# Patient Record
Sex: FEMALE | Race: WHITE | Employment: OTHER | URBAN - METROPOLITAN AREA
[De-identification: names, ages, dates, MRNs, and addresses within clinical notes are randomized per-mention and may not be internally consistent; named-entity substitution may affect disease eponyms.]

---

## 2017-01-30 ENCOUNTER — OFFICE VISIT (OUTPATIENT)
Dept: INTERNAL MEDICINE CLINIC | Age: 82
End: 2017-01-30

## 2017-01-30 VITALS
WEIGHT: 95 LBS | TEMPERATURE: 97 F | HEART RATE: 68 BPM | HEIGHT: 56 IN | RESPIRATION RATE: 16 BRPM | OXYGEN SATURATION: 99 % | BODY MASS INDEX: 21.37 KG/M2 | SYSTOLIC BLOOD PRESSURE: 148 MMHG | DIASTOLIC BLOOD PRESSURE: 73 MMHG

## 2017-01-30 DIAGNOSIS — F41.9 ANXIETY: Primary | ICD-10-CM

## 2017-01-30 DIAGNOSIS — K58.8 OTHER IRRITABLE BOWEL SYNDROME: ICD-10-CM

## 2017-01-30 DIAGNOSIS — Z23 ENCOUNTER FOR IMMUNIZATION: ICD-10-CM

## 2017-01-30 DIAGNOSIS — M15.9 PRIMARY OSTEOARTHRITIS INVOLVING MULTIPLE JOINTS: ICD-10-CM

## 2017-01-30 RX ORDER — CALC/MAG/B COMPLEX/D3/HERB 61
TABLET ORAL
Qty: 180 CAP | Refills: 5 | Status: SHIPPED | OUTPATIENT
Start: 2017-01-30 | End: 2017-05-30 | Stop reason: SDUPTHER

## 2017-01-30 RX ORDER — LORAZEPAM 0.5 MG/1
0.5 TABLET ORAL
Qty: 90 TAB | Refills: 2 | Status: SHIPPED | OUTPATIENT
Start: 2017-01-30 | End: 2017-05-09 | Stop reason: SDUPTHER

## 2017-01-30 NOTE — MR AVS SNAPSHOT
Visit Information Date & Time Provider Department Dept. Phone Encounter #  
 1/30/2017  2:00 PM Ra Prema, [facility] [phone] [id number] Follow-up Instructions Return in about 3 months (around 4/30/2017) for anxiety. Upcoming Health Maintenance Date Due ZOSTER VACCINE AGE 60> 7/7/1989 Pneumococcal 65+ Low/Medium Risk (2 of 2 - PPSV23) 10/28/2016 MEDICARE YEARLY EXAM 4/1/2017* GLAUCOMA SCREENING Q2Y 10/21/2017 DTaP/Tdap/Td series (2 - Td) 8/10/2026 *Topic was postponed. The date shown is not the original due date. Allergies as of 1/30/2017  Review Complete On: 1/30/2017 By: Jens Townsend LPN Severity Noted Reaction Type Reactions Flexeril [Cyclobenzaprine]  09/26/2011   Not Verified Unknown (comments)  
 unknown Pcn [Penicillins]  09/26/2011   Topical Rash Current Immunizations  Reviewed on 10/23/2012 Name Date Influenza High Dose Vaccine PF 10/27/2016, 9/30/2015 Influenza Vaccine Split 10/23/2012, 10/25/2011 Pneumococcal Conjugate (PCV-13) 10/28/2015 Tdap 8/10/2016 Not reviewed this visit You Were Diagnosed With   
  
 Codes Comments Anxiety    -  Primary ICD-10-CM: F41.9 ICD-9-CM: 300.00 Other irritable bowel syndrome     ICD-10-CM: K58.8 ICD-9-CM: 002.8 Primary osteoarthritis involving multiple joints     ICD-10-CM: M15.0 ICD-9-CM: 715.09 Encounter for immunization     ICD-10-CM: O29 ICD-9-CM: V03.89 Vitals BP Pulse Temp Resp Height(growth percentile) Weight(growth percentile) 148/73 (BP 1 Location: Left arm, BP Patient Position: Sitting) 68 97 °F (36.1 °C) (Oral) 16 4' 8\" (1.422 m) 95 lb (43.1 kg) SpO2 BMI OB Status Smoking Status 99% 21.3 kg/m2 Postmenopausal Never Smoker Vitals History BMI and BSA Data Body Mass Index Body Surface Area  
 21.3 kg/m 2 1.3 m 2 Preferred Pharmacy Pharmacy Name Phone [place] [address] [place] [facility] [phone] Your Updated Medication List  
  
   
This list is accurate as of: 17  2:30 PM.  Always use your most recent med list.  
  
  
  
  
 brimonidine 0.15 % ophthalmic solution Commonly known as:  Emmy Beam Administer 1 Drop to both eyes two (2) times a day. escitalopram oxalate 10 mg tablet Commonly known as:  Bon Chavez Take 1 Tab by mouth daily. fexofenadine 180 mg tablet Commonly known as:  Charis Frankel Take 1 Tab by mouth daily. fluticasone 50 mcg/actuation nasal spray Commonly known as:  Gerson Roberts 1spray each nostril qam  
  
 hydrocortisone 25 mg Supp Commonly known as:  ANUCORT-HC Insert 1 Suppository into rectum two (2) times a day. lansoprazole 15 mg capsule Commonly known as:  PREVACID  
1 bid LORazepam 0.5 mg tablet Commonly known as:  ATIVAN Take 1 Tab by mouth every eight (8) hours as needed. Max Daily Amount: 1.5 mg. As needed for anxiety TRAVATAN Z 0.004 % ophthalmic solution Generic drug:  travoprost  
Administer 1 Drop to both eyes nightly. Prescriptions Printed Refills LORazepam (ATIVAN) 0.5 mg tablet 2 Sig: Take 1 Tab by mouth every eight (8) hours as needed. Max Daily Amount: 1.5 mg. As needed for anxiety Class: Print Route: Oral  
  
Prescriptions Sent to Pharmacy Refills  
 lansoprazole (PREVACID) 15 mg capsule 5 Si bid Class: Normal  
 Pharmacy: [address] [facility] [place] [facility] Ph #: [phone] Follow-up Instructions Return in about 3 months (around 2017) for anxiety. Patient Instructions Learning About Anxiety Disorders What are anxiety disorders? Anxiety disorders are a type of medical problem. They cause severe anxiety. When you feel anxious, you feel that something bad is about to happen. This feeling interferes with your life. These disorders include: · Generalized anxiety disorder. You feel worried and stressed about many everyday events and activities. This goes on for several months and disrupts your life on most days. · Panic disorder. You have repeated panic attacks. A panic attack is a sudden, intense fear or anxiety. It may make you feel short of breath. Your heart may pound. · Social anxiety disorder. You feel very anxious about what you will say or do in front of people. For example, you may be scared to talk or eat in public. This problem affects your daily life. · Phobias. You are very scared of a specific object, situation, or activity. For example, you may fear spiders, high places, or small spaces. What are the symptoms? Generalized anxiety disorder Symptoms may include: · Feeling worried and stressed about many things almost every day. · Feeling tired or irritable. You may have a hard time concentrating. · Having headaches or muscle aches. · Having a hard time swallowing. · Feeling shaky, sweating, or having hot flashes. Panic disorder You may have repeated panic attacks when there is no reason for feeling afraid. You may change your daily activities because you worry that you will have another attack. Symptoms may include: 
· Intense fear, terror, or anxiety. · Trouble breathing or very fast breathing. · Chest pain or tightness. · A heartbeat that races or is not regular. Social anxiety disorder Symptoms may include: · Fear about a social situation, such as eating in front of others or speaking in public. You may worry a lot. Or you may be afraid that something bad will happen. · Anxiety that can cause you to blush, sweat, and feel shaky. · A heartbeat that is faster than normal. 
· A hard time focusing. Phobias Symptoms may include: · More fear than most people of being around an object, being in a situation, or doing an activity. You might also be stressed about the chance of being around the thing you fear. · Worry about losing control, panicking, fainting, or having physical symptoms like a faster heartbeat when you are around the situation or object. How are these disorders treated? Anxiety disorders can be treated with medicines or counseling. A combination of both may be used. Medicines may include: · Antidepressants. These may help your symptoms by keeping chemicals in your brain in balance. · Benzodiazepines. These may give you short-term relief of your symptoms. Some people use cognitive-behavioral therapy. A therapist helps you learn to change stressful or bad thoughts into helpful thoughts. Lead a healthy lifestyle A healthy lifestyle may help you feel better. · Get at least 30 minutes of exercise on most days of the week. Walking is a good choice. · Eat a healthy diet. Include fruits, vegetables, lean proteins, and whole grains in your diet each day. · Try to go to bed at the same time every night. Try for 8 hours of sleep a night. · Find ways to manage stress. Try relaxation exercises. · Avoid alcohol and illegal drugs. Follow-up care is a key part of your treatment and safety. Be sure to make and go to all appointments, and call your doctor if you are having problems. It's also a good idea to know your test results and keep a list of the medicines you take. Where can you learn more? Go to http://tootie-mery.info/. Enter N146 in the search box to learn more about \"Learning About Anxiety Disorders. \" Current as of: July 26, 2016 Content Version: 11.1 © 9895-6517 [employer]. Care instructions adapted under license by [employer] (which disclaims liability or warranty for this information). If you have questions about a medical condition or this instruction, always ask your healthcare professional. [place] [address] any warranty or liability for your use of this information. Lorazepam (By mouth) Lorazepam ([id number]) Treats anxiety. Brand Name(s):Ativan, LORazepam Intensol There may be other brand names for this medicine. When This Medicine Should Not Be Used: This medicine is not right for everyone. Do not use it if you had an allergic reaction to lorazepam or similar medicines, or if you have acute narrow-angle glaucoma. How to Use This Medicine:  
Tablet, Liquid · Take your medicine as directed. Your dose may need to be changed several times to find what works best for you. · Measure the oral liquid medicine with a marked measuring spoon, oral syringe, or medicine cup. · Mix the oral solution with water, juice, soda, applesauce, or pudding. Drink or eat the mixture right away. Do not store it for later use. · Missed dose: Take a dose as soon as you remember. If you are more than 1 hour late, skip the missed dose and wait until it is time for your next dose. Do not use extra medicine to make up for a missed dose. · Tablets: Store the medicine in a closed container at room temperature, away from heat, moisture, and direct light. · Oral solution: Refrigerate the oral solution. Throw away an opened bottle after 90 days. Drugs and Foods to Avoid: Ask your doctor or pharmacist before using any other medicine, including over-the-counter medicines, vitamins, and herbal products. · Some medicines can affect how lorazepam works. Tell your doctor if you are also using any of the following: ¨ Theophylline, aminophylline ¨ Clozapine ¨ Probenecid ¨ Valproate ¨ Medicine to treat seizures ¨ Medicine to treat depression or mental illness · Tell your doctor if you use anything else that makes you sleepy. Some examples are allergy medicine, narcotic pain medicine, and alcohol. Warnings While Using This Medicine: · Tell your doctor if you are pregnant or breastfeeding, or if you have glaucoma, liver disease, lung problems, or a history of depression, alcohol or drug addiction, or seizures. · This medicine can be habit-forming. Do not use more than your prescribed dose. Call your doctor if you think your medicine is not working. · Do not stop using this medicine suddenly. Your doctor will need to slowly decrease your dose before you stop it completely. · This medicine may make you drowsy. Do not drive or do anything else that could be dangerous until you know how this medicine affects you. · Your doctor will check your progress and the effects of this medicine at regular visits. Keep all appointments. · Keep all medicine out of the reach of children. Never share your medicine with anyone. Possible Side Effects While Using This Medicine:  
Call your doctor right away if you notice any of these side effects: 
· Depression, confusion, thoughts of hurting yourself · Severe drowsiness or weakness, slow heartbeat, trouble breathing If you notice these less serious side effects, talk with your doctor: · Dizziness, clumsiness · Unusual tiredness If you notice other side effects that you think are caused by this medicine, tell your doctor. Call your doctor for medical advice about side effects. You may report side effects to FDA at 5-533-FDA-5215 © 2016 [address] is for End User's use only and may not be sold, redistributed or otherwise used for commercial purposes. The above information is an  only. It is not intended as medical advice for individual conditions or treatments. Talk to your doctor, nurse or pharmacist before following any medical regimen to see if it is safe and effective for you. Introducing [facility]! Regi Barry introduces [employer] patient portal. Now you can access parts of your medical record, email your doctor's office, and request medication refills online. 1. In your internet browser, go to [email or web address] 2. Click on the First Time User? Click Here link in the Sign In box.  You will see the New Member Sign Up page. 3. Enter your [employer] Access Code exactly as it appears below. You will not need to use this code after youve completed the sign-up process. If you do not sign up before the expiration date, you must request a new code. · [employer] Access Code: [email or web address] Expires: 4/30/2017  2:30 PM 
 
4. Enter the last four digits of your Social Security Number (xxxx) and Date of Birth (mm/dd/yyyy) as indicated and click Submit. You will be taken to the next sign-up page. 5. Create a [employer] ID. This will be your [employer] login ID and cannot be changed, so think of one that is secure and easy to remember. 6. Create a [employer] password. You can change your password at any time. 7. Enter your Password Reset Question and Answer. This can be used at a later time if you forget your password. 8. Enter your e-mail address. You will receive e-mail notification when new information is available in [facility]. 9. Click Sign Up. You can now view and download portions of your medical record. 10. Click the Download Summary menu link to download a portable copy of your medical information. If you have questions, please visit the Frequently Asked Questions section of the [employer] website. Remember, [employer] is NOT to be used for urgent needs. For medical emergencies, dial 911. Now available from your iPhone and Android! Please provide this summary of care documentation to your next provider. Your primary care clinician is listed as [facility]. If you have any questions after today's visit, please call [phone].

## 2017-01-30 NOTE — PROGRESS NOTES
HISTORY OF PRESENT ILLNESS  Mer Schulz is a 80 y.o. female. HPI Comments: 81 yo female here for f/u of anxiety and IBS. She reports that she is taking the ativan TID and has been doing so for several years. Did reduce dose from 1mg to 0.5mg this summer. Did not note change in sx. She also continues to take the Lexapro. IBS sx currently stable. Had some bloating recently after eating more apples. Has since improved. Review of Systems   Constitutional: Negative for chills, fever and weight loss. HENT: Positive for congestion. Negative for nosebleeds. Eyes: Negative for blurred vision and pain. Respiratory: Negative for cough and shortness of breath. Cardiovascular: Negative for chest pain, palpitations and leg swelling. Gastrointestinal: Negative for heartburn, nausea and vomiting. Genitourinary: Negative for frequency and urgency. Musculoskeletal: Positive for joint pain (R knee). Negative for myalgias. Skin: Negative for itching and rash. Neurological: Negative for dizziness, tingling and headaches. Psychiatric/Behavioral: Negative for depression. The patient is nervous/anxious. Past Medical History   Diagnosis Date    Cancer [facility]) 8/2001     skin ca; chest     Degenerative arthritis of lumbar spine 9/26/2011    Depression 9/26/2011    Dyspnea     Glaucoma     Menopause     Osteoporosis     Scoliosis     Vitamin D deficiency      Current Outpatient Prescriptions on File Prior to Visit   Medication Sig Dispense Refill    hydrocortisone (ANUCORT-HC) 25 mg supp Insert 1 Suppository into rectum two (2) times a day. 180 Each 0    escitalopram oxalate (LEXAPRO) 10 mg tablet Take 1 Tab by mouth daily. 90 Tab 5    travoprost (TRAVATAN Z) 0.004 % ophthalmic solution Administer 1 Drop to both eyes nightly.  brimonidine (ALPHAGAN) 0.15 % ophthalmic solution Administer 1 Drop to both eyes two (2) times a day.       fluticasone (FLONASE) 50 mcg/actuation nasal spray 1spray each nostril qam 1 Bottle 5    fexofenadine (ALLEGRA) 180 mg tablet Take 1 Tab by mouth daily. 90 Tab 5     No current facility-administered medications on file prior to visit. Physical Exam   Constitutional: She appears well-developed and well-nourished. No distress. /73 (BP 1 Location: Left arm, BP Patient Position: Sitting)  Pulse 68  Temp 97 °F (36.1 °C) (Oral)   Resp 16  Ht 4' 8\" (1.422 m)  Wt 95 lb (43.1 kg)  SpO2 99%  BMI 21.3 kg/m2     HENT:   Nose: No mucosal edema or rhinorrhea. Right sinus exhibits no maxillary sinus tenderness. Left sinus exhibits no maxillary sinus tenderness. Mouth/Throat: No oropharyngeal exudate. Eyes: EOM are normal. Right eye exhibits no discharge. Left eye exhibits no discharge. No scleral icterus. Neck: Neck supple. Cardiovascular: Normal rate, regular rhythm and normal heart sounds. Exam reveals no gallop and no friction rub. No murmur heard. Pulmonary/Chest: Effort normal and breath sounds normal. No respiratory distress. She has no wheezes. She has no rales. Abdominal: Soft. She exhibits no distension. There is no tenderness. There is no rebound and no guarding. Musculoskeletal: She exhibits no edema or tenderness. Lymphadenopathy:     She has no cervical adenopathy. Neurological: She is alert. She exhibits normal muscle tone. Skin: Skin is warm and dry. Psychiatric: She has a normal mood and affect.      Lab Results   Component Value Date/Time    Sodium 142 01/09/2015 12:10 PM    Potassium 4.5 01/09/2015 12:10 PM    Chloride 98 01/09/2015 12:10 PM    CO2 26 01/09/2015 12:10 PM    Glucose 99 01/09/2015 12:10 PM    BUN 19 01/09/2015 12:10 PM    Creatinine 0.52 01/09/2015 12:10 PM    BUN/Creatinine ratio 37 01/09/2015 12:10 PM    GFR est  01/09/2015 12:10 PM    GFR est non-AA 87 01/09/2015 12:10 PM    Calcium 9.9 01/09/2015 12:10 PM    Bilirubin, total <0.2 01/09/2015 12:10 PM    ALT 14 01/09/2015 12:10 PM    AST 25 01/09/2015 12:10 PM    Alk. phosphatase 79 01/09/2015 12:10 PM    Protein, total 6.4 01/09/2015 12:10 PM    Albumin 4.7 01/09/2015 12:10 PM    A-G Ratio 2.8 01/09/2015 12:10 PM     ASSESSMENT and PLAN    ICD-10-CM ICD-9-CM    1. Anxiety F41.9 300.00    2. Other irritable bowel syndrome K58.8 564.1    3. Primary osteoarthritis involving multiple joints M15.0 715.09    4. Encounter for immunization Z23 V03.89 PNEUMOCOCCAL POLYSACCHARIDE VACCINE, 23-VALENT, ADULT OR IMMUNOSUPPRESSED PT DOSE,   Discussed risks of benzodiazepines. She feels she is tolerating medication and sx are controlled. Not using prn but scheduled TID. Would need to taper slowly given chronic use but she is reluctant to do so. Will continue Lexapro. Continue dietary changes regarding IBS. Pneumovax unavailable today. Will try biofreeze for joint pain. Saline NS for congestion.

## 2017-01-30 NOTE — PATIENT INSTRUCTIONS
Learning About Anxiety Disorders  What are anxiety disorders? Anxiety disorders are a type of medical problem. They cause severe anxiety. When you feel anxious, you feel that something bad is about to happen. This feeling interferes with your life. These disorders include:  · Generalized anxiety disorder. You feel worried and stressed about many everyday events and activities. This goes on for several months and disrupts your life on most days. · Panic disorder. You have repeated panic attacks. A panic attack is a sudden, intense fear or anxiety. It may make you feel short of breath. Your heart may pound. · Social anxiety disorder. You feel very anxious about what you will say or do in front of people. For example, you may be scared to talk or eat in public. This problem affects your daily life. · Phobias. You are very scared of a specific object, situation, or activity. For example, you may fear spiders, high places, or small spaces. What are the symptoms? Generalized anxiety disorder  Symptoms may include:  · Feeling worried and stressed about many things almost every day. · Feeling tired or irritable. You may have a hard time concentrating. · Having headaches or muscle aches. · Having a hard time swallowing. · Feeling shaky, sweating, or having hot flashes. Panic disorder  You may have repeated panic attacks when there is no reason for feeling afraid. You may change your daily activities because you worry that you will have another attack. Symptoms may include:  · Intense fear, terror, or anxiety. · Trouble breathing or very fast breathing. · Chest pain or tightness. · A heartbeat that races or is not regular. Social anxiety disorder  Symptoms may include:  · Fear about a social situation, such as eating in front of others or speaking in public. You may worry a lot. Or you may be afraid that something bad will happen. · Anxiety that can cause you to blush, sweat, and feel shaky.   · A heartbeat that is faster than normal.  · A hard time focusing. Phobias  Symptoms may include:  · More fear than most people of being around an object, being in a situation, or doing an activity. You might also be stressed about the chance of being around the thing you fear. · Worry about losing control, panicking, fainting, or having physical symptoms like a faster heartbeat when you are around the situation or object. How are these disorders treated? Anxiety disorders can be treated with medicines or counseling. A combination of both may be used. Medicines may include:  · Antidepressants. These may help your symptoms by keeping chemicals in your brain in balance. · Benzodiazepines. These may give you short-term relief of your symptoms. Some people use cognitive-behavioral therapy. A therapist helps you learn to change stressful or bad thoughts into helpful thoughts. Lead a healthy lifestyle  A healthy lifestyle may help you feel better. · Get at least 30 minutes of exercise on most days of the week. Walking is a good choice. · Eat a healthy diet. Include fruits, vegetables, lean proteins, and whole grains in your diet each day. · Try to go to bed at the same time every night. Try for 8 hours of sleep a night. · Find ways to manage stress. Try relaxation exercises. · Avoid alcohol and illegal drugs. Follow-up care is a key part of your treatment and safety. Be sure to make and go to all appointments, and call your doctor if you are having problems. It's also a good idea to know your test results and keep a list of the medicines you take. Where can you learn more? Go to http://tootie-mery.info/. Enter S253 in the search box to learn more about \"Learning About Anxiety Disorders. \"  Current as of: July 26, 2016  Content Version: 11.1  © 2062-7180 [employer].  Care instructions adapted under license by [employer] (which disclaims liability or warranty for this information). If you have questions about a medical condition or this instruction, always ask your healthcare professional. [address] any warranty or liability for your use of this information. Lorazepam (By mouth)   Lorazepam ([id number])  Treats anxiety. Brand Name(s):Ativan, LORazepam Intensol   There may be other brand names for this medicine. When This Medicine Should Not Be Used: This medicine is not right for everyone. Do not use it if you had an allergic reaction to lorazepam or similar medicines, or if you have acute narrow-angle glaucoma. How to Use This Medicine:   Tablet, Liquid  · Take your medicine as directed. Your dose may need to be changed several times to find what works best for you. · Measure the oral liquid medicine with a marked measuring spoon, oral syringe, or medicine cup. · Mix the oral solution with water, juice, soda, applesauce, or pudding. Drink or eat the mixture right away. Do not store it for later use. · Missed dose: Take a dose as soon as you remember. If you are more than 1 hour late, skip the missed dose and wait until it is time for your next dose. Do not use extra medicine to make up for a missed dose. · Tablets: Store the medicine in a closed container at room temperature, away from heat, moisture, and direct light. · Oral solution: Refrigerate the oral solution. Throw away an opened bottle after 90 days. Drugs and Foods to Avoid:   Ask your doctor or pharmacist before using any other medicine, including over-the-counter medicines, vitamins, and herbal products. · Some medicines can affect how lorazepam works. Tell your doctor if you are also using any of the following:   ¨ Theophylline, aminophylline  ¨ Clozapine  ¨ Probenecid  ¨ Valproate  ¨ Medicine to treat seizures  ¨ Medicine to treat depression or mental illness  · Tell your doctor if you use anything else that makes you sleepy.  Some examples are allergy medicine, narcotic pain medicine, and alcohol. Warnings While Using This Medicine:   · Tell your doctor if you are pregnant or breastfeeding, or if you have glaucoma, liver disease, lung problems, or a history of depression, alcohol or drug addiction, or seizures. · This medicine can be habit-forming. Do not use more than your prescribed dose. Call your doctor if you think your medicine is not working. · Do not stop using this medicine suddenly. Your doctor will need to slowly decrease your dose before you stop it completely. · This medicine may make you drowsy. Do not drive or do anything else that could be dangerous until you know how this medicine affects you. · Your doctor will check your progress and the effects of this medicine at regular visits. Keep all appointments. · Keep all medicine out of the reach of children. Never share your medicine with anyone. Possible Side Effects While Using This Medicine:   Call your doctor right away if you notice any of these side effects:  · Depression, confusion, thoughts of hurting yourself  · Severe drowsiness or weakness, slow heartbeat, trouble breathing  If you notice these less serious side effects, talk with your doctor:   · Dizziness, clumsiness  · Unusual tiredness  If you notice other side effects that you think are caused by this medicine, tell your doctor. Call your doctor for medical advice about side effects. You may report side effects to FDA at 7-282-FDA-3102  © 2016 [address] is for End User's use only and may not be sold, redistributed or otherwise used for commercial purposes. The above information is an  only. It is not intended as medical advice for individual conditions or treatments. Talk to your doctor, nurse or pharmacist before following any medical regimen to see if it is safe and effective for you.

## 2017-01-30 NOTE — PROGRESS NOTES
Patient presents today for F/U Anxiety, constipation   Pt preferred language for healthcare discussion is english. Do you have an advanced directive? No  Is someone accompanying this pt? If so who? No   Is the patient using any DME equipment during OV? No What equipment? 1. Have you been to the ER, urgent care clinic since your last visit? Hospitalized since your last visit?  No

## 2017-02-06 ENCOUNTER — TELEPHONE (OUTPATIENT)
Dept: INTERNAL MEDICINE CLINIC | Age: 82
End: 2017-02-06

## 2017-02-06 NOTE — TELEPHONE ENCOUNTER
Submitted prior auth via telephone medication was denied. Pt will receive a letter informing her of denial and office will receive a fax explaining the decision.

## 2017-02-06 NOTE — TELEPHONE ENCOUNTER
Please call [phone] for authorization on patients Prevacid please call them to submit new authorization

## 2017-05-09 ENCOUNTER — OFFICE VISIT (OUTPATIENT)
Dept: INTERNAL MEDICINE CLINIC | Age: 82
End: 2017-05-09

## 2017-05-09 VITALS
SYSTOLIC BLOOD PRESSURE: 151 MMHG | TEMPERATURE: 97.8 F | OXYGEN SATURATION: 96 % | HEIGHT: 56 IN | WEIGHT: 95.2 LBS | DIASTOLIC BLOOD PRESSURE: 59 MMHG | BODY MASS INDEX: 21.41 KG/M2 | HEART RATE: 83 BPM | RESPIRATION RATE: 16 BRPM

## 2017-05-09 DIAGNOSIS — F41.9 ANXIETY: ICD-10-CM

## 2017-05-09 DIAGNOSIS — K21.9 GASTROESOPHAGEAL REFLUX DISEASE WITHOUT ESOPHAGITIS: ICD-10-CM

## 2017-05-09 DIAGNOSIS — Z00.00 ROUTINE GENERAL MEDICAL EXAMINATION AT A HEALTH CARE FACILITY: Primary | ICD-10-CM

## 2017-05-09 DIAGNOSIS — K58.8 OTHER IRRITABLE BOWEL SYNDROME: ICD-10-CM

## 2017-05-09 RX ORDER — LORAZEPAM 0.5 MG/1
TABLET ORAL
Qty: 30 TAB | Refills: 2
Start: 2017-05-09 | End: 2017-11-10 | Stop reason: SDUPTHER

## 2017-05-09 NOTE — PROGRESS NOTES
Pt presents today for a Medicare wellness Visit, patient reports feeling gassy, patient also reports a burning feeling in throat and her chest.     Pt preferred language for health care discussion is english. Is someone accompanying this pt? no    Is the patient using any DME equipment during OV? no    Depression Screening completed. Yes    Learning Assessment completed. yes    Abuse Screening completed. yes    Health Maintenance reviewed and discussed per provider. Yes    Patient is due forpneumococcal 23, Zoster  Please order/place referral if appropriate. Advance Directive:  1. Do you have an advance directive in place? Patient Reply: No. Medical Advance directive paper given. Coordination of Care:  1. Have you been to the ER, urgent care clinic since your last visit? Hospitalized since your last visit? no    2. Have you seen or consulted any other health care providers outside of the [facility] since your last visit? Include any pap smears or colon screening.  no

## 2017-05-09 NOTE — PROGRESS NOTES
HISTORY OF PRESENT ILLNESS  Prasanth Drake is a 80 y.o. female. HPI Comments: 81 yo female here for [address], f/u of GERD, IBS. IBS: constipation predominant. Had small BM yesterday after sorbitol. Has tried Amitiza in the past in addition to other OTC medications which were not effective. Occasional bloating. Burning sensation esophagus occasional which has recently increased. Prevacid in am; had been ordered BID. No weight loss. Anxiety is stable. She has decreased ativan to once daily without difficulty. Review of Systems   Constitutional: Negative for chills, fever and weight loss. HENT: Negative for congestion. Eyes: Negative for blurred vision and pain. Respiratory: Negative for cough and shortness of breath. Cardiovascular: Negative for chest pain, palpitations and leg swelling. Gastrointestinal: Positive for constipation and heartburn. Negative for blood in stool, nausea and vomiting. Genitourinary: Negative for frequency. Musculoskeletal: Negative for joint pain and myalgias. Neurological: Negative for dizziness, tingling and headaches. Psychiatric/Behavioral: Negative for depression. The patient is not nervous/anxious. Past Medical History:   Diagnosis Date    Cancer ([facility] [address] 8/2001    skin ca; chest     Degenerative arthritis of lumbar spine 9/26/2011    Depression 9/26/2011    Dyspnea     Glaucoma     Menopause     Osteoporosis     Scoliosis     Vitamin D deficiency      Current Outpatient Prescriptions on File Prior to Visit   Medication Sig Dispense Refill    lansoprazole (PREVACID) 15 mg capsule 1 bid 180 Cap 5    hydrocortisone (ANUCORT-HC) 25 mg supp Insert 1 Suppository into rectum two (2) times a day. 180 Each 0    escitalopram oxalate (LEXAPRO) 10 mg tablet Take 1 Tab by mouth daily. 90 Tab 5    travoprost (TRAVATAN Z) 0.004 % ophthalmic solution Administer 1 Drop to both eyes nightly.       brimonidine (ALPHAGAN) 0.15 % ophthalmic solution Administer 1 Drop to both eyes two (2) times a day.  fluticasone (FLONASE) 50 mcg/actuation nasal spray 1spray each nostril qam 1 Bottle 5    fexofenadine (ALLEGRA) 180 mg tablet Take 1 Tab by mouth daily. 90 Tab 5     No current facility-administered medications on file prior to visit. Social History   Substance Use Topics    Smoking status: Never Smoker    Smokeless tobacco: Never Used    Alcohol use No       Physical Exam   Constitutional: She appears well-developed and well-nourished. No distress. /59 (BP 1 Location: Left arm, BP Patient Position: Sitting)  Pulse 83  Temp 97.8 °F (36.6 °C) (Oral)   Resp 16  Ht 4' 8\" (1.422 m)  Wt 95 lb 3.2 oz (43.2 kg)  SpO2 96%  BMI 21.34 kg/m2     HENT:   Mouth/Throat: Oropharynx is clear and moist. No oropharyngeal exudate (mild erythema). Eyes: Right eye exhibits no discharge. Left eye exhibits no discharge. No scleral icterus. Neck: Neck supple. Cardiovascular: Normal rate, regular rhythm and normal heart sounds. Exam reveals no gallop and no friction rub. No murmur heard. Pulmonary/Chest: Effort normal and breath sounds normal. No respiratory distress. She has no wheezes. She has no rales. Abdominal: Soft. Bowel sounds are normal. She exhibits no distension. There is no tenderness. There is no rebound and no guarding. Musculoskeletal: She exhibits no edema. Lymphadenopathy:     She has no cervical adenopathy. Neurological: She is alert. She exhibits normal muscle tone. Skin: Skin is warm and dry. Psychiatric: She has a normal mood and affect.      Lab Results   Component Value Date/Time    WBC 7.9 01/09/2015 12:10 PM    HGB 12.8 01/09/2015 12:10 PM    HCT 39.3 01/09/2015 12:10 PM    PLATELET 121 56/46/8322 12:10 PM    MCV 92 01/09/2015 12:10 PM     Lab Results   Component Value Date/Time    Sodium 142 01/09/2015 12:10 PM    Potassium 4.5 01/09/2015 12:10 PM    Chloride 98 01/09/2015 12:10 PM    CO2 26 01/09/2015 12:10 PM    Glucose 99 01/09/2015 12:10 PM    BUN 19 01/09/2015 12:10 PM    Creatinine 0.52 01/09/2015 12:10 PM    BUN/Creatinine ratio 37 01/09/2015 12:10 PM    GFR est  01/09/2015 12:10 PM    GFR est non-AA 87 01/09/2015 12:10 PM    Calcium 9.9 01/09/2015 12:10 PM    Bilirubin, total <0.2 01/09/2015 12:10 PM    AST (SGOT) 25 01/09/2015 12:10 PM    Alk. phosphatase 79 01/09/2015 12:10 PM    Protein, total 6.4 01/09/2015 12:10 PM    Albumin 4.7 01/09/2015 12:10 PM    A-G Ratio 2.8 01/09/2015 12:10 PM    ALT (SGPT) 14 01/09/2015 12:10 PM     ASSESSMENT and PLAN    ICD-10-CM ICD-9-CM    1. Routine general medical examination at a health care facility Z00.00 V70.0    2. Other irritable bowel syndrome K58.8 564.1    3. Anxiety F41.9 300.00    4. Gastroesophageal reflux disease without esophagitis K21.9 530.81    Will increased prevacid to BID and monitor sx. Can consider prn zantac. IBS chronic issue. Will continue with sorbitol for now. Discussed linzess but she is concerned with possible side effects. Anxiety stable. discussed reducing ativan to just prn.

## 2017-05-09 NOTE — PROGRESS NOTES
This is a Subsequent Medicare Annual Wellness Visit providing Personalized Prevention Plan Services (PPPS) (Performed 12 months after initial AWV and PPPS )    I have reviewed the patient's medical history in detail and updated the computerized patient record. History   79 yo female here for [address] Lives alone. Son lives near by and good support for her . She has never driven and relies on him for transportation, but otherwise independent in IADLs. Notes increase in esophageal burning intermittently. Continued chronic constipation due to IBS. Gets good results with sorbitol. Past Medical History:   Diagnosis Date    Cancer [facility]) 8/2001    skin ca; chest     Degenerative arthritis of lumbar spine 9/26/2011    Depression 9/26/2011    Dyspnea     Glaucoma     Menopause     Osteoporosis     Scoliosis     Vitamin D deficiency       Past Surgical History:   Procedure Laterality Date    BREAST SURGERY PROCEDURE UNLISTED      HX BREAST LUMPECTOMY      bilateral; non cancerous    HX GYN  8/2001    adhesion of the uterus      Current Outpatient Prescriptions   Medication Sig Dispense Refill    SORBITOL by Does Not Apply route.  lansoprazole (PREVACID) 15 mg capsule 1 bid 180 Cap 5    LORazepam (ATIVAN) 0.5 mg tablet Take 1 Tab by mouth every eight (8) hours as needed. Max Daily Amount: 1.5 mg. As needed for anxiety 90 Tab 2    hydrocortisone (ANUCORT-HC) 25 mg supp Insert 1 Suppository into rectum two (2) times a day. 180 Each 0    escitalopram oxalate (LEXAPRO) 10 mg tablet Take 1 Tab by mouth daily. 90 Tab 5    travoprost (TRAVATAN Z) 0.004 % ophthalmic solution Administer 1 Drop to both eyes nightly.  brimonidine (ALPHAGAN) 0.15 % ophthalmic solution Administer 1 Drop to both eyes two (2) times a day.  fluticasone (FLONASE) 50 mcg/actuation nasal spray 1spray each nostril qam 1 Bottle 5    fexofenadine (ALLEGRA) 180 mg tablet Take 1 Tab by mouth daily.  90 Tab 5     Allergies   Allergen Reactions    Flexeril [Cyclobenzaprine] Unknown (comments)     unknown    Pcn [Penicillins] Rash     Family History   Problem Relation Age of Onset    Cancer Mother     Heart Disease Father     Hypertension Sister     Diabetes Brother     Heart Disease Brother     Cancer Brother     Heart Disease Sister      Social History   Substance Use Topics    Smoking status: Never Smoker    Smokeless tobacco: Never Used    Alcohol use No     Patient Active Problem List   Diagnosis Code    Irritable bowel syndrome K58.9    Degenerative arthritis of lumbar spine M47.816    Glaucoma H40.9    Anxiety F41.9    Rectal polyp K62.1    Rectal pain K62.89    Persistent cough R05    Lung crackles R09.89    Chills R68.83    Abnormal chest x-ray R93.8       Depression Risk Factor Screening:     PHQ over the last two weeks 5/9/2017   Little interest or pleasure in doing things Not at all   Feeling down, depressed or hopeless Not at all   Total Score PHQ 2 0     Alcohol Risk Factor Screening:   nondrinker      Functional Ability and Level of Safety:     Hearing Loss   mild    Activities of Daily Living   Self-care. Requires assistance with: Driving (has never driven) and no ADLs    Fall Risk     Fall Risk Assessment, last 12 mths 5/9/2017   Able to walk? Yes   Fall in past 12 months? No   Fall with injury? -   Number of falls in past 12 months -   Fall Risk Score -     Abuse Screen   Patient is not abused    Review of Systems   A comprehensive review of systems was negative except for that written in the HPI.     Physical Examination     Evaluation of Cognitive Function:  Mood/affect:  happy  Appearance: age appropriate  Family member/caregiver input: none    Visit Vitals    /59 (BP 1 Location: Left arm, BP Patient Position: Sitting)    Pulse 83    Temp 97.8 °F (36.6 °C) (Oral)    Resp 16    Ht 4' 8\" (1.422 m)    Wt 95 lb 3.2 oz (43.2 kg)    SpO2 96%    BMI 21.34 kg/m2     General appearance: alert, cooperative, no distress, appears stated age  Lungs: clear to auscultation bilaterally  Heart: regular rate and rhythm, S1, S2 normal, no murmur, click, rub or gallop  Abdomen: soft, non-tender. Bowel sounds normal. No masses,  no organomegaly    Patient Care Team:  Carole Thomas MD as PCP - General (Internal Medicine)    Advice/Referrals/Counseling   Education and counseling provided:  End-of-Life planning (with patient's consent)  Pneumococcal Vaccine      Assessment/Plan       ICD-10-CM ICD-9-CM    1. Routine general medical examination at a health care facility Z00.00 V70.0    2. Other irritable bowel syndrome K58.8 564.1    3. Anxiety F41.9 300.00    4. Gastroesophageal reflux disease without esophagitis K21.9 530.81    Discussed ACP. She does not feel ready to complete paperwork for this, but will have discussions on the topic with her son who would serve as her surrogate decision maker. Pneumovax when this becomes available. Zostavax at pharmacy.

## 2017-05-09 NOTE — PATIENT INSTRUCTIONS
Gastroesophageal Reflux Disease (GERD): Care Instructions  Your Care Instructions    Gastroesophageal reflux disease (GERD) is the backward flow of stomach acid into the esophagus. The esophagus is the tube that leads from your throat to your stomach. A one-way valve prevents the stomach acid from moving up into this tube. When you have GERD, this valve does not close tightly enough. If you have mild GERD symptoms including heartburn, you may be able to control the problem with antacids or over-the-counter medicine. Changing your diet, losing weight, and making other lifestyle changes can also help reduce symptoms. Follow-up care is a key part of your treatment and safety. Be sure to make and go to all appointments, and call your doctor if you are having problems. Its also a good idea to know your test results and keep a list of the medicines you take. How can you care for yourself at home? · Take your medicines exactly as prescribed. Call your doctor if you think you are having a problem with your medicine. · Your doctor may recommend over-the-counter medicine. For mild or occasional indigestion, antacids, such as Tums, Gaviscon, Mylanta, or Maalox, may help. Your doctor also may recommend over-the-counter acid reducers, such as Pepcid AC, Tagamet HB, Zantac 75, or Prilosec. Read and follow all instructions on the label. If you use these medicines often, talk with your doctor. · Change your eating habits. ¨ Its best to eat several small meals instead of two or three large meals. ¨ After you eat, wait 2 to 3 hours before you lie down. ¨ Chocolate, mint, and alcohol can make GERD worse. ¨ Spicy foods, foods that have a lot of acid (like tomatoes and oranges), and coffee can make GERD symptoms worse in some people. If your symptoms are worse after you eat a certain food, you may want to stop eating that food to see if your symptoms get better.   · Do not smoke or chew tobacco. Smoking can make GERD worse. If you need help quitting, talk to your doctor about stop-smoking programs and medicines. These can increase your chances of quitting for good. · If you have GERD symptoms at night, raise the head of your bed 6 to 8 inches by putting the frame on blocks or placing a foam wedge under the head of your mattress. (Adding extra pillows does not work.)  · Do not wear tight clothing around your middle. · Lose weight if you need to. Losing just 5 to 10 pounds can help. When should you call for help? Call your doctor now or seek immediate medical care if:  · You have new or different belly pain. · Your stools are black and tarlike or have streaks of blood. Watch closely for changes in your health, and be sure to contact your doctor if:  · Your symptoms have not improved after 2 days. · Food seems to catch in your throat or chest.  Where can you learn more? Go to http://tootieEnsemble Discoverymery.info/. Enter K961 in the search box to learn more about \"Gastroesophageal Reflux Disease (GERD): Care Instructions. \"  Current as of: August 9, 2016  Content Version: 11.2  © 4216-9441 [employer]. Care instructions adapted under license by [employer] (which disclaims liability or warranty for this information). If you have questions about a medical condition or this instruction, always ask your healthcare professional. [address] any warranty or liability for your use of this information. Diet for Irritable Bowel Syndrome: Care Instructions  Your Care Instructions  Irritable bowel syndrome, or IBS, is a problem with the intestines. IBS can cause belly pain, bloating, gas, constipation, and diarrhea. Most people can control their symptoms by changing their diet and easing stress. No specific foods cause everyone with IBS to have symptoms. Doctors don't offer a specific diet to manage symptoms.  But many people find that they feel better when they stop eating certain foods. A high-fiber diet may help if you have constipation. Follow-up care is a key part of your treatment and safety. Be sure to make and go to all appointments, and call your doctor if you are having problems. It's also a good idea to know your test results and keep a list of the medicines you take. How can you care for yourself at home? To reduce constipation  · Include fruits, vegetables, beans, and whole grains in your diet each day. These foods are high in fiber. Slowly increase the amount of fiber you eat. This helps you avoid a lot of gas. · Drink plenty of fluids, enough so that your urine is light yellow or clear like water. If you have kidney, heart, or liver disease and have to limit fluids, talk with your doctor before you increase the amount of fluids you drink. · Get some exercise every day. Build up slowly to 30 to 60 minutes a day on 5 or more days of the week. · Take a fiber supplement, such as Citrucel or Metamucil, every day if needed. Read and follow all instructions on the label. · Schedule time each day for a bowel movement. Having a daily routine may help. Take your time and do not strain when having a bowel movement. · Check with your doctor before you increase the amount of fiber in your diet. For some people who have IBS, eating more fiber may make some symptoms worse. This includes bloating. To reduce diarrhea  You may try giving up foods or drinks one at a time to see whether symptoms improve.  Limit or avoid the following:  · Alcohol  · Caffeine, which is found in coffee, tea, cola drinks, and chocolate  · Nicotine, from smoking or chewing tobacco  · Gas-producing foods, such as beans, broccoli, cabbage, and apples  · Dairy products that contain lactose (milk sugar), such as ice cream, milk, cheese, and sour cream  · Foods and drinks high in sugar, especially fruit juice, soda, candy, and other packaged sweets (such as cookies)  · Foods high in fat, including farias, sausage, butter, oils, and anything deep-fried  · Sorbitol and xylitol, artificial sweeteners found in some sugarless candies and chewing gum  Keep track of foods  · Some people with IBS use a daily food diary to keep track of what they eat and whether they have any symptoms after eating certain foods. The diary also can be a good way to record what is going on in your life. · Stress plays a role in IBS. So if you are aware that certain stresses bring on symptoms, you can try to reduce those stresses. Keep mealtimes pleasant  · Try to maintain a pleasant environment when you eat. This may reduce stress that can make symptoms likely to occur. · Give yourself plenty of time to eat, rather than eating on the go. Chew your food slowly. Try not to swallow air, which can cause bloating. Where can you learn more? Go to http://tootie-mery.info/. Enter E766 in the search box to learn more about \"Diet for Irritable Bowel Syndrome: Care Instructions. \"  Current as of: July 26, 2016  Content Version: 11.2  © 7004-1886 [employer]. Care instructions adapted under license by [employer] (which disclaims liability or warranty for this information). If you have questions about a medical condition or this instruction, always ask your healthcare professional. [address] any warranty or liability for your use of this information.

## 2017-05-09 NOTE — MR AVS SNAPSHOT
Visit Information Date & Time Provider Department Dept. Phone Encounter #  
 5/9/2017 11:15 AM Mariella Siddiqui [address] [phone] [id number] Follow-up Instructions Return in about 6 months (around 11/9/2017), or if symptoms worsen or fail to improve. Upcoming Health Maintenance Date Due Pneumococcal 65+ Low/Medium Risk (2 of 2 - PPSV23) 10/28/2016 ZOSTER VACCINE AGE 60> 5/10/2017* INFLUENZA AGE 9 TO ADULT 8/1/2017 GLAUCOMA SCREENING Q2Y 10/21/2017 MEDICARE YEARLY EXAM 5/10/2018 DTaP/Tdap/Td series (2 - Td) 8/10/2026 *Topic was postponed. The date shown is not the original due date. Allergies as of 5/9/2017  Review Complete On: 5/9/2017 By: Mariella Siddiqui MD  
  
 Severity Noted Reaction Type Reactions Flexeril [Cyclobenzaprine]  09/26/2011   Not Verified Unknown (comments)  
 unknown Pcn [Penicillins]  09/26/2011   Topical Rash Current Immunizations  Reviewed on 10/23/2012 Name Date Influenza High Dose Vaccine PF 10/27/2016, 9/30/2015 Influenza Vaccine Split 10/23/2012, 10/25/2011 Pneumococcal Conjugate (PCV-13) 10/28/2015 Tdap 8/10/2016 Not reviewed this visit You Were Diagnosed With   
  
 Codes Comments Other irritable bowel syndrome    -  Primary ICD-10-CM: K58.8 ICD-9-CM: 300.6 Routine general medical examination at a health care facility     ICD-10-CM: Z00.00 ICD-9-CM: V70.0 Anxiety     ICD-10-CM: F41.9 ICD-9-CM: 300.00 Gastroesophageal reflux disease without esophagitis     ICD-10-CM: K21.9 ICD-9-CM: 530.81 Vitals BP Pulse Temp Resp Height(growth percentile) Weight(growth percentile) 151/59 (BP 1 Location: Left arm, BP Patient Position: Sitting) 83 97.8 °F (36.6 °C) (Oral) 16 4' 8\" (1.422 m) 95 lb 3.2 oz (43.2 kg) SpO2 BMI OB Status Smoking Status 96% 21.34 kg/m2 Postmenopausal Never Smoker Vitals History BMI and BSA Data Body Mass Index Body Surface Area  
 21.34 kg/m 2 1.31 m 2 Preferred Pharmacy Pharmacy Name Phone  [address] [phone] Your Updated Medication List  
  
   
This list is accurate as of: 17 11:59 AM.  Always use your most recent med list.  
  
  
  
  
 brimonidine 0.15 % ophthalmic solution Commonly known as:  Ramiro Correa Administer 1 Drop to both eyes two (2) times a day. escitalopram oxalate 10 mg tablet Commonly known as:  Orlando Khan Take 1 Tab by mouth daily. fexofenadine 180 mg tablet Commonly known as:  Elisabeth Wills Take 1 Tab by mouth daily. fluticasone 50 mcg/actuation nasal spray Commonly known as:  José Miguel Camilo 1spray each nostril qam  
  
 hydrocortisone 25 mg Supp Commonly known as:  ANUCORT-HC Insert 1 Suppository into rectum two (2) times a day. lansoprazole 15 mg capsule Commonly known as:  PREVACID  
1 bid LORazepam 0.5 mg tablet Commonly known as:  ATIVAN I tab po q day if needed for anxiety SORBITOL  
by Does Not Apply route. TRAVATAN Z 0.004 % ophthalmic solution Generic drug:  travoprost  
Administer 1 Drop to both eyes nightly. varicella zoster vacine live 19,400 unit/0.65 mL Susr injection Commonly known as:  varicella-zoster vacine live 1 Vial by SubCUTAneous route once for 1 dose. Prescriptions Sent to Pharmacy Refills  
 varicella zoster vacine live (VARICELLA-ZOSTER VACINE LIVE) 19,400 unit/0.65 mL susr injection 0 Si Vial by SubCUTAneous route once for 1 dose. Class: Normal  
 Pharmacy: [facility] [address] Ph #: [phone] Route: SubCUTAneous Follow-up Instructions Return in about 6 months (around 2017), or if symptoms worsen or fail to improve. Patient Instructions Gastroesophageal Reflux Disease (GERD): Care Instructions Your Care Instructions Gastroesophageal reflux disease (GERD) is the backward flow of stomach acid into the esophagus. The esophagus is the tube that leads from your throat to your stomach. A one-way valve prevents the stomach acid from moving up into this tube. When you have GERD, this valve does not close tightly enough. If you have mild GERD symptoms including heartburn, you may be able to control the problem with antacids or over-the-counter medicine. Changing your diet, losing weight, and making other lifestyle changes can also help reduce symptoms. Follow-up care is a key part of your treatment and safety. Be sure to make and go to all appointments, and call your doctor if you are having problems. Its also a good idea to know your test results and keep a list of the medicines you take. How can you care for yourself at home? · Take your medicines exactly as prescribed. Call your doctor if you think you are having a problem with your medicine. · Your doctor may recommend over-the-counter medicine. For mild or occasional indigestion, antacids, such as Tums, Gaviscon, Mylanta, or Maalox, may help. Your doctor also may recommend over-the-counter acid reducers, such as Pepcid AC, Tagamet HB, Zantac 75, or Prilosec. Read and follow all instructions on the label. If you use these medicines often, talk with your doctor. · Change your eating habits. ¨ Its best to eat several small meals instead of two or three large meals. ¨ After you eat, wait 2 to 3 hours before you lie down. ¨ Chocolate, mint, and alcohol can make GERD worse. ¨ Spicy foods, foods that have a lot of acid (like tomatoes and oranges), and coffee can make GERD symptoms worse in some people. If your symptoms are worse after you eat a certain food, you may want to stop eating that food to see if your symptoms get better. · Do not smoke or chew tobacco. Smoking can make GERD worse.  If you need help quitting, talk to your doctor about stop-smoking programs and medicines. These can increase your chances of quitting for good. · If you have GERD symptoms at night, raise the head of your bed 6 to 8 inches by putting the frame on blocks or placing a foam wedge under the head of your mattress. (Adding extra pillows does not work.) · Do not wear tight clothing around your middle. · Lose weight if you need to. Losing just 5 to 10 pounds can help. When should you call for help? Call your doctor now or seek immediate medical care if: 
· You have new or different belly pain. · Your stools are black and tarlike or have streaks of blood. Watch closely for changes in your health, and be sure to contact your doctor if: 
· Your symptoms have not improved after 2 days. · Food seems to catch in your throat or chest. 
Where can you learn more? Go to http://tootie-mery.info/. Enter B169 in the search box to learn more about \"Gastroesophageal Reflux Disease (GERD): Care Instructions. \" Current as of: August 9, 2016 Content Version: 11.2 © 7573-4833 [employer]. Care instructions adapted under license by [employer] (which disclaims liability or warranty for this information). If you have questions about a medical condition or this instruction, always ask your healthcare professional. [address] any warranty or liability for your use of this information. Diet for Irritable Bowel Syndrome: Care Instructions Your Care Instructions Irritable bowel syndrome, or IBS, is a problem with the intestines. IBS can cause belly pain, bloating, gas, constipation, and diarrhea. Most people can control their symptoms by changing their diet and easing stress. No specific foods cause everyone with IBS to have symptoms. Doctors don't offer a specific diet to manage symptoms. But many people find that they feel better when they stop eating certain foods. A high-fiber diet may help if you have constipation. Follow-up care is a key part of your treatment and safety. Be sure to make and go to all appointments, and call your doctor if you are having problems. It's also a good idea to know your test results and keep a list of the medicines you take. How can you care for yourself at home? To reduce constipation · Include fruits, vegetables, beans, and whole grains in your diet each day. These foods are high in fiber. Slowly increase the amount of fiber you eat. This helps you avoid a lot of gas. · Drink plenty of fluids, enough so that your urine is light yellow or clear like water. If you have kidney, heart, or liver disease and have to limit fluids, talk with your doctor before you increase the amount of fluids you drink. · Get some exercise every day. Build up slowly to 30 to 60 minutes a day on 5 or more days of the week. · Take a fiber supplement, such as Citrucel or Metamucil, every day if needed. Read and follow all instructions on the label. · Schedule time each day for a bowel movement. Having a daily routine may help. Take your time and do not strain when having a bowel movement. · Check with your doctor before you increase the amount of fiber in your diet. For some people who have IBS, eating more fiber may make some symptoms worse. This includes bloating. To reduce diarrhea You may try giving up foods or drinks one at a time to see whether symptoms improve. Limit or avoid the following: · Alcohol · Caffeine, which is found in coffee, tea, cola drinks, and chocolate · Nicotine, from smoking or chewing tobacco 
· Gas-producing foods, such as beans, broccoli, cabbage, and apples · Dairy products that contain lactose (milk sugar), such as ice cream, milk, cheese, and sour cream 
· Foods and drinks high in sugar, especially fruit juice, soda, candy, and other packaged sweets (such as cookies) · Foods high in fat, including farias, sausage, butter, oils, and anything deep-fried · Sorbitol and xylitol, artificial sweeteners found in some sugarless candies and chewing gum Keep track of foods · Some people with IBS use a daily food diary to keep track of what they eat and whether they have any symptoms after eating certain foods. The diary also can be a good way to record what is going on in your life. · Stress plays a role in IBS. So if you are aware that certain stresses bring on symptoms, you can try to reduce those stresses. Keep mealtimes pleasant · Try to maintain a pleasant environment when you eat. This may reduce stress that can make symptoms likely to occur. · Give yourself plenty of time to eat, rather than eating on the go. Chew your food slowly. Try not to swallow air, which can cause bloating. Where can you learn more? Go to http://tootie-mery.info/. Enter X230 in the search box to learn more about \"Diet for Irritable Bowel Syndrome: Care Instructions. \" Current as of: July 26, 2016 Content Version: 11.2 © 3532-9649 [employer]. Care instructions adapted under license by [employer] (which disclaims liability or warranty for this information). If you have questions about a medical condition or this instruction, always ask your healthcare professional. [place] [address] any warranty or liability for your use of this information. Introducing [facility]! [facility] introduces [employer] patient portal. Now you can access parts of your medical record, email your doctor's office, and request medication refills online. 1. In your internet browser, go to [email or web address] 2. Click on the First Time User? Click Here link in the Sign In box. You will see the New Member Sign Up page. 3. Enter your [employer] Access Code exactly as it appears below. You will not need to use this code after youve completed the sign-up process.  If you do not sign up before the expiration date, you must request a new code. · [employer] Access Code: [email or web address] Expires: 8/7/2017 11:59 AM 
 
4. Enter the last four digits of your Social Security Number (xxxx) and Date of Birth (mm/dd/yyyy) as indicated and click Submit. You will be taken to the next sign-up page. 5. Create a [employer] ID. This will be your [employer] login ID and cannot be changed, so think of one that is secure and easy to remember. 6. Create a [employer] password. You can change your password at any time. 7. Enter your Password Reset Question and Answer. This can be used at a later time if you forget your password. 8. Enter your e-mail address. You will receive e-mail notification when new information is available in [address] 9. Click Sign Up. You can now view and download portions of your medical record. 10. Click the Download Summary menu link to download a portable copy of your medical information. If you have questions, please visit the Frequently Asked Questions section of the [employer] website. Remember, [employer] is NOT to be used for urgent needs. For medical emergencies, dial 911. Now available from your iPhone and Android! Please provide this summary of care documentation to your next provider. Your primary care clinician is listed as Cydney Jarrett Avalanis. If you have any questions after today's visit, please call [phone].

## 2017-05-25 RX ORDER — HYDROCORTISONE ACETATE 25 MG/1
25 SUPPOSITORY RECTAL 2 TIMES DAILY
Qty: 180 EACH | Refills: 0 | Status: SHIPPED | OUTPATIENT
Start: 2017-05-25 | End: 2017-08-10 | Stop reason: SDUPTHER

## 2017-05-30 RX ORDER — CALC/MAG/B COMPLEX/D3/HERB 61
TABLET ORAL
Qty: 180 CAP | Refills: 5 | Status: SHIPPED | OUTPATIENT
Start: 2017-05-30

## 2017-06-01 ENCOUNTER — TELEPHONE (OUTPATIENT)
Dept: INTERNAL MEDICINE CLINIC | Age: 82
End: 2017-06-01

## 2017-06-01 NOTE — TELEPHONE ENCOUNTER
Pharmacy requesting Prior Auth for [facility]    Documentation from [facility] scanned to media

## 2017-06-07 NOTE — TELEPHONE ENCOUNTER
Contacted prior [facility] department spoke with [facility], [address]. Two patient Identifiers confirmed. Advised they did receive paperwork but it had not been worked on as of yet. She advised she would pull paperwork and do auth while we were on the phone. Approval date 4/8/2017 through 4/8/2018. No other issue noted.

## 2017-08-10 ENCOUNTER — HOSPITAL ENCOUNTER (OUTPATIENT)
Dept: LAB | Age: 82
Discharge: HOME | End: 2017-08-10
Payer: MEDICARE

## 2017-08-10 ENCOUNTER — OFFICE VISIT (OUTPATIENT)
Dept: INTERNAL MEDICINE CLINIC | Age: 82
End: 2017-08-10

## 2017-08-10 VITALS
WEIGHT: 98.4 LBS | BODY MASS INDEX: 22.13 KG/M2 | TEMPERATURE: 97.8 F | OXYGEN SATURATION: 100 % | HEIGHT: 56 IN | DIASTOLIC BLOOD PRESSURE: 68 MMHG | RESPIRATION RATE: 18 BRPM | SYSTOLIC BLOOD PRESSURE: 133 MMHG | HEART RATE: 77 BPM

## 2017-08-10 DIAGNOSIS — K58.8 OTHER IRRITABLE BOWEL SYNDROME: ICD-10-CM

## 2017-08-10 DIAGNOSIS — F41.9 ANXIETY: ICD-10-CM

## 2017-08-10 DIAGNOSIS — R07.89 CHEST DISCOMFORT: ICD-10-CM

## 2017-08-10 DIAGNOSIS — R07.89 CHEST DISCOMFORT: Primary | ICD-10-CM

## 2017-08-10 DIAGNOSIS — J34.89 NASAL DISCHARGE: ICD-10-CM

## 2017-08-10 LAB
ALBUMIN SERPL BCP-MCNC: 3.6 G/DL (ref 3.4–5)
ALBUMIN/GLOB SERPL: 1.1 {RATIO} (ref 0.8–1.7)
ALP SERPL-CCNC: 94 U/L (ref 45–117)
ALT SERPL-CCNC: 23 U/L (ref 13–56)
ANION GAP BLD CALC-SCNC: 8 MMOL/L (ref 3–18)
AST SERPL W P-5'-P-CCNC: 19 U/L (ref 15–37)
BASOPHILS # BLD AUTO: 0.1 K/UL (ref 0–0.06)
BASOPHILS # BLD: 1 % (ref 0–2)
BILIRUB SERPL-MCNC: 0.2 MG/DL (ref 0.2–1)
BUN SERPL-MCNC: 19 MG/DL (ref 7–18)
BUN/CREAT SERPL: 30 (ref 12–20)
CALCIUM SERPL-MCNC: 9.6 MG/DL (ref 8.5–10.1)
CHLORIDE SERPL-SCNC: 103 MMOL/L (ref 100–108)
CHOLEST SERPL-MCNC: 188 MG/DL
CO2 SERPL-SCNC: 29 MMOL/L (ref 21–32)
CREAT SERPL-MCNC: 0.64 MG/DL (ref 0.6–1.3)
DIFFERENTIAL METHOD BLD: ABNORMAL
EOSINOPHIL # BLD: 0.8 K/UL (ref 0–0.4)
EOSINOPHIL NFR BLD: 9 % (ref 0–5)
ERYTHROCYTE [DISTWIDTH] IN BLOOD BY AUTOMATED COUNT: 13.4 % (ref 11.6–14.5)
GLOBULIN SER CALC-MCNC: 3.4 G/DL (ref 2–4)
GLUCOSE SERPL-MCNC: 93 MG/DL (ref 74–99)
HCT VFR BLD AUTO: 37.2 % (ref 35–45)
HDLC SERPL-MCNC: 73 MG/DL (ref 40–60)
HDLC SERPL: 2.6 {RATIO} (ref 0–5)
HGB BLD-MCNC: 11.8 G/DL (ref 12–16)
LDLC SERPL CALC-MCNC: 99.2 MG/DL (ref 0–100)
LIPID PROFILE,FLP: ABNORMAL
LYMPHOCYTES # BLD AUTO: 26 % (ref 21–52)
LYMPHOCYTES # BLD: 2.2 K/UL (ref 0.9–3.6)
MCH RBC QN AUTO: 29.4 PG (ref 24–34)
MCHC RBC AUTO-ENTMCNC: 31.7 G/DL (ref 31–37)
MCV RBC AUTO: 92.5 FL (ref 74–97)
MONOCYTES # BLD: 0.8 K/UL (ref 0.05–1.2)
MONOCYTES NFR BLD AUTO: 10 % (ref 3–10)
NEUTS SEG # BLD: 4.4 K/UL (ref 1.8–8)
NEUTS SEG NFR BLD AUTO: 54 % (ref 40–73)
PLATELET # BLD AUTO: 252 K/UL (ref 135–420)
PMV BLD AUTO: 10.1 FL (ref 9.2–11.8)
POTASSIUM SERPL-SCNC: 3.9 MMOL/L (ref 3.5–5.5)
PROT SERPL-MCNC: 7 G/DL (ref 6.4–8.2)
RBC # BLD AUTO: 4.02 M/UL (ref 4.2–5.3)
SODIUM SERPL-SCNC: 140 MMOL/L (ref 136–145)
TRIGL SERPL-MCNC: 79 MG/DL (ref ?–150)
VLDLC SERPL CALC-MCNC: 15.8 MG/DL
WBC # BLD AUTO: 8.2 K/UL (ref 4.6–13.2)

## 2017-08-10 PROCEDURE — 80061 LIPID PANEL: CPT | Performed by: INTERNAL MEDICINE

## 2017-08-10 PROCEDURE — 85025 COMPLETE CBC W/AUTO DIFF WBC: CPT | Performed by: INTERNAL MEDICINE

## 2017-08-10 PROCEDURE — 80053 COMPREHEN METABOLIC PANEL: CPT | Performed by: INTERNAL MEDICINE

## 2017-08-10 RX ORDER — ESCITALOPRAM OXALATE 10 MG/1
10 TABLET ORAL DAILY
Qty: 90 TAB | Refills: 5 | Status: SHIPPED | OUTPATIENT
Start: 2017-08-10 | End: 2018-08-21 | Stop reason: SDUPTHER

## 2017-08-10 RX ORDER — HYDROCORTISONE ACETATE 25 MG/1
25 SUPPOSITORY RECTAL 2 TIMES DAILY
Qty: 180 EACH | Refills: 0 | Status: SHIPPED | OUTPATIENT
Start: 2017-08-10 | End: 2017-08-14 | Stop reason: SDUPTHER

## 2017-08-10 RX ORDER — FLUTICASONE PROPIONATE 50 MCG
2 SPRAY, SUSPENSION (ML) NASAL DAILY
Qty: 1 BOTTLE | Refills: 3 | Status: SHIPPED | OUTPATIENT
Start: 2017-08-10

## 2017-08-10 RX ORDER — SIMETHICONE 80 MG
80 TABLET,CHEWABLE ORAL
COMMUNITY

## 2017-08-10 NOTE — PATIENT INSTRUCTIONS
Diet for Irritable Bowel Syndrome: Care Instructions  Your Care Instructions  Irritable bowel syndrome, or IBS, is a problem with the intestines. IBS can cause belly pain, bloating, gas, constipation, and diarrhea. Most people can control their symptoms by changing their diet and easing stress. No specific foods cause everyone with IBS to have symptoms. Doctors don't offer a specific diet to manage symptoms. But many people find that they feel better when they stop eating certain foods. A high-fiber diet may help if you have constipation. Follow-up care is a key part of your treatment and safety. Be sure to make and go to all appointments, and call your doctor if you are having problems. It's also a good idea to know your test results and keep a list of the medicines you take. How can you care for yourself at home? To reduce constipation  · Include fruits, vegetables, beans, and whole grains in your diet each day. These foods are high in fiber. Slowly increase the amount of fiber you eat. This helps you avoid a lot of gas. · Drink plenty of fluids, enough so that your urine is light yellow or clear like water. If you have kidney, heart, or liver disease and have to limit fluids, talk with your doctor before you increase the amount of fluids you drink. · Get some exercise every day. Build up slowly to 30 to 60 minutes a day on 5 or more days of the week. · Take a fiber supplement, such as Citrucel or Metamucil, every day if needed. Read and follow all instructions on the label. · Schedule time each day for a bowel movement. Having a daily routine may help. Take your time and do not strain when having a bowel movement. · Check with your doctor before you increase the amount of fiber in your diet. For some people who have IBS, eating more fiber may make some symptoms worse. This includes bloating. To reduce diarrhea  You may try giving up foods or drinks one at a time to see whether symptoms improve. Limit or avoid the following:  · Alcohol  · Caffeine, which is found in coffee, tea, cola drinks, and chocolate  · Nicotine, from smoking or chewing tobacco  · Gas-producing foods, such as beans, broccoli, cabbage, and apples  · Dairy products that contain lactose (milk sugar), such as ice cream, milk, cheese, and sour cream  · Foods and drinks high in sugar, especially fruit juice, soda, candy, and other packaged sweets (such as cookies)  · Foods high in fat, including farias, sausage, butter, oils, and anything deep-fried  · Sorbitol and xylitol, artificial sweeteners found in some sugarless candies and chewing gum  Keep track of foods  · Some people with IBS use a daily food diary to keep track of what they eat and whether they have any symptoms after eating certain foods. The diary also can be a good way to record what is going on in your life. · Stress plays a role in IBS. So if you are aware that certain stresses bring on symptoms, you can try to reduce those stresses. Keep mealtimes pleasant  · Try to maintain a pleasant environment when you eat. This may reduce stress that can make symptoms likely to occur. · Give yourself plenty of time to eat, rather than eating on the go. Chew your food slowly. Try not to swallow air, which can cause bloating. Where can you learn more? Go to http://tootie-mery.info/. Enter L680 in the search box to learn more about \"Diet for Irritable Bowel Syndrome: Care Instructions. \"  Current as of: July 26, 2016  Content Version: 11.3  © 4245-1535 [employer]. Care instructions adapted under license by [employer] (which disclaims liability or warranty for this information). If you have questions about a medical condition or this instruction, always ask your healthcare professional. [address] any warranty or liability for your use of this information.

## 2017-08-10 NOTE — PROGRESS NOTES
Katerin Roberts presents today for   Chief Complaint   Patient presents with    Anxiety     3 month f/u    Bloated     f/u     Requested Prescriptions     Pending Prescriptions Disp Refills    escitalopram oxalate (LEXAPRO) 10 mg tablet 90 Tab 5     Sig: Take 1 Tab by mouth daily.  hydrocortisone (ANUCORT-HC) 25 mg supp 180 Each 0     Sig: Insert 1 Suppository into rectum two (2) times a day. Katerin Roberts preferred language for health care discussion is english/other. Is someone accompanying this pt? no    Is the patient using any DME equipment during OV? no    Depression Screening:  PHQ over the last two weeks 8/10/2017 5/9/2017 1/30/2017 10/27/2016 6/9/2016 4/28/2016 4/9/2015   Little interest or pleasure in doing things Not at all Not at all Not at all Not at all Not at all Not at all Not at all   Feeling down, depressed or hopeless Not at all Not at all Not at all Not at all Not at all Not at all Not at all   Total Score PHQ 2 0 0 0 0 0 0 0       Learning Assessment:  Learning Assessment 4/9/2015 12/18/2013 9/18/2013   PRIMARY LEARNER Patient Patient Patient   HIGHEST LEVEL OF EDUCATION - PRIMARY LEARNER  GRADUATED HIGH SCHOOL OR GED GRADUATED HIGH SCHOOL OR GED -   BARRIERS PRIMARY LEARNER NONE NONE -   [facility]    NEED - No -   LEARNER [address] - no -   ANSWERED BY patient patient patient   RELATIONSHIP SELF SELF SELF       Abuse Screening:  Abuse Screening Questionnaire 5/9/2017 1/28/2016 4/9/2015   Do you ever feel afraid of your partner? N N N   Are you in a relationship with someone who physically or mentally threatens you? N N N   Is it safe for you to go home? Yvette Starr       Fall Risk  Fall Risk Assessment, last 12 mths 8/10/2017 5/9/2017 1/30/2017 10/27/2016 6/9/2016 4/28/2016 4/9/2015   Able to walk? Yes Yes Yes Yes Yes Yes Yes   Fall in past 12 months?  No No Yes Yes No No No   Fall with injury? - - Yes No - - -   Number of falls in past 12 months - - 1 1 - - -   Fall Risk Score - - 2 1 - - -       Health Maintenance reviewed and discussed per provider. Yes      Advance Directive:  1. Do you have an advance directive in place? Patient Reply: no    2. If not, would you like material regarding how to put one in place? Patient Reply: no    Coordination of Care:  1. Have you been to the ER, urgent care clinic since your last visit? Hospitalized since your last visit? no    2. Have you seen or consulted any other health care providers outside of the [address] [place] [address] since your last visit? Include any pap smears or colon screening.  Yes, ophthalmology

## 2017-08-10 NOTE — PROGRESS NOTES
HISTORY OF PRESENT ILLNESS  Rylee Coleman is a 80 y.o. female. HPI Comments: 79 yo female with c/o red eyes, arthralgias, chest discomfort, IBS-C. Blood shot eyes for 2-3months. Seen by her eye doctor Dr. Martinez Ibarra. Adjusted glaucoma medication, off travatan, using [place]. She has not noticed improvement with this. Hurting 'all over' back, legs. Feels it is related to OA, OP. Not taking medication for this. Chest discomfort when walking up and down hill, doing housework. Maybe a little SOB. No diaphoresis, nausea  Increased bloating. Using gas x which helps a little. Has chronic constipation. Ta Filter in the past but did not feel this was helping. Nasal drip at night mainly. Some sore throat. Anxiety. Tried reducing benzo use. Notes she does not sleep as well on days when she does not take. Does not take more often then q day. Anxiety   Associated symptoms include chest pain. Pertinent negatives include no headaches and no shortness of breath. Bloated   Associated symptoms include chest pain. Pertinent negatives include no headaches and no shortness of breath. 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 180 Each 0    LORazepam (ATIVAN) 0.5 mg tablet I tab po q day if needed for anxiety 30 Tab 2    escitalopram oxalate (LEXAPRO) 10 mg tablet Take 1 Tab by mouth daily. 90 Tab 5    brimonidine (ALPHAGAN) 0.15 % ophthalmic solution Administer 1 Drop to both eyes two (2) times a day.  SORBITOL by Does Not Apply route.       fluticasone (FLONASE) 50 mcg/actuation nasal spray 1spray each nostril qam 1 Bottle 5    fexofenadine (ALLEGRA) 180 mg tablet Take 1 Tab by mouth daily. 90 Tab 5    travoprost (TRAVATAN Z) 0.004 % ophthalmic solution Administer 1 Drop to both eyes nightly. No current facility-administered medications on file prior to visit. Review of Systems   Constitutional: Negative for chills, fever and weight loss. HENT: Negative for congestion and nosebleeds. Eyes: Positive for redness. Negative for pain. Respiratory: Negative for cough and shortness of breath. Cardiovascular: Positive for chest pain. Negative for palpitations and leg swelling. Gastrointestinal: Negative for heartburn, nausea and vomiting. Genitourinary: Negative for frequency and urgency. Musculoskeletal: Negative for joint pain and myalgias. Neurological: Negative for dizziness, tingling and headaches. Psychiatric/Behavioral: Negative for depression. The patient is not nervous/anxious. Physical Exam   Constitutional: She appears well-developed and well-nourished. No distress. /68 (BP 1 Location: Left arm, BP Patient Position: Sitting)  Pulse 77  Temp 97.8 °F (36.6 °C) (Oral)   Resp 18  Ht 4' 8\" (1.422 m)  Wt 98 lb 6.4 oz (44.6 kg)  SpO2 100%  BMI 22.06 kg/m2     HENT:   Nose: Right sinus exhibits no maxillary sinus tenderness and no frontal sinus tenderness. Left sinus exhibits no maxillary sinus tenderness and no frontal sinus tenderness. Eyes: Right eye exhibits no discharge. Left eye exhibits no discharge. Right conjunctiva is injected. Left conjunctiva is injected. No scleral icterus. Neck: Neck supple. Cardiovascular: Normal rate, regular rhythm and normal heart sounds. Exam reveals no gallop and no friction rub. No murmur heard. Pulmonary/Chest: Effort normal and breath sounds normal. No respiratory distress. She has no wheezes. She has no rales. Abdominal: Soft. There is no tenderness. There is no rebound and no guarding. Musculoskeletal: She exhibits no edema or tenderness. Lymphadenopathy:     She has no cervical adenopathy. Neurological: She is alert. She exhibits normal muscle tone. Skin: Skin is warm and dry. Psychiatric: She has a normal mood and affect. Lab Results   Component Value Date/Time    Sodium 142 01/09/2015 12:10 PM    Potassium 4.5 01/09/2015 12:10 PM    Chloride 98 01/09/2015 12:10 PM    CO2 26 01/09/2015 12:10 PM    Glucose 99 01/09/2015 12:10 PM    BUN 19 01/09/2015 12:10 PM    Creatinine 0.52 01/09/2015 12:10 PM    BUN/Creatinine ratio 37 01/09/2015 12:10 PM    GFR est  01/09/2015 12:10 PM    GFR est non-AA 87 01/09/2015 12:10 PM    Calcium 9.9 01/09/2015 12:10 PM    Bilirubin, total <0.2 01/09/2015 12:10 PM    AST (SGOT) 25 01/09/2015 12:10 PM    Alk. phosphatase 79 01/09/2015 12:10 PM    Protein, total 6.4 01/09/2015 12:10 PM    Albumin 4.7 01/09/2015 12:10 PM    A-G Ratio 2.8 01/09/2015 12:10 PM    ALT (SGPT) 14 01/09/2015 12:10 PM     Lab Results   Component Value Date/Time    WBC 7.9 01/09/2015 12:10 PM    HGB 12.8 01/09/2015 12:10 PM    HCT 39.3 01/09/2015 12:10 PM    PLATELET 932 16/24/6318 12:10 PM    MCV 92 01/09/2015 12:10 PM   No results found for: TSH, TSH2, TSH3, TSHP, TSHEXT  EKG NSR  ASSESSMENT and PLAN    ICD-10-CM ICD-9-CM    1. Chest discomfort R07.89 786.59 LIPID PANEL      METABOLIC PANEL, COMPREHENSIVE      AMB POC EKG ROUTINE W/ 12 LEADS, INTER & REP      CBC WITH AUTOMATED DIFF      REFERRAL TO CARDIOLOGY   2. Other irritable bowel syndrome K58.8 564.1    3. Nasal discharge J34.89 478.19    4. Anxiety F41.9 300.00    EKG unremarkable. Will refer to cardiology given exertional component. Does have prior CXR with ? COPD. Will consider PFTs  IBS is a chronic issue. Consider trying Linzess in the future. Provided info on diet on AVS.   Can try flonase for nasal sx. Continue with ativan which she tolerates and finds effective.

## 2017-08-10 NOTE — MR AVS SNAPSHOT
Visit Information Date & Time Provider Department Dept. Phone Encounter #  
 8/10/2017  1:00 PM [address] [phone] [id number] Follow-up Instructions Return in about 3 months (around 11/10/2017), or if symptoms worsen or fail to improve. Upcoming Health Maintenance Date Due INFLUENZA AGE 9 TO ADULT 9/15/2017* GLAUCOMA SCREENING Q2Y 10/21/2017 MEDICARE YEARLY EXAM 5/10/2018 DTaP/Tdap/Td series (2 - Td) 8/10/2026 *Topic was postponed. The date shown is not the original due date. Allergies as of 8/10/2017  Review Complete On: 8/10/2017 By: Ricardo Patel LPN Severity Noted Reaction Type Reactions Flexeril [Cyclobenzaprine]  09/26/2011   Not Verified Unknown (comments)  
 unknown Pcn [Penicillins]  09/26/2011   Topical Rash Current Immunizations  Reviewed on 10/23/2012 Name Date Influenza High Dose Vaccine PF 10/27/2016, 9/30/2015 Influenza Vaccine Split 10/23/2012, 10/25/2011 Pneumococcal Conjugate (PCV-13) 10/28/2015 Tdap 8/10/2016 Not reviewed this visit You Were Diagnosed With   
  
 Codes Comments Chest discomfort    -  Primary ICD-10-CM: R07.89 ICD-9-CM: 786.59 Other irritable bowel syndrome     ICD-10-CM: K58.8 ICD-9-CM: 083.4 Nasal discharge     ICD-10-CM: J34.89 ICD-9-CM: 478.19 Anxiety     ICD-10-CM: F41.9 ICD-9-CM: 300.00 Vitals BP Pulse Temp Resp Height(growth percentile) Weight(growth percentile) 133/68 (BP 1 Location: Left arm, BP Patient Position: Sitting) 77 97.8 °F (36.6 °C) (Oral) 18 4' 8\" (1.422 m) 98 lb 6.4 oz (44.6 kg) SpO2 BMI OB Status Smoking Status 100% 22.06 kg/m2 Postmenopausal Never Smoker Vitals History BMI and BSA Data Body Mass Index Body Surface Area 22.06 kg/m 2 1.33 m 2 Preferred Pharmacy Pharmacy Name Phone  [address] [phone] Your Updated Medication List  
  
   
This list is accurate as of: 8/10/17  1:58 PM.  Always use your most recent med list.  
  
  
  
  
 brimonidine 0.15 % ophthalmic solution Commonly known as:  Alejandra Mattes Administer 1 Drop to both eyes two (2) times a day. escitalopram oxalate 10 mg tablet Commonly known as:  [place] Aas Take 1 Tab by mouth daily. fluticasone 50 mcg/actuation nasal spray Commonly known as:  Star Serve 2 Sprays by Both Nostrils route daily. GAS-X 80 mg chewable tablet Generic drug:  simethicone Take 80 mg by mouth every six (6) hours as needed for Flatulence. hydrocortisone 25 mg Supp Commonly known as:  ANUCORT-HC Insert 1 Suppository into rectum two (2) times a day. lansoprazole 15 mg capsule Commonly known as:  PREVACID  
1 bid LORazepam 0.5 mg tablet Commonly known as:  ATIVAN I tab po q day if needed for anxiety Prescriptions Sent to Pharmacy Refills  
 escitalopram oxalate (LEXAPRO) 10 mg tablet 5 Sig: Take 1 Tab by mouth daily. Class: Normal  
 Pharmacy:  [facility] [address] [facility] Ph #: [phone] Route: Oral  
 hydrocortisone (ANUCORT-HC) 25 mg supp 0 Sig: Insert 1 Suppository into rectum two (2) times a day. Class: Normal  
 Pharmacy:  [facility] Ph #: [phone] Route: Rectal  
 fluticasone (FLONASE) 50 mcg/actuation nasal spray 3 Si Sprays by Both Nostrils route daily. Class: Normal  
 Pharmacy:  [facility] [address] [facility] Ph #: [phone] Route: Both Nostrils We Performed the Following AMB POC EKG ROUTINE W/ 12 LEADS, INTER & REP [49144 CPT(R)] REFERRAL TO CARDIOLOGY [[facility] Custom] Comments:  
 Please evaluate patient for chest pressure with exertion. Follow-up Instructions Return in about 3 months (around 11/10/2017), or if symptoms worsen or fail to improve.   
  
To-Do List   
 08/10/2017 Lab:  CBC WITH AUTOMATED DIFF   
  
 08/10/2017 Lab:  LIPID PANEL   
  
 08/10/2017 Lab:  METABOLIC PANEL, COMPREHENSIVE Referral Information Referral ID Referred By [facility] To  
  
 [id number]   
   [address] [place] [address] [address] [facility] [address] Phone: [phone] Fax: [phone] Visits Status Start Date End Date 1 New Request 8/10/17 8/10/18 If your referral has a status of pending review or denied, additional information will be sent to support the outcome of this decision. Patient Instructions Diet for Irritable Bowel Syndrome: Care Instructions Your Care Instructions Irritable bowel syndrome, or IBS, is a problem with the intestines. IBS can cause belly pain, bloating, gas, constipation, and diarrhea. Most people can control their symptoms by changing their diet and easing stress. No specific foods cause everyone with IBS to have symptoms. Doctors don't offer a specific diet to manage symptoms. But many people find that they feel better when they stop eating certain foods. A high-fiber diet may help if you have constipation. Follow-up care is a key part of your treatment and safety. Be sure to make and go to all appointments, and call your doctor if you are having problems. It's also a good idea to know your test results and keep a list of the medicines you take. How can you care for yourself at home? To reduce constipation · Include fruits, vegetables, beans, and whole grains in your diet each day. These foods are high in fiber. Slowly increase the amount of fiber you eat. This helps you avoid a lot of gas. · Drink plenty of fluids, enough so that your urine is light yellow or clear like water. If you have kidney, heart, or liver disease and have to limit fluids, talk with your doctor before you increase the amount of fluids you drink. · Get some exercise every day. Build up slowly to 30 to 60 minutes a day on 5 or more days of the week. · Take a fiber supplement, such as Citrucel or Metamucil, every day if needed. Read and follow all instructions on the label. · Schedule time each day for a bowel movement. Having a daily routine may help. Take your time and do not strain when having a bowel movement. · Check with your doctor before you increase the amount of fiber in your diet. For some people who have IBS, eating more fiber may make some symptoms worse. This includes bloating. To reduce diarrhea You may try giving up foods or drinks one at a time to see whether symptoms improve. Limit or avoid the following: · Alcohol · Caffeine, which is found in coffee, tea, cola drinks, and chocolate · Nicotine, from smoking or chewing tobacco 
· Gas-producing foods, such as beans, broccoli, cabbage, and apples · Dairy products that contain lactose (milk sugar), such as ice cream, milk, cheese, and sour cream 
· Foods and drinks high in sugar, especially fruit juice, soda, candy, and other packaged sweets (such as cookies) · Foods high in fat, including farias, sausage, butter, oils, and anything deep-fried · Sorbitol and xylitol, artificial sweeteners found in some sugarless candies and chewing gum Keep track of foods · Some people with IBS use a daily food diary to keep track of what they eat and whether they have any symptoms after eating certain foods. The diary also can be a good way to record what is going on in your life. · Stress plays a role in IBS. So if you are aware that certain stresses bring on symptoms, you can try to reduce those stresses. Keep mealtimes pleasant · Try to maintain a pleasant environment when you eat. This may reduce stress that can make symptoms likely to occur. · Give yourself plenty of time to eat, rather than eating on the go. Chew your food slowly. Try not to swallow air, which can cause bloating. Where can you learn more? Go to http://tootie-mery.info/. Enter N029 in the search box to learn more about \"Diet for Irritable Bowel Syndrome: Care Instructions. \" Current as of: July 26, 2016 Content Version: 11.3 © 2176-4447 [employer]. Care instructions adapted under license by [facility] (which disclaims liability or warranty for this information). If you have questions about a medical condition or this instruction, always ask your healthcare professional. [place] [address] any warranty or liability for your use of this information. Introducing [facility]! [facility] introduces [employer] patient portal. Now you can access parts of your medical record, email your doctor's office, and request medication refills online. 1. In your internet browser, go to [email or web address] 2. Click on the First Time User? Click Here link in the Sign In box. You will see the New Member Sign Up page. 3. Enter your [employer] Access Code exactly as it appears below. You will not need to use this code after youve completed the sign-up process. If you do not sign up before the expiration date, you must request a new code. · [employer] Access Code: [email or web address] Expires: 11/8/2017  1:58 PM 
 
4. Enter the last four digits of your Social Security Number (xxxx) and Date of Birth (mm/dd/yyyy) as indicated and click Submit. You will be taken to the next sign-up page. 5. Create a [employer] ID. This will be your [employer] login ID and cannot be changed, so think of one that is secure and easy to remember. 6. Create a [employer] password. You can change your password at any time. 7. Enter your Password Reset Question and Answer. This can be used at a later time if you forget your password. 8. Enter your e-mail address. You will receive e-mail notification when new information is available in [address] 9. Click Sign Up. You can now view and download portions of your medical record. 10. Click the Download Summary menu link to download a portable copy of your medical information. If you have questions, please visit the Frequently Asked Questions section of the [employer] website. Remember, [employer] is NOT to be used for urgent needs. For medical emergencies, dial 911. Now available from your iPhone and Android! Please provide this summary of care documentation to your next provider. Your primary care clinician is listed as Cydney Gutierrez. If you have any questions after today's visit, please call [phone].

## 2017-08-14 RX ORDER — HYDROCORTISONE ACETATE 25 MG/1
25 SUPPOSITORY RECTAL 2 TIMES DAILY
Qty: 180 EACH | Refills: 0 | Status: SHIPPED | OUTPATIENT
Start: 2017-08-14 | End: 2018-02-12 | Stop reason: SDUPTHER

## 2017-08-14 NOTE — TELEPHONE ENCOUNTER
Patient called in stating the Rx for her suppository was not for a 90 day supply. States he Ins will not will it unless it is a 90 day supply. Can you please re-send the Rx as a 90 day.

## 2017-08-24 ENCOUNTER — OFFICE VISIT (OUTPATIENT)
Dept: CARDIOLOGY CLINIC | Age: 82
End: 2017-08-24

## 2017-08-24 VITALS
DIASTOLIC BLOOD PRESSURE: 80 MMHG | HEIGHT: 56 IN | HEART RATE: 69 BPM | WEIGHT: 98 LBS | OXYGEN SATURATION: 98 % | BODY MASS INDEX: 22.04 KG/M2 | SYSTOLIC BLOOD PRESSURE: 132 MMHG

## 2017-08-24 DIAGNOSIS — I25.9 CHEST PAIN DUE TO MYOCARDIAL ISCHEMIA, UNSPECIFIED ISCHEMIC CHEST PAIN TYPE: Primary | ICD-10-CM

## 2017-08-24 RX ORDER — ASPIRIN 81 MG/1
81 TABLET ORAL DAILY
Qty: 30 TAB | Refills: 6 | Status: SHIPPED | OUTPATIENT
Start: 2017-08-24

## 2017-08-24 NOTE — PATIENT INSTRUCTIONS
Refills ordered for Symbicort.  Discussed the early role for antibiotics for treatment of respiratory infection.  Discussed the potential role for Albuterol for control of acute resp symptoms.   Return visit 6 months with CXR before.       Start Aspirin 81mg daily    [facility] will call to schedule 2 tests      Transthoracic Echocardiogram: About This Test  What is it? An echocardiogram (also called an echo) uses sound waves to make an image of your heart. A device called a transducer sends sound waves that echo off your heart and back to the transducer. These echoes are turned into moving pictures of your heart that can be seen on a video screen. In a transthoracic echocardiogram (TTE), the transducer is moved across your chest or belly. A TTE is the most common type of echocardiogram.  Why is this test done? This test is done to check your heart health. It's used for many reasons. Your doctor may do an echocardiogram to:  · Check a heart murmur. · Look for the cause of shortness of breath or unexplained chest pain or pressure. · Check how well your heart is pumping blood. · Check to see how well your heart valves are working. · Look for blood clots inside your heart. What happens during the test?  · You will remove your clothes above your waist. You may be given a cloth or paper covering to use during the test.  · You will lie on your back or on your left side on a bed or table. · You may receive medicine through a vein (intravenously, or IV). The IV can be used to give you a contrast material, which helps your doctor get good views of your heart. · Small pads or patches (electrodes) will be taped to your arms and legs to record your heart rate during the test.  · A small amount of gel will be rubbed on the side of your chest to help  the sound waves. · The transducer will be pressed firmly against your chest and moved slowly back and forth. It is usually moved to different areas on your chest or belly to get specific views of your heart. · You will be asked to do several things, such as hold very still, breathe in and out very slowly, hold your breath, or lie on your left side. This test usually takes 30 to 60 minutes.   What else should you know about the test?  · You will not have any pain from an echocardiogram. You may have a brief, sharp pain if an intravenous (IV) needle is placed in a vein in your arm. · No electricity passes through your body during the test. There is no danger of getting an electrical shock. · You do not receive any radiation. What happens after the test?  · You will probably be able to go home right away. · You can go back to your usual activities right away. Follow-up care is a key part of your treatment and safety. Be sure to make and go to all appointments, and call your doctor if you are having problems. It's also a good idea to keep a list of the medicines you take. Ask your doctor when you can expect to have your test results. Where can you learn more? Go to http://tootie-mery.info/. Enter E130 in the search box to learn more about \"Transthoracic Echocardiogram: About This Test.\"  Current as of: April 3, 2017  Content Version: 11.3  © 8556-0133 [employer]. Care instructions adapted under license by [employer] (which disclaims liability or warranty for this information). If you have questions about a medical condition or this instruction, always ask your healthcare professional. [address] any warranty or liability for your use of this information. Cardiac Perfusion Scan (Medicine): About This Test  What is it? A cardiac perfusion scan measures the amount of blood in your heart muscle at rest and after your heart has been made to work hard. During the scan, a camera takes pictures of your heart after a radioactive tracer is injected into a vein in your arm. The tracer travels through the blood and into your heart. As the tracer moves through your heart, areas that have good blood flow absorb the tracer. Areas that do not absorb the tracer may not be getting enough blood or may have been damaged by a heart attack.  The pictures show this difference. Two sets of pictures may be made during the test. One set is taken while you are resting. Another set is taken after your heart has been made to work harder (called a stress test). The heart can be stressed by using medicine or exercise. This information is about using medicine to stress the heart. This test is also known by other names, including myocardial perfusion scan, myocardial perfusion imaging, thallium scan, sestamibi cardiac scan, and nuclear stress test.  Why is this test done? The test is often done to find out what may be causing chest pain or pressure. It may be done after a heart attack to see if areas of the heart are not getting enough blood or to find out how much your heart has been damaged from the heart attack. How can you prepare for the test?  Tell your doctor if:  · You take medicine for an erection problem, such as sildenafil (Viagra), tadalafil (Cialis), and vardenafil (Levitra). You may need to take nitroglycerin during this test, which can cause a serious reaction if you have taken a medicine for an erection problem within the past 48 hours. · You have had bleeding problems, or if you take aspirin or some other blood thinner. · You are or might be pregnant. · You are breastfeeding. Don't use your breast milk for 1 to 2 days after the scan. Use formula instead. What happens during the test?  Resting or baseline scan  · You will take your top off and be given a gown to wear. · Electrodes will be attached to your chest to keep track of your heartbeats. · Your arm will be cleaned. You will have a tube, called an IV, going into your arm. A small amount of the radioactive tracer will be put in the IV. · You will lie on your back or your stomach on a table with a large camera positioned above your chest. The camera records the tracer's signals as it moves through your blood.  The camera does not produce any radiation, so you are not exposed to any additional radiation while the scan is being done. · You will be asked to remain very still during each scan, which takes about 5 to 10 minutes. The camera will move to take more pictures at different angles. Several scans will be taken. This test takes about 30 to 40 minutes. Stress scan using medicine  The stress scan is done in two parts. In many hospitals, you first have the resting scan. You are then given a medicine that makes your heart work harder and you have another scan. Sometimes the stress scan is done first.  · You will have a test called an electrocardiogram (EKG or ECG), which takes about 5 to 10 minutes. You may have other EKGs during and after the stress test.  · Medicine will be put in your IV. It will make your heart work harder. You may get a headache and feel dizzy, flushed, and nauseated from the medicine, but these symptoms usually do not last long. · Your heartbeat and blood pressure may be checked. · Your symptoms such as chest pain and shortness of breath will be checked. · A few minutes after you get the medicine, another small amount of radioactive tracer is injected. You may be given something to reverse the medicine used to make your heart work harder. · You will wait for 30 to 40 minutes and then have another resting scan. See the \"Resting or baseline scan\" section. This test takes about 30 to 40 minutes. What else should you know about the test?  · Sometimes more pictures are taken 2 to 4 hours after the stress scan. · No electricity passes through your body during the test. There is no danger of getting an electrical shock. What happens after the test?  · You can go back to your usual activities right away. · You will probably be able to go home right away. · Drink plenty of fluids for the next 24 hours to help flush the tracer out of your body.  If you have kidney, heart, or liver disease and have to limit fluids, talk with your doctor before you increase the amount of fluids you drink. When should you call for help? Call 911 anytime you think you may need emergency care. For example, call if:  · You passed out (lost consciousness). · You have been diagnosed with angina, and you have angina symptoms that do not go away with rest or are not getting better within 5 minutes after you take a dose of nitroglycerin. · You have symptoms of a heart attack. These may include:  ¨ Chest pain or pressure, or a strange feeling in the chest.  ¨ Sweating. ¨ Shortness of breath. ¨ Nausea or vomiting. ¨ Pain, pressure, or a strange feeling in the back, neck, jaw, or upper belly or in one or both shoulders or arms. ¨ Lightheadedness or sudden weakness. ¨ A fast or irregular heartbeat. After you call 911, the  may tell you to chew 1 adult-strength or 2 to 4 low-dose aspirin. Wait for an ambulance. Do not try to drive yourself. Call your doctor now or seek immediate medical care if:  · You have had any angina symptoms, such as chest pain or pressure, even if they have gone away. · You have new or increased shortness of breath. · You are dizzy or lightheaded, or you feel like you may faint. Watch closely for changes in your health, and be sure to contact your doctor if you have any problems. Follow-up care is a key part of your treatment and safety. Be sure to make and go to all appointments, and call your doctor if you are having problems. It's also a good idea to keep a list of the medicines you take. Ask your doctor when you can expect to have your test results. Where can you learn more? Go to http://tootie-mery.info/. Enter R320 in the search box to learn more about \"Cardiac Perfusion Scan (Medicine): About This Test.\"  Current as of: May 2, 2016  Content Version: 11.3  © 0443-2430 [employer]. Care instructions adapted under license by [employer] (which disclaims liability or warranty for this information).  If you have questions about a medical condition or this instruction, always ask your healthcare professional. [place] [address] any warranty or liability for your use of this information.

## 2017-08-24 NOTE — MR AVS SNAPSHOT
Visit Information Date & Time Provider Department Dept. Phone Encounter #  
 8/24/2017  2:00 PM Aniceto Mayorga MD [address] [place] [address] Specialist at [facility] [phone] [id number] Follow-up Instructions Return in about 4 weeks (around 9/21/2017). Your Appointments 9/21/2017  2:15 PM  
Follow Up with Jerald Jensen MD  
Cardio Specialist at [facility]) Appt Note: after echo  
 [facility] [address] [place]  
  
    
 11/10/2017  1:00 PM  
Office Visit with Brittany Mooney MD  
[facility]) Appt Note: 3 month f/u  
 [address]  
  
   
 [address] [facility] Upcoming Health Maintenance Date Due  
 GLAUCOMA SCREENING Q2Y 10/21/2017 INFLUENZA AGE 9 TO ADULT 9/15/2017* MEDICARE YEARLY EXAM 5/10/2018 DTaP/Tdap/Td series (2 - Td) 8/10/2026 *Topic was postponed. The date shown is not the original due date. Allergies as of 8/24/2017  Review Complete On: 8/24/2017 By: Oliver Donaldson LPN Severity Noted Reaction Type Reactions Flexeril [Cyclobenzaprine]  09/26/2011   Not Verified Unknown (comments)  
 unknown Pcn [Penicillins]  09/26/2011   Topical Rash Current Immunizations  Reviewed on 10/23/2012 Name Date Influenza High Dose Vaccine PF 10/27/2016, 9/30/2015 Influenza Vaccine Split 10/23/2012, 10/25/2011 Pneumococcal Conjugate (PCV-13) 10/28/2015 Tdap 8/10/2016 Not reviewed this visit You Were Diagnosed With   
  
 Codes Comments Chest pain due to myocardial ischemia, unspecified ischemic chest pain type ([facility]    -  Primary ICD-10-CM: I20.9 ICD-9-CM: 786.50 Vitals BP Pulse Height(growth percentile) Weight(growth percentile) SpO2 BMI 132/80 69 4' 8\" (1.422 m) 98 lb (44.5 kg) 98% 21.97 kg/m2 OB Status Smoking Status Postmenopausal Never Smoker Vitals History BMI and BSA Data Body Mass Index Body Surface Area  
 21.97 kg/m 2 1.33 m 2 Preferred Pharmacy Pharmacy Name Phone [facility] [phone] Your Updated Medication List  
  
   
This list is accurate as of: 8/24/17  2:41 PM.  Always use your most recent med list.  
  
  
  
  
 brimonidine 0.15 % ophthalmic solution Commonly known as:  Candis Blare Administer 1 Drop to both eyes two (2) times a day. escitalopram oxalate 10 mg tablet Commonly known as:  Colen Brunner Take 1 Tab by mouth daily. fluticasone 50 mcg/actuation nasal spray Commonly known as:  More Kalie 2 Sprays by Both Nostrils route daily. GAS-X 80 mg chewable tablet Generic drug:  simethicone Take 80 mg by mouth every six (6) hours as needed for Flatulence. hydrocortisone 25 mg Supp Commonly known as:  ANUCORT-HC Insert 1 Suppository into rectum two (2) times a day. lansoprazole 15 mg capsule Commonly known as:  PREVACID  
1 bid LORazepam 0.5 mg tablet Commonly known as:  ATIVAN I tab po q day if needed for anxiety Follow-up Instructions Return in about 4 weeks (around 9/21/2017). To-Do List   
 08/31/2017 ECHO:  2D ECHO COMPLETE ADULT (TTE) W OR WO CONTR   
  
 08/31/2017 Imaging:  NM CARDIAC SPECT W STRS/REST MULT   
  
 08/31/2017 ECG:  NUCLEAR STRESS TEST Patient Instructions Start Aspirin 81mg daily Merryl Kawasaki will call to schedule 2 tests Transthoracic Echocardiogram: About This Test 
What is it? An echocardiogram (also called an echo) uses sound waves to make an image of your heart. A device called a transducer sends sound waves that echo off your heart and back to the transducer.  These echoes are turned into moving pictures of your heart that can be seen on a video screen. In a transthoracic echocardiogram (TTE), the transducer is moved across your chest or belly. A TTE is the most common type of echocardiogram. 
Why is this test done? This test is done to check your heart health. It's used for many reasons. Your doctor may do an echocardiogram to: · Check a heart murmur. · Look for the cause of shortness of breath or unexplained chest pain or pressure. · Check how well your heart is pumping blood. · Check to see how well your heart valves are working. · Look for blood clots inside your heart. What happens during the test? 
· You will remove your clothes above your waist. You may be given a cloth or paper covering to use during the test. 
· You will lie on your back or on your left side on a bed or table. · You may receive medicine through a vein (intravenously, or IV). The IV can be used to give you a contrast material, which helps your doctor get good views of your heart. · Small pads or patches (electrodes) will be taped to your arms and legs to record your heart rate during the test. 
· A small amount of gel will be rubbed on the side of your chest to help  the sound waves. · The transducer will be pressed firmly against your chest and moved slowly back and forth. It is usually moved to different areas on your chest or belly to get specific views of your heart. · You will be asked to do several things, such as hold very still, breathe in and out very slowly, hold your breath, or lie on your left side. This test usually takes 30 to 60 minutes. What else should you know about the test? 
· You will not have any pain from an echocardiogram. You may have a brief, sharp pain if an intravenous (IV) needle is placed in a vein in your arm. · No electricity passes through your body during the test. There is no danger of getting an electrical shock. · You do not receive any radiation. What happens after the test? 
· You will probably be able to go home right away. · You can go back to your usual activities right away. Follow-up care is a key part of your treatment and safety. Be sure to make and go to all appointments, and call your doctor if you are having problems. It's also a good idea to keep a list of the medicines you take. Ask your doctor when you can expect to have your test results. Where can you learn more? Go to http://tootie-mery.info/. Enter E130 in the search box to learn more about \"Transthoracic Echocardiogram: About This Test.\" Current as of: April 3, 2017 Content Version: 11.3 © 3086-6331 [employer]. Care instructions adapted under license by [employer] (which disclaims liability or warranty for this information). If you have questions about a medical condition or this instruction, always ask your healthcare professional. [place] [address] any warranty or liability for your use of this information. Cardiac Perfusion Scan (Medicine): About This Test 
What is it? A cardiac perfusion scan measures the amount of blood in your heart muscle at rest and after your heart has been made to work hard. During the scan, a camera takes pictures of your heart after a radioactive tracer is injected into a vein in your arm. The tracer travels through the blood and into your heart. As the tracer moves through your heart, areas that have good blood flow absorb the tracer. Areas that do not absorb the tracer may not be getting enough blood or may have been damaged by a heart attack. The pictures show this difference. Two sets of pictures may be made during the test. One set is taken while you are resting. Another set is taken after your heart has been made to work harder (called a stress test). The heart can be stressed by using medicine or exercise. This information is about using medicine to stress the heart. This test is also known by other names, including myocardial perfusion scan, myocardial perfusion imaging, thallium scan, sestamibi cardiac scan, and nuclear stress test. 
Why is this test done? The test is often done to find out what may be causing chest pain or pressure. It may be done after a heart attack to see if areas of the heart are not getting enough blood or to find out how much your heart has been damaged from the heart attack. How can you prepare for the test? 
Tell your doctor if: 
· You take medicine for an erection problem, such as sildenafil (Viagra), tadalafil (Cialis), and vardenafil (Levitra). You may need to take nitroglycerin during this test, which can cause a serious reaction if you have taken a medicine for an erection problem within the past 48 hours. · You have had bleeding problems, or if you take aspirin or some other blood thinner. · You are or might be pregnant. · You are breastfeeding. Don't use your breast milk for 1 to 2 days after the scan. Use formula instead. What happens during the test? 
Resting or baseline scan · You will take your top off and be given a gown to wear. · Electrodes will be attached to your chest to keep track of your heartbeats. · Your arm will be cleaned. You will have a tube, called an IV, going into your arm. A small amount of the radioactive tracer will be put in the IV. · You will lie on your back or your stomach on a table with a large camera positioned above your chest. The camera records the tracer's signals as it moves through your blood. The camera does not produce any radiation, so you are not exposed to any additional radiation while the scan is being done. · You will be asked to remain very still during each scan, which takes about 5 to 10 minutes. The camera will move to take more pictures at different angles. Several scans will be taken. This test takes about 30 to 40 minutes. Stress scan using medicine The stress scan is done in two parts. In many hospitals, you first have the resting scan. You are then given a medicine that makes your heart work harder and you have another scan. Sometimes the stress scan is done first. 
· You will have a test called an electrocardiogram (EKG or ECG), which takes about 5 to 10 minutes. You may have other EKGs during and after the stress test. 
· Medicine will be put in your IV. It will make your heart work harder. You may get a headache and feel dizzy, flushed, and nauseated from the medicine, but these symptoms usually do not last long. · Your heartbeat and blood pressure may be checked. · Your symptoms such as chest pain and shortness of breath will be checked. · A few minutes after you get the medicine, another small amount of radioactive tracer is injected. You may be given something to reverse the medicine used to make your heart work harder. · You will wait for 30 to 40 minutes and then have another resting scan. See the \"Resting or baseline scan\" section. This test takes about 30 to 40 minutes. What else should you know about the test? 
· Sometimes more pictures are taken 2 to 4 hours after the stress scan. · No electricity passes through your body during the test. There is no danger of getting an electrical shock. What happens after the test? 
· You can go back to your usual activities right away. · You will probably be able to go home right away. · Drink plenty of fluids for the next 24 hours to help flush the tracer out of your body. If you have kidney, heart, or liver disease and have to limit fluids, talk with your doctor before you increase the amount of fluids you drink. When should you call for help? Call 911 anytime you think you may need emergency care. For example, call if: 
· You passed out (lost consciousness).  
· You have been diagnosed with angina, and you have angina symptoms that do not go away with rest or are not getting better within 5 minutes after you take a dose of nitroglycerin. · You have symptoms of a heart attack. These may include: ¨ Chest pain or pressure, or a strange feeling in the chest. 
¨ Sweating. ¨ Shortness of breath. ¨ Nausea or vomiting. ¨ Pain, pressure, or a strange feeling in the back, neck, jaw, or upper belly or in one or both shoulders or arms. ¨ Lightheadedness or sudden weakness. ¨ A fast or irregular heartbeat. After you call 911, the  may tell you to chew 1 adult-strength or 2 to 4 low-dose aspirin. Wait for an ambulance. Do not try to drive yourself. Call your doctor now or seek immediate medical care if: 
· You have had any angina symptoms, such as chest pain or pressure, even if they have gone away. · You have new or increased shortness of breath. · You are dizzy or lightheaded, or you feel like you may faint. Watch closely for changes in your health, and be sure to contact your doctor if you have any problems. Follow-up care is a key part of your treatment and safety. Be sure to make and go to all appointments, and call your doctor if you are having problems. It's also a good idea to keep a list of the medicines you take. Ask your doctor when you can expect to have your test results. Where can you learn more? Go to http://tootie-mery.info/. Enter R320 in the search box to learn more about \"Cardiac Perfusion Scan (Medicine): About This Test.\" Current as of: May 2, 2016 Content Version: 11.3 © 1122-5093 [employer]. Care instructions adapted under license by [employer] (which disclaims liability or warranty for this information). If you have questions about a medical condition or this instruction, always ask your healthcare professional. [place] [address] any warranty or liability for your use of this information. Introducing \[facility]\"" & HEALTH SERVICES! [employer] introduces Maven patient portal. Now you can access parts of your medical record, email your doctor's office, and request medication refills online. 1. In your internet browser, go to [email or web address] 2. Click on the First Time User? Click Here link in the Sign In box. You will see the New Member Sign Up page. 3. Enter your Maven Access Code exactly as it appears below. You will not need to use this code after youve completed the sign-up process. If you do not sign up before the expiration date, you must request a new code. · Maven Access Code: [email or web address] Expires: 11/8/2017  1:58 PM 
 
4. Enter the last four digits of your Social Security Number (xxxx) and Date of Birth (mm/dd/yyyy) as indicated and click Submit. You will be taken to the next sign-up page. 5. Create a Maven ID. This will be your Maven login ID and cannot be changed, so think of one that is secure and easy to remember. 6. Create a Maven password. You can change your password at any time. 7. Enter your Password Reset Question and Answer. This can be used at a later time if you forget your password. 8. Enter your e-mail address. You will receive e-mail notification when new information is available in [address] 9. Click Sign Up. You can now view and download portions of your medical record. 10. Click the Download Summary menu link to download a portable copy of your medical information. If you have questions, please visit the Frequently Asked Questions section of the Maven website. Remember, Maven is NOT to be used for urgent needs. For medical emergencies, dial 911. Now available from your iPhone and Android! Please provide this summary of care documentation to your next provider. Your primary care clinician is listed as [address] [place] [address] Ave. If you have any questions after today's visit, please call [phone].

## 2017-08-24 NOTE — PROGRESS NOTES
Cardiovascular Specialists    Ms. Selam Chandler is an 80year old female with a history of hypertension, depression, glaucoma and other multiple medical problems. Ms. Selam Chandler was asked to come see me for the evaluation of chest pressure and dyspnea, which she has been experiencing for the last one year. According to further questioning, she said she does not have any known myocardial infarction or congestive heart failure. She has been experiencing chest pressure and dyspnea on and off for the last one year. This usually lasts probably 1-2 minutes with exertion and resolves quickly with rest.  This discomfort she describes as a somewhat burning discomfort in the chest and more dyspnea. There is no radiation. There is no associated nausea, vomiting or diaphoresis. She was seen by PCP and she was asked to come see me. She denies any PND or lower extremity swelling. She denies any presyncope or syncope. Denies any nausea, vomiting, abdominal pain, fever, chills, sputum production. 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  simethicone (GAS-X) 80 mg chewable tablet Take 80 mg by mouth every six (6) hours as needed for Flatulence.  escitalopram oxalate (LEXAPRO) 10 mg tablet Take 1 Tab by mouth daily.     fluticasone (FLONASE) 50 mcg/actuation nasal spray 2 Sprays by Both Nostrils route daily.  lansoprazole (PREVACID) 15 mg capsule 1 bid    LORazepam (ATIVAN) 0.5 mg tablet I tab po q day if needed for anxiety    brimonidine (ALPHAGAN) 0.15 % ophthalmic solution Administer 1 Drop to both eyes two (2) times a day. No current facility-administered medications for this visit. Allergies and Sensitivities:  Allergies   Allergen Reactions    Flexeril [Cyclobenzaprine] Unknown (comments)     unknown    Pcn [Penicillins] Rash       Family History:  Family History   Problem Relation Age of Onset    Cancer Mother     Heart Disease Father     Hypertension Sister     Diabetes Brother     Heart Disease Brother     Cancer Brother     Heart Disease Sister        Social History:  Social History   Substance Use Topics    Smoking status: Never Smoker    Smokeless tobacco: Never Used    Alcohol use No     She  reports that she has never smoked. She has never used smokeless tobacco.  She  reports that she does not drink alcohol. Review of Systems:  Cardiac symptoms as noted above in HPI. All others negative. Denies fatigue, malaise, skin rash, joint pain, blurring vision, photophobia, neck pain, hemoptysis, chronic cough, nausea, vomiting, hematuria, burning micturition, BRBPR, chronic headaches. Physical Exam:  BP Readings from Last 3 Encounters:   08/24/17 132/80   08/10/17 133/68   05/09/17 151/59         Pulse Readings from Last 3 Encounters:   08/24/17 69   08/10/17 77   05/09/17 83          Wt Readings from Last 3 Encounters:   08/24/17 98 lb (44.5 kg)   08/10/17 98 lb 6.4 oz (44.6 kg)   05/09/17 95 lb 3.2 oz (43.2 kg)       Constitutional: Oriented to person, place, and time. HENT: Head: Normocephalic and atraumatic. Eyes: Conjunctivae and extraocular motions are normal.   Neck: No JVD present. Carotid bruit is not appreciated. Cardiovascular: Regular rhythm.    No murmur, gallop or rubs appreciated  Lung: Breath sounds normal. No respiratory distress. No ronchi or rales appreciated  Abdominal: No tenderness. No rebound and no guarding. Musculoskeletal: There is no lower extremity edema. No cynosis  Lymphadenopathy:  No cervical or supraclavicular adenopathy appriciated. Neurological: No gross motor deficit noted. Skin: No visible skin rash noted. No Ear discharge noted  Psychiatric: Normal mood and affect. Good distal pulse      Review of Data  LABS:   Lab Results   Component Value Date/Time    Sodium 140 08/10/2017 02:04 PM    Potassium 3.9 08/10/2017 02:04 PM    Chloride 103 08/10/2017 02:04 PM    CO2 29 08/10/2017 02:04 PM    Glucose 93 08/10/2017 02:04 PM    BUN 19 08/10/2017 02:04 PM    Creatinine 0.64 08/10/2017 02:04 PM     Lipids Latest Ref Rng & Units 8/10/2017 3/20/2014   Chol, Total <200 MG/ 191   HDL 40 - 60 MG/DL 73(H) 71   LDL 0 - 100 MG/DL 99.2 104(H)   Trig <150 MG/DL 79 79   Chol/HDL Ratio 0 - 5.0   2.6 -   Some recent data might be hidden     Lab Results   Component Value Date/Time    ALT (SGPT) 23 08/10/2017 02:04 PM     No results found for: HBA1C, HGBE8, DAB3IRKY, JGI3FCJD, QFQ2LIKT    EKG  (08/17) Sinus rhythm at 71 beats per minute. No pathologic Q wave. Normal [place] and QRS interval.    ECHO    STRESS TEST (EST, PHARM, NUC, ECHO etc)    IMPRESSION & PLAN:  Ms. Johnnie Rahman is an 80year old female with a history of hypertension. Chest pain/dyspnea:  Ms. Johnnie Rahman has been experiencing on and off exertional chest pressure, burning in character, along with some dyspnea for the last one year, slowly increasing in frequency, associated with exertion. This is concerning for angina. I discussed management strategy with the patient. After a lengthy discussion, she is agreeable to at least start with aspirin, sublingual nitroglycerin. I am going to order stress test and echocardiogram to rule out any hypertensive cardiovascular heart disease and to rule out any ischemia.   She was advised to go to the emergency department if she does not have any episode that does not resolve with sublingual nitroglycerin. Possible hypertension:  Ms. Blanquita Holt when she entered the clinic initially her blood pressure was elevated at 170/79 mmHg, however we repeated the blood pressure again and blood pressure was 132/80 mmHg. She denies any prior history of hypertension. She may have white coat syndrome. For now I would like to continue with clinical observation. Importance of diet and exercise was discussed with patient. This plan was discussed with patient who is in agreement. Thank you for allowing me to participate in patient care. Please feel free to call me if you have any question or concern. Margarita Coleman MD  Please note: This document has been produced using voice recognition software. Unrecognized errors in transcription may be present.

## 2017-08-31 ENCOUNTER — HOSPITAL ENCOUNTER (OUTPATIENT)
Dept: NON INVASIVE DIAGNOSTICS | Age: 82
Discharge: HOME | End: 2017-08-31
Attending: INTERNAL MEDICINE
Payer: MEDICARE

## 2017-08-31 ENCOUNTER — HOSPITAL ENCOUNTER (OUTPATIENT)
Dept: NUCLEAR MEDICINE | Age: 82
Discharge: HOME | End: 2017-08-31
Attending: INTERNAL MEDICINE
Payer: MEDICARE

## 2017-08-31 DIAGNOSIS — I25.9 CHEST PAIN DUE TO MYOCARDIAL ISCHEMIA, UNSPECIFIED ISCHEMIC CHEST PAIN TYPE: ICD-10-CM

## 2017-08-31 PROCEDURE — 93306 TTE W/DOPPLER COMPLETE: CPT

## 2017-08-31 PROCEDURE — 74011250636 HC RX REV CODE- 250/636: Performed by: INTERNAL MEDICINE

## 2017-08-31 PROCEDURE — 93017 CV STRESS TEST TRACING ONLY: CPT

## 2017-08-31 PROCEDURE — 78452 HT MUSCLE IMAGE SPECT MULT: CPT

## 2017-08-31 RX ADMIN — REGADENOSON 0.4 MG: 0.08 INJECTION, SOLUTION INTRAVENOUS at 09:28

## 2017-09-21 ENCOUNTER — OFFICE VISIT (OUTPATIENT)
Dept: CARDIOLOGY CLINIC | Age: 82
End: 2017-09-21

## 2017-09-21 VITALS
SYSTOLIC BLOOD PRESSURE: 158 MMHG | HEART RATE: 80 BPM | WEIGHT: 97 LBS | BODY MASS INDEX: 21.82 KG/M2 | OXYGEN SATURATION: 98 % | HEIGHT: 56 IN | DIASTOLIC BLOOD PRESSURE: 71 MMHG

## 2017-09-21 DIAGNOSIS — I25.9 CHEST PAIN DUE TO MYOCARDIAL ISCHEMIA, UNSPECIFIED ISCHEMIC CHEST PAIN TYPE: Primary | ICD-10-CM

## 2017-09-21 NOTE — PROGRESS NOTES
[facility]    Ms. Sky Montaño is an 80 y.o. female with a history of hypertension, depression, glaucoma and other multiple medical problems. Ms. Sky Montaño was asked to come see me for the evaluation of chest pressure and dyspnea, which she has been experiencing for the last one year. Underwent stress test and ECHO last visit  Feeling somewhat better since last visit  No prolong chest pain  Occasional dyspnea on exertion. Denies any nausea, vomiting, abdominal pain, fever, chills, sputum production. No hematuria or other bleeding complaints    Past Medical History:   Diagnosis Date    Cancer ([address] 8/2001    skin ca; chest     Degenerative arthritis of lumbar spine 9/26/2011    Depression 9/26/2011    Glaucoma     Hypertension     Menopause     Osteoporosis     Scoliosis     Vitamin D deficiency          Past Surgical History:   Procedure Laterality Date    BREAST SURGERY PROCEDURE UNLISTED      HX BREAST LUMPECTOMY      bilateral; non cancerous    HX GYN  8/2001    adhesion of the uterus        Current Outpatient Prescriptions   Medication Sig    aspirin delayed-release 81 mg tablet Take 1 Tab by mouth daily.  hydrocortisone (ANUCORT-HC) 25 mg supp Insert 1 Suppository into rectum two (2) times a day.  simethicone (GAS-X) 80 mg chewable tablet Take 80 mg by mouth every six (6) hours as needed for Flatulence.  escitalopram oxalate (LEXAPRO) 10 mg tablet Take 1 Tab by mouth daily.  fluticasone (FLONASE) 50 mcg/actuation nasal spray 2 Sprays by Both Nostrils route daily.  lansoprazole (PREVACID) 15 mg capsule 1 bid    LORazepam (ATIVAN) 0.5 mg tablet I tab po q day if needed for anxiety    brimonidine (ALPHAGAN) 0.15 % ophthalmic solution Administer 1 Drop to both eyes two (2) times a day. No current facility-administered medications for this visit.         Allergies and Sensitivities:  Allergies   Allergen Reactions    Flexeril [Cyclobenzaprine] Unknown (comments)     unknown    Pcn [Penicillins] Rash       Family History:  Family History   Problem Relation Age of Onset    Cancer Mother     Heart Disease Father     Hypertension Sister     Diabetes Brother     Heart Disease Brother     Cancer Brother     Heart Disease Sister        Social History:  Social History   Substance Use Topics    Smoking status: Never Smoker    Smokeless tobacco: Never Used    Alcohol use No     She  reports that she has never smoked. She has never used smokeless tobacco.  She  reports that she does not drink alcohol. Review of Systems:  Cardiac symptoms as noted above in HPI. All others negative. Denies fatigue, malaise, skin rash, joint pain, blurring vision, photophobia, neck pain, hemoptysis, chronic cough, nausea, vomiting, hematuria, burning micturition, BRBPR, chronic headaches. Physical Exam:  BP Readings from Last 3 Encounters:   09/21/17 158/71   08/24/17 132/80   08/10/17 133/68         Pulse Readings from Last 3 Encounters:   09/21/17 80   08/24/17 69   08/10/17 77          Wt Readings from Last 3 Encounters:   09/21/17 97 lb (44 kg)   08/24/17 98 lb (44.5 kg)   08/10/17 98 lb 6.4 oz (44.6 kg)       Constitutional: Oriented to person, place, and time. HENT: Head: Normocephalic and atraumatic. Eyes: Conjunctivae and extraocular motions are normal.   Neck: No JVD present. Carotid bruit is not appreciated. Cardiovascular: Regular rhythm. No murmur, gallop or rubs appreciated  Lung: Breath sounds normal. No respiratory distress. No ronchi or rales appreciated  Abdominal: No tenderness. No rebound and no guarding. Musculoskeletal: There is no lower extremity edema.  No cynosis    Review of Data  LABS:   Lab Results   Component Value Date/Time    Sodium 140 08/10/2017 02:04 PM    Potassium 3.9 08/10/2017 02:04 PM    Chloride 103 08/10/2017 02:04 PM    CO2 29 08/10/2017 02:04 PM    Glucose 93 08/10/2017 02:04 PM BUN 19 08/10/2017 02:04 PM    Creatinine 0.64 08/10/2017 02:04 PM     Lipids Latest Ref Rng & Units 8/10/2017 3/20/2014   Chol, Total <200 MG/ 191   HDL 40 - 60 MG/DL 73(H) 71   LDL 0 - 100 MG/DL 99.2 104(H)   Trig <150 MG/DL 79 79   Chol/HDL Ratio 0 - 5.0   2.6 -   Some recent data might be hidden     Lab Results   Component Value Date/Time    ALT (SGPT) 23 08/10/2017 02:04 PM     No results found for: HBA1C, HGBE8, PCH8NMKY, BHZ1HKLD, PLQ2KYBK    EKG  (08/17) Sinus rhythm at 71 beats per minute. No pathologic Q wave. Normal NM and QRS interval.    ECHO (09/17)  SUMMARY:  Left ventricle: Systolic function was normal. Ejection fraction was estimated   in the range of 55 % to 60 %. There were  no regional wall motion abnormalities. Wall thickness was normal. Left   ventricular diastolic function parameters were  normal for the patient's age. Right ventricle: Systolic function was normal.    Mitral valve: There was mild regurgitation. Aortic valve: There was no stenosis. Tricuspid valve: There was mild regurgitation. Pulmonic valve: There was mild regurgitation. STRESS TEST (09/17)  1. Pharmacologic nuclear stress test using Lexiscan. 2. EKG changes with Lexiscan : Nonspecific ST-T changes with Lexiscan infusion  3. There was no convincing evidence of significant reversible or fixed defect to  suggest ongoing ischemia or prior infarct. Fixed basilar septal defect is likely  from attenuation artifact  4. Calculated ejection fraction of 92%. Regional wall motion normal. LV end  diastolic volume 32 ml  5. Low risk stress test     IMPRESSION & PLAN:  Ms. Volodymyr Nelson is an 80year old female with a history of hypertension. Chest pain/dyspnea:  Ms. Volodymyr Nelson has been experiencing on and off exertional chest pressure, burning in character, along with some dyspnea for the last one year, slowly increasing in frequency, associated with exertion.     Nuclear stress test and echo ,low risk, as mentioned above in 08/17  On ASA  DW patient risk factor modification  She feels much better since last time  continue observation. Possible hypertension:  BP is 157/81 mm Hg. ?? White coat syndrome. Continue to observe. Importance of diet and exercise was discussed with patient. This plan was discussed with patient who is in agreement. Thank you for allowing me to participate in patient care. Please feel free to call me if you have any question or concern. Lupe Diamond MD  Please note: This document has been produced using voice recognition software. Unrecognized errors in transcription may be present.

## 2017-10-02 ENCOUNTER — HOSPITAL ENCOUNTER (OUTPATIENT)
Dept: MAMMOGRAPHY | Age: 82
Discharge: HOME | End: 2017-10-02
Attending: INTERNAL MEDICINE
Payer: MEDICARE

## 2017-10-02 DIAGNOSIS — Z12.31 VISIT FOR SCREENING MAMMOGRAM: ICD-10-CM

## 2017-10-02 PROCEDURE — 77063 BREAST TOMOSYNTHESIS BI: CPT

## 2017-11-10 ENCOUNTER — OFFICE VISIT (OUTPATIENT)
Dept: INTERNAL MEDICINE CLINIC | Age: 82
End: 2017-11-10

## 2017-11-10 VITALS
TEMPERATURE: 96.5 F | BODY MASS INDEX: 22.04 KG/M2 | OXYGEN SATURATION: 98 % | HEART RATE: 76 BPM | SYSTOLIC BLOOD PRESSURE: 140 MMHG | DIASTOLIC BLOOD PRESSURE: 57 MMHG | HEIGHT: 56 IN | RESPIRATION RATE: 16 BRPM | WEIGHT: 98 LBS

## 2017-11-10 DIAGNOSIS — M47.816 SPONDYLOSIS OF LUMBAR REGION WITHOUT MYELOPATHY OR RADICULOPATHY: ICD-10-CM

## 2017-11-10 DIAGNOSIS — F41.9 ANXIETY: ICD-10-CM

## 2017-11-10 DIAGNOSIS — K58.8 OTHER IRRITABLE BOWEL SYNDROME: Primary | ICD-10-CM

## 2017-11-10 RX ORDER — LORAZEPAM 0.5 MG/1
TABLET ORAL
Qty: 30 TAB | Refills: 2 | Status: SHIPPED | OUTPATIENT
Start: 2017-11-10 | End: 2017-11-22 | Stop reason: SDUPTHER

## 2017-11-10 NOTE — PATIENT INSTRUCTIONS

## 2017-11-10 NOTE — PROGRESS NOTES
HISTORY OF PRESENT ILLNESS  Arline Meigs is a 80 y.o. female. HPI Comments: 81 yo female here for f/u of anxiety, IBS. Notes recent increase in low back pain. Worse when she walks such as shopping. Better when she sits. Has OP, no falls. IBS: continued issues with constipation, bloating. Taking gas-x which is not much help  She has been taking lorazepam every other day. Notes back pain is better on the days she does. Sleep is better as well. Mood stable. Stays active. Review of Systems   Constitutional: Negative for chills, fever and weight loss. HENT: Negative for congestion and ear pain. Eyes: Negative for blurred vision and pain. Respiratory: Negative for cough and shortness of breath. Cardiovascular: Negative for chest pain, palpitations and leg swelling. Gastrointestinal: Positive for abdominal pain (bloating). Negative for nausea and vomiting. Genitourinary: Negative for frequency and urgency. Musculoskeletal: Positive for back pain. Negative for myalgias. Skin: Negative for itching and rash. Neurological: Negative for dizziness, tingling and headaches. Psychiatric/Behavioral: Negative for depression. The patient is not nervous/anxious. Past Medical History:   Diagnosis Date    Cancer [facility]) 8/2001    skin ca; chest     Degenerative arthritis of lumbar spine 9/26/2011    Depression 9/26/2011    Glaucoma     Hypertension     Menopause     Osteoporosis     Scoliosis     Vitamin D deficiency      Current Outpatient Prescriptions on File Prior to Visit   Medication Sig Dispense Refill    hydrocortisone (ANUCORT-HC) 25 mg supp Insert 1 Suppository into rectum two (2) times a day. 180 Each 0    simethicone (GAS-X) 80 mg chewable tablet Take 80 mg by mouth every six (6) hours as needed for Flatulence.  escitalopram oxalate (LEXAPRO) 10 mg tablet Take 1 Tab by mouth daily.  90 Tab 5    fluticasone (FLONASE) 50 mcg/actuation nasal spray 2 Sprays by Both Nostrils route daily. 1 Bottle 3    lansoprazole (PREVACID) 15 mg capsule 1 bid 180 Cap 5    LORazepam (ATIVAN) 0.5 mg tablet I tab po q day if needed for anxiety 30 Tab 2    brimonidine (ALPHAGAN) 0.15 % ophthalmic solution Administer 1 Drop to both eyes two (2) times a day.  aspirin delayed-release 81 mg tablet Take 1 Tab by mouth daily. 30 Tab 6     No current facility-administered medications on file prior to visit. Social History   Substance Use Topics    Smoking status: Never Smoker    Smokeless tobacco: Never Used    Alcohol use No     Physical Exam   Constitutional: She appears well-developed and well-nourished. No distress. /57 (BP 1 Location: Left arm, BP Patient Position: Sitting)  Pulse 76  Temp 96.5 °F (35.8 °C)  Resp 16  Ht 4' 8\" (1.422 m)  Wt 98 lb (44.5 kg)  SpO2 98%  BMI 21.97 kg/m2     Eyes: EOM are normal. Right eye exhibits no discharge. Left eye exhibits no discharge. No scleral icterus. Neck: Neck supple. Cardiovascular: Normal rate, regular rhythm and normal heart sounds. Exam reveals no gallop and no friction rub. No murmur heard. Pulmonary/Chest: Effort normal and breath sounds normal. No respiratory distress. She has no wheezes. She has no rales. Abdominal: Soft. She exhibits no distension. There is no tenderness. There is no rebound and no guarding. Musculoskeletal: She exhibits no edema or tenderness. Lumbar back: She exhibits no tenderness and no bony tenderness. Lymphadenopathy:     She has no cervical adenopathy. Neurological: She is alert. She exhibits normal muscle tone. Skin: Skin is warm and dry. Psychiatric: She has a normal mood and affect.      No results found for: HBA1C, HGBE8, VZO0FVXP, VZN1ECDT  Lab Results   Component Value Date/Time    Sodium 140 08/10/2017 02:04 PM    Potassium 3.9 08/10/2017 02:04 PM    Chloride 103 08/10/2017 02:04 PM    CO2 29 08/10/2017 02:04 PM    Anion gap 8 08/10/2017 02:04 PM    Glucose 93 08/10/2017 02:04 PM    BUN 19 08/10/2017 02:04 PM    Creatinine 0.64 08/10/2017 02:04 PM    BUN/Creatinine ratio 30 08/10/2017 02:04 PM    GFR est AA >60 08/10/2017 02:04 PM    GFR est non-AA >60 08/10/2017 02:04 PM    Calcium 9.6 08/10/2017 02:04 PM    Bilirubin, total 0.2 08/10/2017 02:04 PM    AST (SGOT) 19 08/10/2017 02:04 PM    Alk. phosphatase 94 08/10/2017 02:04 PM    Protein, total 7.0 08/10/2017 02:04 PM    Albumin 3.6 08/10/2017 02:04 PM    Globulin 3.4 08/10/2017 02:04 PM    A-G Ratio 1.1 08/10/2017 02:04 PM    ALT (SGPT) 23 08/10/2017 02:04 PM     Lab Results   Component Value Date/Time    Cholesterol, total 188 08/10/2017 02:04 PM    HDL Cholesterol 73 08/10/2017 02:04 PM    LDL, calculated 99.2 08/10/2017 02:04 PM    VLDL, calculated 15.8 08/10/2017 02:04 PM    Triglyceride 79 08/10/2017 02:04 PM    CHOL/HDL Ratio 2.6 08/10/2017 02:04 PM     ASSESSMENT and PLAN  Diagnoses and all orders for this visit:    1. 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 Anxiety    Other orders  -     LORazepam (ATIVAN) 0.5 mg tablet; I tab po q day if needed for anxiety  -     linaclotide (LINZESS) 72 mcg cap; Take 1 Cap by mouth daily. Indications: Constipation Predominant Irritable Bowel Syndrome    Will try adding Linzess for IBS sx. Can take lorazepam daily if tolerated.    Referral entered for PT.

## 2017-11-10 NOTE — PROGRESS NOTES
Sadie Richardson presents today for   Chief Complaint   Patient presents with    Agitation    Abdominal Pain     IBS       Sadie Richardson preferred language for health care discussion is english/other. Is someone accompanying this pt? No    Is the patient using any DME equipment during OV? No    Depression Screening:  PHQ over the last two weeks 11/10/2017 8/10/2017 5/9/2017 1/30/2017 10/27/2016 6/9/2016 4/28/2016   Little interest or pleasure in doing things Several days Not at all Not at all Not at all Not at all Not at all Not at all   Feeling down, depressed or hopeless Several days Not at all Not at all Not at all Not at all Not at all Not at all   Total Score PHQ 2 2 0 0 0 0 0 0       Learning Assessment:  Learning Assessment 4/9/2015 12/18/2013 9/18/2013   PRIMARY LEARNER Patient Patient Patient   HIGHEST LEVEL OF EDUCATION - PRIMARY LEARNER  GRADUATED HIGH SCHOOL OR GED GRADUATED HIGH SCHOOL OR GED -   BARRIERS PRIMARY LEARNER NONE NONE -   PRIMARY LANGUAGE ENGLISH ENGLISH ENGLISH    NEED - No -   LEARNER PREFERENCE PRIMARY DEMONSTRATION DEMONSTRATION READING   LEARNING SPECIAL TOPICS - no -   ANSWERED BY patient patient patient   RELATIONSHIP SELF SELF SELF       Abuse Screening:  Abuse Screening Questionnaire 5/9/2017 1/28/2016 4/9/2015   Do you ever feel afraid of your partner? N N N   Are you in a relationship with someone who physically or mentally threatens you? N N N   Is it safe for you to go home? Leanne Lloyd       Fall Risk  Fall Risk Assessment, last 12 mths 11/10/2017 8/10/2017 5/9/2017 1/30/2017 10/27/2016 6/9/2016 4/28/2016   Able to walk? Yes Yes Yes Yes Yes Yes Yes   Fall in past 12 months? No No No Yes Yes No No   Fall with injury? - - - Yes No - -   Number of falls in past 12 months - - - 1 1 - -   Fall Risk Score - - - 2 1 - -       Health Maintenance reviewed and discussed per provider. Yes    Sadie Richardson is due for eye. Pt stated she is seeing Dr Marcos Leon next week. Please order/place referral if appropriate. Advance Directive:  1. Do you have an advance directive in place? Patient Reply:  No    2. If not, would you like material regarding how to put one in place? Patient Reply: No    Coordination of Care:  1. Have you been to the ER, urgent care clinic since your last visit? Hospitalized since your last visit? No    2. Have you seen or consulted any other health care providers outside of the [address] [place] [address] since your last visit?  Yes; Dr Ruben Arreguin

## 2017-11-10 NOTE — MR AVS SNAPSHOT
Visit Information Date & Time Provider Department Dept. Phone Encounter #  
 11/10/2017  1:00 PM [employer] [employer] [facility] [phone] [id number] Follow-up Instructions Return in about 3 months (around 2/10/2018), or if symptoms worsen or fail to improve. Upcoming Health Maintenance Date Due  
 GLAUCOMA SCREENING Q2Y 10/21/2017 MEDICARE YEARLY EXAM 5/10/2018 DTaP/Tdap/Td series (2 - Td) 8/10/2026 Allergies as of 11/10/2017  Review Complete On: 11/10/2017 By: Nika De Jesus LPN Severity Noted Reaction Type Reactions Flexeril [Cyclobenzaprine]  09/26/2011   Not Verified Unknown (comments)  
 unknown Pcn [Penicillins]  09/26/2011   Topical Rash Current Immunizations  Reviewed on 10/23/2012 Name Date Influenza High Dose Vaccine PF 10/27/2016, 9/30/2015 Influenza Vaccine Split 10/23/2012, 10/25/2011 Pneumococcal Conjugate (PCV-13) 10/28/2015 Tdap 8/10/2016 Not reviewed this visit You Were Diagnosed With   
  
 Codes Comments Other irritable bowel syndrome    -  Primary ICD-10-CM: K58.8 ICD-9-CM: 469.4 Spondylosis of lumbar region without myelopathy or radiculopathy     ICD-10-CM: M47.816 ICD-9-CM: 721.3 Anxiety     ICD-10-CM: F41.9 ICD-9-CM: 300.00 Vitals BP Pulse Temp Resp Height(growth percentile) Weight(growth percentile) 140/57 (BP 1 Location: Left arm, BP Patient Position: Sitting) 76 96.5 °F (35.8 °C) 16 4' 8\" (1.422 m) 98 lb (44.5 kg) SpO2 BMI OB Status Smoking Status 98% 21.97 kg/m2 Postmenopausal Never Smoker Vitals History BMI and BSA Data Body Mass Index Body Surface Area  
 21.97 kg/m 2 1.33 m 2 Preferred Pharmacy Pharmacy Name Phone [facility]  [facility] [phone] Your Updated Medication List  
  
   
This list is accurate as of: 11/10/17  1:32 PM.  Always use your most recent med list.  
  
  
  
  
 aspirin delayed-release 81 mg tablet Take 1 Tab by mouth daily. brimonidine 0.15 % ophthalmic solution Commonly known as:  Donte Wood Administer 1 Drop to both eyes two (2) times a day. escitalopram oxalate 10 mg tablet Commonly known as:  Chelita Boone Take 1 Tab by mouth daily. fluticasone 50 mcg/actuation nasal spray Commonly known as:  Lella Solum 2 Sprays by Both Nostrils route daily. GAS-X 80 mg chewable tablet Generic drug:  simethicone Take 80 mg by mouth every six (6) hours as needed for Flatulence. hydrocortisone 25 mg Supp Commonly known as:  ANUCORT-HC Insert 1 Suppository into rectum two (2) times a day. lansoprazole 15 mg capsule Commonly known as:  PREVACID  
1 bid  
  
 linaclotide 72 mcg Cap Commonly known as:  Karen White Take 1 Cap by mouth daily. Indications: Constipation Predominant Irritable Bowel Syndrome LORazepam 0.5 mg tablet Commonly known as:  ATIVAN I tab po q day if needed for anxiety Prescriptions Printed Refills LORazepam (ATIVAN) 0.5 mg tablet 2 Sig: I tab po q day if needed for anxiety Class: Print Prescriptions Sent to Pharmacy Refills  
 linaclotide (LINZESS) 72 mcg cap 5 Sig: Take 1 Cap by mouth daily. Indications: Constipation Predominant Irritable Bowel Syndrome Class: Normal  
 Pharmacy: [facility] [address] [facility] #: [phone] Route: Oral  
  
We Performed the Following REFERRAL TO PHYSICAL THERAPY [[facility] Custom] Comments:  
 Please evaluate patient for DDD, OP with increased low back pain. Follow-up Instructions Return in about 3 months (around 2/10/2018), or if symptoms worsen or fail to improve. Referral Information Referral ID Referred By Referred To  
  
 [id number] [facility]   
   [address] Phone: [phone] Visits Status Start Date End Date 1 New Request 11/10/17 11/10/18 If your referral has a status of pending review or denied, additional information will be sent to support the outcome of this decision. Patient Instructions Low Back Arthritis: Exercises Your Care Instructions Here are some examples of typical rehabilitation exercises for your condition. Start each exercise slowly. Ease off the exercise if you start to have pain. Your doctor or physical therapist will tell you when you can start these exercises and which ones will work best for you. When you are not being active, find a comfortable position for rest. Some people are comfortable on the floor or a medium-firm bed with a small pillow under their head and another under their knees. Some people prefer to lie on their side with a pillow between their knees. Don't stay in one position for too long. Take short walks (10 to 20 minutes) every 2 to 3 hours. Avoid slopes, hills, and stairs until you feel better. Walk only distances you can manage without pain, especially leg pain. How to do the exercises Pelvic tilt 1. Lie on your back with your knees bent. 2. \"Brace\" your stomach-tighten your muscles by pulling in and imagining your belly button moving toward your spine. 3. Press your lower back into the floor. You should feel your hips and pelvis rock back. 4. Hold for 6 seconds while breathing smoothly. 5. Relax and allow your pelvis and hips to rock forward. 6. Repeat 8 to 12 times. Back stretches 1. Get down on your hands and knees on the floor. 2. Relax your head and allow it to droop. Round your back up toward the ceiling until you feel a nice stretch in your upper, middle, and lower back. Hold this stretch for as long as it feels comfortable, or about 15 to 30 seconds. 3. Return to the starting position with a flat back while you are on your hands and knees. 4. Let your back sway by pressing your stomach toward the floor. Lift your buttocks toward the ceiling. 5. Hold this position for 15 to 30 seconds. 6. Repeat 2 to 4 times. Follow-up care is a key part of your treatment and safety. Be sure to make and go to all appointments, and call your doctor if you are having problems. It's also a good idea to know your test results and keep a list of the medicines you take. Where can you learn more? Go to http://tootie-mery.info/. Enter B733 in the search box to learn more about \"Low Back Arthritis: Exercises. \" Current as of: March 21, 2017 Content Version: 11.4 © 3546-4095 [employer]. Care instructions adapted under license by [employer] (which disclaims liability or warranty for this information). If you have questions about a medical condition or this instruction, always ask your healthcare professional. [address] any warranty or liability for your use of this information. Introducing [facility]! Soni Porter introduces [employer] patient portal. Now you can access parts of your medical record, email your doctor's office, and request medication refills online. 1. In your internet browser, go to [email or web address] "[email or web address]/[email or web address] 2. Click on the First Time User? Click Here link in the Sign In box. You will see the New Member Sign Up page. 3. Enter your [employer] Access Code exactly as it appears below. You will not need to use this code after youve completed the sign-up process. If you do not sign up before the expiration date, you must request a new code. · [employer] Access Code: [email or web address] Expires: 2/8/2018  1:32 PM 
 
4. Enter the last four digits of your Social Security Number (xxxx) and Date of Birth (mm/dd/yyyy) as indicated and click Submit. You will be taken to the next sign-up page. 5. Create a [employer] ID.  This will be your [employer] login ID and cannot be changed, so think of one that is secure and easy to remember. 6. Create a [employer] password. You can change your password at any time. 7. Enter your Password Reset Question and Answer. This can be used at a later time if you forget your password. 8. Enter your e-mail address. You will receive e-mail notification when new information is available in [address] 9. Click Sign Up. You can now view and download portions of your medical record. 10. Click the Download Summary menu link to download a portable copy of your medical information. If you have questions, please visit the Frequently Asked Questions section of the [employer] website. Remember, [employer] is NOT to be used for urgent needs. For medical emergencies, dial 911. Now available from your iPhone and Android! Please provide this summary of care documentation to your next provider. Your primary care clinician is listed as Cydney Gutierrez. If you have any questions after today's visit, please call [phone].

## 2017-11-13 NOTE — TELEPHONE ENCOUNTER
Requested Prescriptions     Pending Prescriptions Disp Refills    linaclotide (LINZESS) 72 mcg cap 30 Cap 5     Sig: Take 1 Cap by mouth daily.  Indications: Constipation Predominant Irritable Bowel Syndrome

## 2017-11-20 NOTE — TELEPHONE ENCOUNTER
Patient would like to speak with someone in reference to her [address] prescriptions. States she is supposed to get 90 day prescription and only received 30 day prescriptions this time which cost her $90.  Needs this to be corrected, and also want to speak with someone. States she called last week and was told it would be taken care of.

## 2017-11-22 RX ORDER — LORAZEPAM 0.5 MG/1
TABLET ORAL
Qty: 90 TAB | Refills: 1 | Status: SHIPPED | OUTPATIENT
Start: 2017-11-22 | End: 2018-05-14 | Stop reason: SDUPTHER

## 2017-11-22 NOTE — TELEPHONE ENCOUNTER
Called spoke with patient verified with two identifiers advised that I have called her mail order pharmacy and advised they (patient's Linzess and Ativan), should have been a 90 day supply they needed a new order so we have sent in the new RX's to them. Patient verbalized understanding.

## 2017-11-22 NOTE — TELEPHONE ENCOUNTER
Called spoke with christopher at [facility] mail order about patient's Linzess and Ativan she stated that it is 90.00 for 30 days and if we did the 90 day supply it is 90.00 and the ativan can be done and cheaper if we do a 90 day. We cancelled out the 30 day orders because if we send in a new RX it doesn't cancel out the old order so there would be two orders in the system. Advised that I would fax the Ativan in when it is ran and signed Jesenia Ferguson stated that is okay gave me the fax number of [phone]. Once this is approved I will call patient and let her know it has been taken care of.

## 2017-12-01 ENCOUNTER — OFFICE VISIT (OUTPATIENT)
Dept: INTERNAL MEDICINE CLINIC | Age: 82
End: 2017-12-01

## 2017-12-01 VITALS
HEART RATE: 90 BPM | BODY MASS INDEX: 22.04 KG/M2 | HEIGHT: 56 IN | DIASTOLIC BLOOD PRESSURE: 80 MMHG | SYSTOLIC BLOOD PRESSURE: 140 MMHG | RESPIRATION RATE: 15 BRPM | WEIGHT: 98 LBS | TEMPERATURE: 96.8 F

## 2017-12-01 DIAGNOSIS — J06.9 VIRAL URI: Primary | ICD-10-CM

## 2017-12-01 NOTE — PROGRESS NOTES
[address] Jessie Danielson presents today for   Chief Complaint   Patient presents with    Laryngitis       Gary Arellano preferred language for health care discussion is english/other. Is someone accompanying this pt? No    Is the patient using any DME equipment during OV? No    Depression Screening:  PHQ over the last two weeks 12/1/2017 11/10/2017 8/10/2017 5/9/2017 1/30/2017 10/27/2016 6/9/2016   Little interest or pleasure in doing things Not at all Several days Not at all Not at all Not at all Not at all Not at all   Feeling down, depressed or hopeless Not at all Several days Not at all Not at all Not at all Not at all Not at all   Total Score PHQ 2 0 2 0 0 0 0 0       Learning Assessment:  Learning Assessment 4/9/2015 12/18/2013 9/18/2013   PRIMARY LEARNER Patient Patient Patient   HIGHEST LEVEL OF EDUCATION - PRIMARY LEARNER  GRADUATED HIGH SCHOOL OR GED GRADUATED HIGH SCHOOL OR GED -   BARRIERS PRIMARY LEARNER NONE NONE -   PRIMARY LANGUAGE ENGLISH ENGLISH ENGLISH    NEED - No -   LEARNER PREFERENCE PRIMARY DEMONSTRATION DEMONSTRATION READING   LEARNING SPECIAL TOPICS - no -   ANSWERED BY patient patient patient   RELATIONSHIP SELF SELF SELF       Abuse Screening:  Abuse Screening Questionnaire 5/9/2017 1/28/2016 4/9/2015   Do you ever feel afraid of your partner? N N N   Are you in a relationship with someone who physically or mentally threatens you? N N N   Is it safe for you to go home? Massimo Brandon       Fall Risk  Fall Risk Assessment, last 12 mths 12/1/2017 11/10/2017 8/10/2017 5/9/2017 1/30/2017 10/27/2016 6/9/2016   Able to walk? Yes Yes Yes Yes Yes Yes Yes   Fall in past 12 months? No No No No Yes Yes No   Fall with injury? - - - - Yes No -   Number of falls in past 12 months - - - - 1 1 -   Fall Risk Score - - - - 2 1 -       Health Maintenance reviewed and discussed per provider. Yes    Gary Arellano is due for Glaucoma screening.   Please order/place referral if appropria    Advance Directive:  1. Do you have an advance directive in place? Patient Reply: No    2. If not, would you like material regarding how to put one in place? Patient Reply: No, patient says she has the paperwork at home    2. Per patient no changes to their ACP contact No.      Coordination of Care:  1. Have you been to the ER, urgent care clinic since your last visit? Hospitalized since your last visit? No    2. Have you seen or consulted any other health care providers outside of the [address] [place] [address] since your last visit? Include any pap smears or colon screening.  No

## 2017-12-01 NOTE — ACP (ADVANCE CARE PLANNING)
Advance Directive:  1. Do you have an advance directive in place? Patient Reply: No    2. If not, would you like material regarding how to put one in place? Patient Reply: No, patient says she has the paperwork at home    2.   Per patient no changes to their ACP contact No.

## 2017-12-01 NOTE — PATIENT INSTRUCTIONS
Viral Respiratory Infection: Care Instructions  Your Care Instructions    Viruses are very small organisms. They grow in number after they enter your body. There are many types that cause different illnesses, such as colds and the mumps. The symptoms of a viral respiratory infection often start quickly. They include a fever, sore throat, and runny nose. You may also just not feel well. Or you may not want to eat much. Most viral respiratory infections are not serious. They usually get better with time and self-care. Antibiotics are not used to treat a viral infection. That's because antibiotics will not help cure a viral illness. In some cases, antiviral medicine can help your body fight a serious viral infection. Follow-up care is a key part of your treatment and safety. Be sure to make and go to all appointments, and call your doctor if you are having problems. It's also a good idea to know your test results and keep a list of the medicines you take. How can you care for yourself at home? · Rest as much as possible until you feel better. · Be safe with medicines. Take your medicine exactly as prescribed. Call your doctor if you think you are having a problem with your medicine. You will get more details on the specific medicine your doctor prescribes. · Take an over-the-counter pain medicine, such as acetaminophen (Tylenol), ibuprofen (Advil, Motrin), or naproxen (Aleve), as needed for pain and fever. Read and follow all instructions on the label. Do not give aspirin to anyone younger than 20. It has been linked to Reye syndrome, a serious illness. · Drink plenty of fluids, enough so that your urine is light yellow or clear like water. Hot fluids, such as tea or soup, may help relieve congestion in your nose and throat. If you have kidney, heart, or liver disease and have to limit fluids, talk with your doctor before you increase the amount of fluids you drink.   · Try to clear mucus from your lungs by breathing deeply and coughing. · Gargle with warm salt water once an hour. This can help reduce swelling and throat pain. Use 1 teaspoon of salt mixed in 1 cup of warm water. · Do not smoke or allow others to smoke around you. If you need help quitting, talk to your doctor about stop-smoking programs and medicines. These can increase your chances of quitting for good. To avoid spreading the virus  · Cough or sneeze into a tissue. Then throw the tissue away. · If you don't have a tissue, use your hand to cover your cough or sneeze. Then clean your hand. You can also cough into your sleeve. · Wash your hands often. Use soap and warm water. Wash for 15 to 20 seconds each time. · If you don't have soap and water near you, you can clean your hands with alcohol wipes or gel. When should you call for help? Call your doctor now or seek immediate medical care if:  ? · You have a new or higher fever. ? · Your fever lasts more than 48 hours. ? · You have trouble breathing. ? · You have a fever with a stiff neck or a severe headache. ? · You are sensitive to light. ? · You feel very sleepy or confused. ? Watch closely for changes in your health, and be sure to contact your doctor if:  ? · You do not get better as expected. Where can you learn more? Go to http://tootie-mery.info/. Enter Y098 in the search box to learn more about \"Viral Respiratory Infection: Care Instructions. \"  Current as of: May 12, 2017  Content Version: 11.4  © 2694-1404 [employer] ([employer]: [place]). Care instructions adapted under license by [employer] (which disclaims liability or warranty for this information). If you have questions about a medical condition or this instruction, always ask your healthcare professional. [place] [address] any warranty or liability for your use of this information.        Saline Nasal Washes: Care Instructions  Your Care Instructions  Saline nasal washes help keep the nasal passages open by washing out thick or dried mucus. This simple remedy can help relieve symptoms of allergies, sinusitis, and colds. It also can make the nose feel more comfortable by keeping the mucous membranes moist. You may notice a little burning sensation in your nose the first few times you use the solution, but this usually gets better in a few days. Follow-up care is a key part of your treatment and safety. Be sure to make and go to all appointments, and call your doctor if you are having problems. It's also a good idea to know your test results and keep a list of the medicines you take. How can you care for yourself at home? · You can buy premixed saline solution in a squeeze bottle or other sinus rinse products at a drugstore. Read and follow the instructions on the label. · You also can make your own saline solution by adding 1 teaspoon of salt and 1 teaspoon of baking soda to 2 cups of distilled water. · If you use a homemade solution, pour a small amount into a clean bowl. Using a rubber bulb syringe, squeeze the syringe and place the tip in the salt water. Pull a small amount of the salt water into the syringe by relaxing your hand. · Sit down with your head tilted slightly back. Do not lie down. Put the tip of the bulb syringe or the squeeze bottle a little way into one of your nostrils. Gently drip or squirt a few drops into the nostril. Repeat with the other nostril. Some sneezing and gagging are normal at first.  · Gently blow your nose. · Wipe the syringe or bottle tip clean after each use. · Repeat this 2 or 3 times a day. · Use nasal washes gently if you have nosebleeds often. When should you call for help? Watch closely for changes in your health, and be sure to contact your doctor if:  ? · You often get nosebleeds. ? · You have problems doing the nasal washes. Where can you learn more? Go to http://tootie-mery.info/.   Enter [id number] in the search box to learn more about \"Saline Nasal Washes: Care Instructions. \"  Current as of: May 12, 2017  Content Version: 11.4  © 1965-0059 [employer]. Care instructions adapted under license by [employer] (which disclaims liability or warranty for this information). If you have questions about a medical condition or this instruction, always ask your healthcare professional. [place] [address] any warranty or liability for your use of this information.

## 2017-12-01 NOTE — PROGRESS NOTES
HISTORY OF PRESENT ILLNESS  Sarah Durham is a 80 y.o. female. HPI Comments: 81 yo female here for evaluation of URI sx x 5 days. + sick contacts over holiday. Denies fevers. Has had sore throat which has improved but losing her voice today. + cough. Not productive at this time. Has taken prn Tylenol but no other OTC products. BP a little elevated on arrival but improved on recheck. Has been stable when monitored at home. Laryngitis   Pertinent negatives include no chest pain, no headaches and no shortness of breath. Review of Systems   Constitutional: Negative for chills, fever and weight loss. HENT: Positive for congestion and sore throat. Negative for ear pain and sinus pain. Eyes: Negative for blurred vision and pain. Respiratory: Negative for cough and shortness of breath. Cardiovascular: Negative for chest pain, palpitations and leg swelling. Gastrointestinal: Negative for nausea and vomiting. Genitourinary: Negative for frequency and urgency. Musculoskeletal: Negative for joint pain and myalgias. Skin: Negative for itching and rash. Neurological: Negative for dizziness, tingling and headaches. Psychiatric/Behavioral: Negative for depression. The patient is not nervous/anxious. 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 Indications: Constipation Predominant Irritable Bowel Syndrome 90 Cap 3    LORazepam (ATIVAN) 0.5 mg tablet I tab po q day if needed for anxiety 90 Tab 1    aspirin delayed-release 81 mg tablet Take 1 Tab by mouth daily.  30 Tab 6    hydrocortisone (ANUCORT-HC) 25 mg supp Insert 1 Suppository into rectum two (2) times a day. 180 Each 0    simethicone (GAS-X) 80 mg chewable tablet Take 80 mg by mouth every six (6) hours as needed for Flatulence.  escitalopram oxalate (LEXAPRO) 10 mg tablet Take 1 Tab by mouth daily. 90 Tab 5    fluticasone (FLONASE) 50 mcg/actuation nasal spray 2 Sprays by Both Nostrils route daily. 1 Bottle 3    brimonidine (ALPHAGAN) 0.15 % ophthalmic solution Administer 1 Drop to both eyes two (2) times a day.  lansoprazole (PREVACID) 15 mg capsule 1 bid 180 Cap 5     No current facility-administered medications on file prior to visit. Social History   Substance Use Topics    Smoking status: Never Smoker    Smokeless tobacco: Never Used    Alcohol use No     Physical Exam   Constitutional: She appears well-developed and well-nourished. No distress. /80 (BP 1 Location: Right arm, BP Patient Position: Sitting)  Pulse 90  Temp 96.8 °F (36 °C) (Oral)   Resp 15  Ht 4' 8\" (1.422 m)  Wt 98 lb (44.5 kg)  BMI 21.97 kg/m2     HENT:   Right Ear: Tympanic membrane and ear canal normal.   Left Ear: Tympanic membrane and ear canal normal.   Nose: Right sinus exhibits no maxillary sinus tenderness and no frontal sinus tenderness. Left sinus exhibits no maxillary sinus tenderness and no frontal sinus tenderness. Mouth/Throat: Posterior oropharyngeal erythema present. No oropharyngeal exudate. Eyes: EOM are normal. Right eye exhibits no discharge. Left eye exhibits no discharge. No scleral icterus. Neck: Neck supple. Cardiovascular: Normal rate, regular rhythm and normal heart sounds. Exam reveals no gallop and no friction rub. No murmur heard. Pulmonary/Chest: Effort normal and breath sounds normal. No respiratory distress. She has no wheezes. She has no rales. Musculoskeletal: She exhibits no edema or tenderness. Lymphadenopathy:     She has no cervical adenopathy. Neurological: She is alert. She exhibits normal muscle tone. Skin: Skin is warm and dry. Psychiatric: She has a normal mood and affect. Lab Results   Component Value Date/Time    Sodium 140 08/10/2017 02:04 PM    Potassium 3.9 08/10/2017 02:04 PM    Chloride 103 08/10/2017 02:04 PM    CO2 29 08/10/2017 02:04 PM    Anion gap 8 08/10/2017 02:04 PM    Glucose 93 08/10/2017 02:04 PM    BUN 19 08/10/2017 02:04 PM    Creatinine 0.64 08/10/2017 02:04 PM    BUN/Creatinine ratio 30 08/10/2017 02:04 PM    GFR est AA >60 08/10/2017 02:04 PM    GFR est non-AA >60 08/10/2017 02:04 PM    Calcium 9.6 08/10/2017 02:04 PM    Bilirubin, total 0.2 08/10/2017 02:04 PM    AST (SGOT) 19 08/10/2017 02:04 PM    Alk. phosphatase 94 08/10/2017 02:04 PM    Protein, total 7.0 08/10/2017 02:04 PM    Albumin 3.6 08/10/2017 02:04 PM    Globulin 3.4 08/10/2017 02:04 PM    A-G Ratio 1.1 08/10/2017 02:04 PM    ALT (SGPT) 23 08/10/2017 02:04 PM     ASSESSMENT and PLAN    ICD-10-CM ICD-9-CM    1. Viral URI J06.9 465.9     B97.89       Continue with symptomatic tx. Discussed trying saline NS, OTC cold product prn. RTC if sx persist or worsen.

## 2017-12-01 NOTE — MR AVS SNAPSHOT
Visit Information Date & Time Provider Department Dept. Phone Encounter #  
 12/1/2017 11:00 AM [address] [phone] [id number] Follow-up Instructions Return if symptoms worsen or fail to improve. Your Appointments 2/12/2018  1:45 PM  
Office Visit with MD TYSON [facility]) Appt Note: 3 month f/u  
 [address]  
  
   
 [address] [facility] Upcoming Health Maintenance Date Due  
 GLAUCOMA SCREENING Q2Y 10/21/2017 MEDICARE YEARLY EXAM 5/10/2018 DTaP/Tdap/Td series (2 - Td) 8/10/2026 Allergies as of 12/1/2017  Review Complete On: 12/1/2017 By: Meagan Moreno LPN Severity Noted Reaction Type Reactions Flexeril [Cyclobenzaprine]  09/26/2011   Not Verified Unknown (comments)  
 unknown Pcn [Penicillins]  09/26/2011   Topical Rash Current Immunizations  Reviewed on 10/23/2012 Name Date Influenza High Dose Vaccine PF 10/27/2016, 9/30/2015 Influenza Vaccine Split 10/23/2012, 10/25/2011 Pneumococcal Conjugate (PCV-13) 10/28/2015 Tdap 8/10/2016 Not reviewed this visit You Were Diagnosed With   
  
 Codes Comments Viral URI    -  Primary ICD-10-CM: J06.9, B97.89 ICD-9-CM: 465.9 Vitals BP Pulse Temp Resp Height(growth percentile) Weight(growth percentile) 140/80 (BP 1 Location: Right arm, BP Patient Position: Sitting) 90 96.8 °F (36 °C) (Oral) 15 4' 8\" (1.422 m) 98 lb (44.5 kg) BMI OB Status Smoking Status 21.97 kg/m2 Postmenopausal Never Smoker Vitals History BMI and BSA Data Body Mass Index Body Surface Area  
 21.97 kg/m 2 1.33 m 2 Preferred Pharmacy Pharmacy Name Phone  [facility] [address] [phone] Your Updated Medication List  
  
   
 This list is accurate as of: 12/1/17 11:25 AM.  Always use your most recent med list.  
  
  
  
  
 aspirin delayed-release 81 mg tablet Take 1 Tab by mouth daily. brimonidine 0.15 % ophthalmic solution Commonly known as:  Shasta Speaker Administer 1 Drop to both eyes two (2) times a day. escitalopram oxalate 10 mg tablet Commonly known as:  Arty [employer] [place] Take 1 Tab by mouth daily. fluticasone 50 mcg/actuation nasal spray Commonly known as:  Rubi Picacho 2 Sprays by Both Nostrils route daily. GAS-X 80 mg chewable tablet Generic drug:  simethicone Take 80 mg by mouth every six (6) hours as needed for Flatulence. hydrocortisone 25 mg Supp Commonly known as:  ANUCORT-HC Insert 1 Suppository into rectum two (2) times a day. lansoprazole 15 mg capsule Commonly known as:  PREVACID  
1 bid  
  
 linaclotide 72 mcg Cap Commonly known as:  Amalia Northern Take 1 Cap by mouth daily. Indications: Constipation Predominant Irritable Bowel Syndrome LORazepam 0.5 mg tablet Commonly known as:  ATIVAN I tab po q day if needed for anxiety Follow-up Instructions Return if symptoms worsen or fail to improve. To-Do List   
 12/05/2017 11:00 AM  
  Appointment with Rena Turk, PT at [facility] PT [address] ([phone]) Patient Instructions Viral Respiratory Infection: Care Instructions Your Care Instructions Viruses are very small organisms. They grow in number after they enter your body. There are many types that cause different illnesses, such as colds and the mumps. The symptoms of a viral respiratory infection often start quickly. They include a fever, sore throat, and runny nose. You may also just not feel well. Or you may not want to eat much. Most viral respiratory infections are not serious. They usually get better with time and self-care. Antibiotics are not used to treat a viral infection.  That's because antibiotics will not help cure a viral illness. In some cases, antiviral medicine can help your body fight a serious viral infection. Follow-up care is a key part of your treatment and safety. Be sure to make and go to all appointments, and call your doctor if you are having problems. It's also a good idea to know your test results and keep a list of the medicines you take. How can you care for yourself at home? · Rest as much as possible until you feel better. · Be safe with medicines. Take your medicine exactly as prescribed. Call your doctor if you think you are having a problem with your medicine. You will get more details on the specific medicine your doctor prescribes. · Take an over-the-counter pain medicine, such as acetaminophen (Tylenol), ibuprofen (Advil, Motrin), or naproxen (Aleve), as needed for pain and fever. Read and follow all instructions on the label. Do not give aspirin to anyone younger than 20. It has been linked to Reye syndrome, a serious illness. · Drink plenty of fluids, enough so that your urine is light yellow or clear like water. Hot fluids, such as tea or soup, may help relieve congestion in your nose and throat. If you have kidney, heart, or liver disease and have to limit fluids, talk with your doctor before you increase the amount of fluids you drink. · Try to clear mucus from your lungs by breathing deeply and coughing. · Gargle with warm salt water once an hour. This can help reduce swelling and throat pain. Use 1 teaspoon of salt mixed in 1 cup of warm water. · Do not smoke or allow others to smoke around you. If you need help quitting, talk to your doctor about stop-smoking programs and medicines. These can increase your chances of quitting for good. To avoid spreading the virus · Cough or sneeze into a tissue. Then throw the tissue away. · If you don't have a tissue, use your hand to cover your cough or sneeze. Then clean your hand. You can also cough into your sleeve. · Wash your hands often. Use soap and warm water. Wash for 15 to 20 seconds each time. · If you don't have soap and water near you, you can clean your hands with alcohol wipes or gel. When should you call for help? Call your doctor now or seek immediate medical care if: 
? · You have a new or higher fever. ? · Your fever lasts more than 48 hours. ? · You have trouble breathing. ? · You have a fever with a stiff neck or a severe headache. ? · You are sensitive to light. ? · You feel very sleepy or confused. ? Watch closely for changes in your health, and be sure to contact your doctor if: 
? · You do not get better as expected. Where can you learn more? Go to http://tootie-mery.info/. Enter O363 in the search box to learn more about \"Viral Respiratory Infection: Care Instructions. \" Current as of: May 12, 2017 Content Version: 11.4 © 0306-8564 [employer]. Care instructions adapted under license by [employer] (which disclaims liability or warranty for this information). If you have questions about a medical condition or this instruction, always ask your healthcare professional. [place] [address] any warranty or liability for your use of this information. Saline Nasal Washes: Care Instructions Your Care Instructions Saline nasal washes help keep the nasal passages open by washing out thick or dried mucus. This simple remedy can help relieve symptoms of allergies, sinusitis, and colds. It also can make the nose feel more comfortable by keeping the mucous membranes moist. You may notice a little burning sensation in your nose the first few times you use the solution, but this usually gets better in a few days. Follow-up care is a key part of your treatment and safety.  Be sure to make and go to all appointments, and call your doctor if you are having problems. It's also a good idea to know your test results and keep a list of the medicines you take. How can you care for yourself at home? · You can buy premixed saline solution in a squeeze bottle or other sinus rinse products at a drugstore. Read and follow the instructions on the label. · You also can make your own saline solution by adding 1 teaspoon of salt and 1 teaspoon of baking soda to 2 cups of distilled water. · If you use a homemade solution, pour a small amount into a clean bowl. Using a rubber bulb syringe, squeeze the syringe and place the tip in the salt water. Pull a small amount of the salt water into the syringe by relaxing your hand. · Sit down with your head tilted slightly back. Do not lie down. Put the tip of the bulb syringe or the squeeze bottle a little way into one of your nostrils. Gently drip or squirt a few drops into the nostril. Repeat with the other nostril. Some sneezing and gagging are normal at first. 
· Gently blow your nose. · Wipe the syringe or bottle tip clean after each use. · Repeat this 2 or 3 times a day. · Use nasal washes gently if you have nosebleeds often. When should you call for help? Watch closely for changes in your health, and be sure to contact your doctor if: 
? · You often get nosebleeds. ? · You have problems doing the nasal washes. Where can you learn more? Go to http://tootie-mery.info/. Enter [phone] [id number] [phone] in the search box to learn more about \"Saline Nasal Washes: Care Instructions. \" Current as of: May 12, 2017 Content Version: 11.4 © 9302-2098 [employer]. Care instructions adapted under license by [employer] (which disclaims liability or warranty for this information). If you have questions about a medical condition or this instruction, always ask your healthcare professional. [address] any warranty or liability for your use of this information. Introducing [facility]! [employer] introduces LookStat patient portal. Now you can access parts of your medical record, email your doctor's office, and request medication refills online. 1. In your internet browser, go to [email or web address]/[employer] 2. Click on the First Time User? Click Here link in the Sign In box. You will see the New Member Sign Up page. 3. Enter your LookStat Access Code exactly as it appears below. You will not need to use this code after youve completed the sign-up process. If you do not sign up before the expiration date, you must request a new code. · LookStat Access Code: [email or web address] Expires: 2/8/2018  1:32 PM 
 
4. Enter the last four digits of your Social Security Number (xxxx) and Date of Birth (mm/dd/yyyy) as indicated and click Submit. You will be taken to the next sign-up page. 5. Create a LookStat ID. This will be your LookStat login ID and cannot be changed, so think of one that is secure and easy to remember. 6. Create a LookStat password. You can change your password at any time. 7. Enter your Password Reset Question and Answer. This can be used at a later time if you forget your password. 8. Enter your e-mail address. You will receive e-mail notification when new information is available in [facility]. 9. Click Sign Up. You can now view and download portions of your medical record. 10. Click the Download Summary menu link to download a portable copy of your medical information. If you have questions, please visit the Frequently Asked Questions section of the LookStat website. Remember, LookStat is NOT to be used for urgent needs. For medical emergencies, dial 911. Now available from your iPhone and Android! Please provide this summary of care documentation to your next provider. Your primary care clinician is listed as [address] [facility] [address]. If you have any questions after today's visit, please call [phone].

## 2017-12-05 ENCOUNTER — HOSPITAL ENCOUNTER (OUTPATIENT)
Dept: PHYSICAL THERAPY | Age: 82
Discharge: HOME | End: 2017-12-05
Payer: MEDICARE

## 2017-12-05 PROCEDURE — G8979 MOBILITY GOAL STATUS: HCPCS

## 2017-12-05 PROCEDURE — 97110 THERAPEUTIC EXERCISES: CPT

## 2017-12-05 PROCEDURE — G8978 MOBILITY CURRENT STATUS: HCPCS

## 2017-12-05 PROCEDURE — 97162 PT EVAL MOD COMPLEX 30 MIN: CPT

## 2017-12-05 NOTE — PROGRESS NOTES
[facility] PHYSICAL THERAPY  [address] [place] [address] Swapnil Brock, [address] - Phone: [phone]  Fax: [phone] / [address] [facility]  Patient Name: Onesimo Ragland : 1929   Medical   Diagnosis: Lower back pain [M54.5] Treatment Diagnosis: LBP, Scoliosis   Onset Date: Chronic     Referral Source: Gladys Leon MD Start of Care [facility]): 2017   Prior Hospitalization: See medical history Provider #: [id number]   Prior Level of Function: Amb without AD, Independent with ADLs   Comorbidities: Scoliosis, OP   Medications: Verified on Patient Summary List   The Plan of Care and following information is based on the information from the initial evaluation.   ==========================================================================================  Assessment / key information:  Pt is an 80year old female who presents to PT today with c/o chronic R>L LBP then extends through her posterior hip and down her leg to the right foot. Pt report progressively increasing pain and malalignment due to OP/Scoliosis. Pt with posture showing right rib hump, right lateral curve and left iliac crest higher than right. C/C at this time is decreased amb tolerance due to LBP and LE pain. Pain lessens upon sitting. Directional preference unclear at this time as both flex and extension increase pain. Mild decreased pain report with supine right LTR. Noted poor TA contraction with decreased strength in hip flexors and abductors at 4/5.  Pt would benefit from skilled PT to address increased pain, decreased strength and ROM, and limited activity tolerance.     ==========================================================================================  Eval Complexity: History: HIGH Complexity :3+ comorbidities / personal factors will impact the outcome/ POC Exam:MEDIUM Complexity : 3 Standardized tests and measures addressing body structure, function, activity limitation and / or participation in recreation  Presentation: MEDIUM Complexity : Evolving with changing characteristics  Clinical Decision Making:MEDIUM Complexity : FOTO score of 26-74Overall Complexity:MEDIUM    Problem List: pain affecting function, decrease ROM, decrease strength, impaired gait/ balance, decrease ADL/ functional abilitiies, decrease activity tolerance, decrease flexibility/ joint mobility and decrease transfer abilities   Treatment Plan may include any combination of the following: Therapeutic exercise, Therapeutic activities, Neuromuscular re-education, Physical agent/modality, Gait/balance training, Manual therapy, Aquatic therapy, Patient education, Self Care training, Functional mobility training, Home safety training and Stair training  Patient / Family readiness to learn indicated by: asking questions, trying to perform skills and interest  Persons(s) to be included in education: patient (P)  Barriers to Learning/Limitations: None  Measures taken: FS FOTO score 46 indicating 54% functional disability   Patient Goal (s): \"Lessen pain\"   Patient self reported health status: good  Rehabilitation Potential: good   Short Term Goals: To be accomplished in  2  weeks:  1. Pt will be compliant with HEP for symptom management at home. 2. Pt will perform x10 supine bridges without pain to indicate increased core stability. 3. Pt will report >/= 50% decrease in RLE pain to indicate decreased stress on spine   Long Term Goals: To be accomplished in  4  weeks:  1. Pt will be independent with HEP at D/C for self management. 2. Pt will increase FS FOTO Score to >/= 55 to indicate a significant decrease in functional disability. 3. Pt will report >/= 75% improvement in amb tolerance for increased ease grocery shopping.    Frequency / Duration:   Patient to be seen  2  times per week for 4  weeks:  Patient / Caregiver education and instruction: self care, activity modification and exercises  G-Codes (GP): Mobility [id number] Current  CK= 40-59%   [id number] Goal  CK= 40-59%. The severity rating is based on the FOTO Score    Therapist Signature: Tao Ventura PT Date: 91/7/3403   Certification Period: 12/5/17-3/4/17 Time: 11:22 AM   ===========================================================================================  I certify that the above Physical Therapy Services are being furnished while the patient is under my care. I agree with the treatment plan and certify that this therapy is necessary. Physician Signature:        Date:       Time:     Please sign and return to [employer] or you may fax the signed copy to [phone]. Thank you.

## 2017-12-05 NOTE — PROGRESS NOTES
PHYSICAL THERAPY - DAILY TREATMENT NOTE    Patient Name: Raul Arguelles        Date: 2017  : 1929   YES Patient  Verified  Visit #:      8  Insurance: Payor: [place] MEDICARE / Plan: VA MEDICARE PART A & B / Product Type: Medicare /      In time: 11:15 Out time: 11:50   Total Treatment Time: 35     Medicare Time Tracking (below)   Total Timed Codes (min):  8 1:1 Treatment Time:  8     TREATMENT AREA =  LBP    SUBJECTIVE    Pain Level (on 0 to 10 scale):  3  / 10   Medication Changes/New allergies or changes in medical history, any new surgeries or procedures? NO    If yes, update Summary List   Subjective Functional Status/Changes:  []  No changes reported     See Eval          OBJECTIVE    8 min Therapeutic Exercise:  [x]  See flow sheet   Rationale:      increase strength and stability to improve the patients ability to amb with less pain      min Patient Education:  YES  Reviewed HEP   []  Progressed/Changed HEP based on:   Issued HEP     Other Objective/Functional Measures:    See Eval     Post Treatment Pain Level (on 0 to 10) scale:   4  / 10     ASSESSMENT    Assessment/Changes in Function:     Justification for Eval Code Complexity: moderate  Patient History (low 0, mod 1-2, high 3-4): Skin CA, OP, Scoliosis-high   Examination (low 1-2, mod 3+, high 4+): Alignment, Decreased strength and ROM   Clinical Presentation (low: stable/uncomplicated; mod: evolving; high: unstable/unpredictable): Evolving  Clinical Decision Making (low , mod 26-74, high 1-25): FOTO moderate         []  See Progress Note/Recertification   Patient will continue to benefit from skilled PT services to analyze,, cue,, progress,, modify,, demonstrate,, instruct, and address, movement patterns,, therapeutic interventions,, postural abnormalities,, soft tissue restrictions,, ROM,, strength,, functional mobility,, body mechanics/ergonomics, and home and community integration, to attain remaining goals.    Progress toward goals / Updated goals:    Goals set per POC     PLAN    []  Upgrade activities as tolerated YES Continue plan of care   []  Discharge due to :    []  Other:      Therapist: Brad Richards DPT    Date: 12/5/2017 Time: 11:22 AM   Future Appointments  Date Time Provider Alok Ortega   2/12/2018 1:45 PM Neha Hernandez MD [id number] [facility]

## 2017-12-07 ENCOUNTER — HOSPITAL ENCOUNTER (OUTPATIENT)
Dept: PHYSICAL THERAPY | Age: 82
Discharge: HOME | End: 2017-12-07
Payer: MEDICARE

## 2017-12-07 PROCEDURE — 97110 THERAPEUTIC EXERCISES: CPT

## 2017-12-07 NOTE — PROGRESS NOTES
PHYSICAL THERAPY - DAILY TREATMENT NOTE    Patient Name: Xin Sky        Date: 2017  : 1929   YES Patient  Verified  Visit #:   2   of   8  Insurance: Payor: [place] MEDICARE / Plan: VA MEDICARE PART A & B / Product Type: Medicare /      In time: 10:00 Out time: 10:50   Total Treatment Time: 40     Medicare Time Tracking (below)   Total Timed Codes (min):  40 1:1 Treatment Time:  40     TREATMENT AREA =  LBP    SUBJECTIVE    Pain Level (on 0 to 10 scale):  0  / 10   Medication Changes/New allergies or changes in medical history, any new surgeries or procedures? NO    If yes, update Summary List   Subjective Functional Status/Changes:  []  No changes reported     \"I was a little sore.  I can feel my tummy muscles sore\"          OBJECTIVE    Modalities Rationale:    decrease pain and increase tissue extensibility to improve patient's ability to decrease post exercise soreness   min [] Estim, type/location:                                      []  att     []  unatt     []  w/US     []  w/ice    []  w/heat    min []  Mechanical Traction: type/lbs                   []  pro   []  sup   []  int   []  cont    []  before manual    []  after manual    min []  Ultrasound, settings/location:      min []  Iontophoresis w/ dexamethasone, location:                                               []  take home patch-6hour remove at        []  in clinic   10 min []  Ice     [x]  Heat    location/position: Supine with wedge    min []  Vasopneumatic Device, press/temp:     min []  Other:    [] Skin assessment post-treatment (if applicable):    []  intact    []  redness- no adverse reaction     []redness - adverse reaction:      40 min Therapeutic Exercise:  [x]  See flow sheet   Rationale:      increase ROM and increase strength to improve the patients ability to perform ADLs with less pain     min Patient Education:  YES  Reviewed HEP   []  Progressed/Changed HEP based on:   Cont HEP     Other Objective/Functional Measures:  Required rep and hold count interm throughout session  Required sequence help with HL toe taps  No c/o pain with TE today   Post Treatment Pain Level (on 0 to 10) scale:   0  / 10     ASSESSMENT    Assessment/Changes in Function:     Good tolerance to session, requires frequent cueing     []  See Progress Note/Recertification   Patient will continue to benefit from skilled PT services to analyze,, cue,, progress,, modify,, demonstrate,, instruct, and address, movement patterns,, therapeutic interventions,, postural abnormalities,, soft tissue restrictions,, ROM,, strength,, functional mobility,, body mechanics/ergonomics, and home and community integration, to attain remaining goals.    Progress toward goals / Updated goals:    Reports compliance with HEP     PLAN    []  Upgrade activities as tolerated YES Continue plan of care   []  Discharge due to :    []  Other:      Therapist: Edgar Pardo DPT    Date: 12/7/2017 Time: 10:22 AM   Future Appointments  Date Time Provider Alok Ortega   12/12/2017 11:00 AM [facility]   12/14/2017 11:00 AM Martha Opitz [facility]   12/21/2017 10:30 AM [facility]   2/12/2018 1:45 PM [place] [id number] [facility]

## 2017-12-12 ENCOUNTER — HOSPITAL ENCOUNTER (OUTPATIENT)
Dept: PHYSICAL THERAPY | Age: 82
Discharge: HOME | End: 2017-12-12
Payer: MEDICARE

## 2017-12-12 PROCEDURE — 97110 THERAPEUTIC EXERCISES: CPT

## 2017-12-12 PROCEDURE — 97530 THERAPEUTIC ACTIVITIES: CPT

## 2017-12-12 NOTE — PROGRESS NOTES
PHYSICAL THERAPY - DAILY TREATMENT NOTE    Patient Name: Balaji Chen        Date: 2017  : 1929   yes Patient  Verified  Visit #:   3   of   8  Insurance: Payor: Yonas Walter / Plan: [place] MED[place] PART A & B / Product Type: Medicare /      In time: 1100 Out time: 1145   Total Treatment Time: 45     Medicare Time Tracking (below)   Total Timed Codes (min):  35 1:1 Treatment Time:  23     TREATMENT AREA =  Lower back pain [M54.5]    SUBJECTIVE  Pain Level (on 0 to 10 scale):  5  / 10   Medication Changes/New allergies or changes in medical history, any new surgeries or procedures?    no  If yes, update Summary List   Subjective Functional Status/Changes:  []  No changes reported     \"I am doing my HEP.  I am ok with it\"          OBJECTIVE  Modalities Rationale:     decrease pain to improve patient's ability to perform ADLs/ bending/stooping/ lifting/prolong sitting, stding and amb/ stairs with ease    min [] Estim, type/location:                                      []  att     []  unatt     []  w/US     []  w/ice    []  w/heat    min []  Mechanical Traction: type/lbs                   []  pro   []  sup   []  int   []  cont    []  before manual    []  after manual    min []  Ultrasound, settings/location:      min []  Iontophoresis w/ dexamethasone, location:                                               []  take home patch       []  in clinic   10 min []  Ice     [x]  Heat    location/position: Supine with wedge under B LEs      min []  Vasopneumatic Device, press/temp:     min []  Other:    [x] Skin assessment post-treatment (if applicable):    [x]  intact    []  redness- no adverse reaction     []redness - adverse reaction:        27 min Therapeutic Exercise:  [x]  See flow sheet   Rationale:      increase ROM, increase strength and improve coordination to improve the patients ability to perform ADLs/ bending/stooping/ lifting/prolong sitting, stding and amb/ stairs with ease          8 min Therapeutic Activity: postural/bed mobility training   Rationale:    increase ROM, increase strength and improve coordination to improve the patients ability to perform proper posture, bed mobility and transfers without p!       min Patient Education:  yes  Reviewed HEP   []  Progressed/Changed HEP based on:  Pt ed on importance and benefits of compliance with HEP, core strength/stability and proper posture; pt verbalized understanding       Other Objective/Functional Measures:    VCs + demo to perform proper technique for TE    Reviewed proper bed mobility, and importance and benefits of a neutral spine    pt demos SLR without ext lag     Post Treatment Pain Level (on 0 to 10) scale:   0  / 10     ASSESSMENT  Assessment/Changes in Function:   Pt executed proper core activation    Progressed there-ex without c/o increase p! []  See Progress Note/Recertification   Patient will continue to benefit from skilled PT services to modify and progress therapeutic interventions, address functional mobility deficits, address ROM deficits, address strength deficits, analyze and address soft tissue restrictions, analyze and cue movement patterns, analyze and modify body mechanics/ergonomics, assess and modify postural abnormalities and instruct in home and community integration to attain remaining goals. Progress toward goals / Updated goals: · Short Term Goals: To be accomplished in  2  weeks:  1. Pt will be compliant with HEP for symptom management at home. MET  2. Pt will perform x10 supine bridges without pain to indicate increased core stability. 3. Pt will report >/= 50% decrease in RLE pain to indicate decreased stress on spine  · Long Term Goals: To be accomplished in  4  weeks:  1. Pt will be independent with HEP at D/C for self management. 2. Pt will increase FS FOTO Score to >/= 55 to indicate a significant decrease in functional disability.    3. Pt will report >/= 75% improvement in amb tolerance for increased ease grocery shopping.       PLAN  []  Upgrade activities as tolerated yes Continue plan of care   []  Discharge due to :    []  Other:      Therapist: Ana Laura Arreola PTA    Date: 12/12/2017 Time: 1:53 PM     Future Appointments  Date Time Provider Alok Ortega   12/14/2017 11:00 AM [facility]   12/21/2017 10:30 AM [facility]   2/12/2018 1:45 PM Neha Hernandez MD [id number] [facility]

## 2017-12-14 ENCOUNTER — HOSPITAL ENCOUNTER (OUTPATIENT)
Dept: PHYSICAL THERAPY | Age: 82
Discharge: HOME | End: 2017-12-14
Payer: MEDICARE

## 2017-12-14 PROCEDURE — 97110 THERAPEUTIC EXERCISES: CPT

## 2017-12-14 PROCEDURE — 97530 THERAPEUTIC ACTIVITIES: CPT

## 2017-12-14 NOTE — PROGRESS NOTES
PHYSICAL THERAPY - DAILY TREATMENT NOTE    Patient Name: Bryanna Alexandra        Date: 2017  : 1929   yes Patient  Verified  Visit #:   4   of   8  Insurance: Payor: Aureliano Drafts / Plan: [place] MEDICARE PART A & B / Product Type: Medicare /      In time: 1100 Out time: 1153   Total Treatment Time: 53     Medicare Time Tracking (below)   Total Timed Codes (min):  43 1:1 Treatment Time:  23     TREATMENT AREA =  Lower back pain [M54.5]    SUBJECTIVE  Pain Level (on 0 to 10 scale):  5  / 10   Medication Changes/New allergies or changes in medical history, any new surgeries or procedures?    no  If yes, update Summary List   Subjective Functional Status/Changes:  []  No changes reported     \"I was not too bad after last time.  I don't know what pain I am, mod seems about right\"          OBJECTIVE  Modalities Rationale:     decrease pain to improve patient's ability to perform ADLs/ bending/stooping/ lifting/prolong sitting, stding and amb/ stairs with ease    min [] Estim, type/location:                                      []  att     []  unatt     []  w/US     []  w/ice    []  w/heat    min []  Mechanical Traction: type/lbs                   []  pro   []  sup   []  int   []  cont    []  before manual    []  after manual    min []  Ultrasound, settings/location:      min []  Iontophoresis w/ dexamethasone, location:                                               []  take home patch       []  in clinic   10 min []  Ice     [x]  Heat    location/position: Supine with wedge under B LEs      min []  Vasopneumatic Device, press/temp:     min []  Other:    [x] Skin assessment post-treatment (if applicable):    [x]  intact    []  redness- no adverse reaction     []redness - adverse reaction:        35/15 min Therapeutic Exercise:  [x]  See flow sheet   Rationale:      increase ROM, increase strength and improve coordination to improve the patients ability to perform ADLs/ bending/stooping/ lifting/prolong sitting, stding and amb/ stairs with ease          8 min Therapeutic Activity: postural/bed mobility training   piriformis str for donning/doffing shoes and socks with ease   Rationale:    increase ROM, increase strength and improve coordination to improve the patients ability to perform proper posture, bed mobility and transfers without p!       min Patient Education:  yes  Reviewed HEP   []  Progressed/Changed HEP based on:  Pt ed on importance and benefits of compliance with HEP, core strength/stability and proper posture; pt verbalized understanding       Other Objective/Functional Measures:    VCs + demo to perform proper technique for TE    initiated tband rows and ext SL abd and piriformis str without c/o p!  demos SLR without ext lag   Post Treatment Pain Level (on 0 to 10) scale:   0  / 10     ASSESSMENT  Assessment/Changes in Function:   pt demos decrease piriformis str L>R  demos decrease glut med strength demos hip flexor substitution with SL abd    []  See Progress Note/Recertification   Patient will continue to benefit from skilled PT services to modify and progress therapeutic interventions, address functional mobility deficits, address ROM deficits, address strength deficits, analyze and address soft tissue restrictions, analyze and cue movement patterns, analyze and modify body mechanics/ergonomics, assess and modify postural abnormalities and instruct in home and community integration to attain remaining goals. Progress toward goals / Updated goals: · Short Term Goals: To be accomplished in  2  weeks:  1. Pt will be compliant with HEP for symptom management at home. MET  2. Pt will perform x10 supine bridges without pain to indicate increased core stability. 3. Pt will report >/= 50% decrease in RLE pain to indicate decreased stress on spine  · Long Term Goals: To be accomplished in  4  weeks:  1. Pt will be independent with HEP at D/C for self management.   2. Pt will increase FS FOTO Score to >/= 55 to indicate a significant decrease in functional disability. 3. Pt will report >/= 75% improvement in amb tolerance for increased ease grocery shopping.       PLAN  []  Upgrade activities as tolerated yes Continue plan of care   []  Discharge due to :    []  Other:      Therapist: Gregor Tucker PTA    Date: 12/14/2017 Time: 12:35 PM     Future Appointments  Date Time Provider Alok Ortega   12/21/2017 10:30 AM [address] [place] [address]   2/12/2018 1:45 PM Ritesh Gilbert MD [id number] [facility]

## 2017-12-21 ENCOUNTER — HOSPITAL ENCOUNTER (OUTPATIENT)
Dept: PHYSICAL THERAPY | Age: 82
Discharge: HOME | End: 2017-12-21
Payer: MEDICARE

## 2017-12-21 PROCEDURE — 97530 THERAPEUTIC ACTIVITIES: CPT

## 2017-12-21 PROCEDURE — 97110 THERAPEUTIC EXERCISES: CPT

## 2017-12-21 NOTE — PROGRESS NOTES
PHYSICAL THERAPY - DAILY TREATMENT NOTE    Patient Name: Mer Schulz        Date: 2017  : 1929   YES Patient  Verified  Visit #:   5   of   8  Insurance: Payor: Arturo Tolentino / Plan: [place] MEDICARE PART A & B / Product Type: Medicare /      In time: 10:23 Out time: 11:20   Total Treatment Time: 57     Medicare Time Tracking (below)   Total Timed Codes (min):  57 1:1 Treatment Time:  57     TREATMENT AREA =  Lower back pain [M54.5]    SUBJECTIVE    Pain Level (on 0 to 10 scale):  2-3  / 10   Medication Changes/New allergies or changes in medical history, any new surgeries or procedures? NO     If yes, update Summary List   Subjective Functional Status/Changes:  []  No changes reported     \"I have a little soreness in my back that comes and goes. \"          OBJECTIVE    49 min Therapeutic Exercise:  [x]  See flow sheet   Rationale:      increase ROM and increase strength to improve the patients ability to perform ADL's with improved core stability. 8 min Therapeutic Activity: Relative SLS with hip 3-way   Rationale: To increase safety and efficiency with ADL's.     min Patient Education:  YES  Reviewed HEP   []  Progressed/Changed HEP based on:   Patient reports compliance     Other Objective/Functional Measures:    Pt had minor increase in symptoms due to difficulty performing H/L exercises without L/S hyperextension. Pt symptoms abolished with SB roll-outs with compensation 2x to the L and 1x R, as performed with rotational exercises. Post Treatment Pain Level (on 0 to 10) scale:   0  / 10     ASSESSMENT    Assessment/Changes in Function:     Pt presents to PT progressing in core stability exercise tolerance with decrease in symptoms.      []  See Progress Note/Recertification   Patient will continue to benefit from skilled PT services to modify and progress therapeutic interventions, address functional mobility deficits, address ROM deficits, address strength deficits, analyze and address soft tissue restrictions, analyze and cue movement patterns, analyze and modify body mechanics/ergonomics and assess and modify postural abnormalities to attain remaining goals. Progress toward goals / Updated goals: Addressed STG 3 with neutral spine education with hip 3-way and WB exercises.      PLAN    [x]  Upgrade activities as tolerated YES Continue plan of care   []  Discharge due to :    []  Other:      Therapist: Chika Benoit    Date: 12/21/2017 Time: 1:16 PM     Future Appointments  Date Time Provider Alok Ortega   1/2/2018 2:30 PM Counts include 234 beds at the [facility]   1/4/2018 10:00 AM Apolinar Hudson [facility]   1/9/2018 11:00 AM Counts include 234 beds at the [facility]   1/11/2018 10:00 AM Apolinar Hudson [facility]   1/16/2018 11:00 AM Counts include 234 beds at the [facility]   1/18/2018 10:30 AM Counts include 234 beds at the [facility]   1/23/2018 11:00 AM Apolinar Hudson [facility]   1/25/2018 11:00 AM Apolinar Hudson [facility]   [facility]   1/31/2018 11:00 AM Counts include 234 beds at the [facility]   2/12/2018 1:45 PM Daphne Marcus MD [id number] [facility]

## 2018-01-02 ENCOUNTER — HOSPITAL ENCOUNTER (OUTPATIENT)
Dept: PHYSICAL THERAPY | Age: 83
Discharge: HOME | End: 2018-01-02
Payer: MEDICARE

## 2018-01-02 PROCEDURE — G8979 MOBILITY GOAL STATUS: HCPCS

## 2018-01-02 PROCEDURE — G8980 MOBILITY D/C STATUS: HCPCS

## 2018-01-02 PROCEDURE — 97110 THERAPEUTIC EXERCISES: CPT

## 2018-01-02 NOTE — PROGRESS NOTES
[facility] PHYSICAL THERAPY  [address] [place] [address] Yuki Brock, [address] - Phone: [phone]  Fax: [phone] OF CARE/RECERTIFICATION FOR PHYSICAL THERAPY          Patient Name: Gumaro Collins : 1929   Treatment/Medical Diagnosis: Lower back pain [M54.5]   Onset Date: Chronic    Referral Source: Camille Sheth MD Start of Care [facility]): 2017   Prior Hospitalization: See Medical History Provider #: [id number]   Prior Level of Function: Amb without AD, Independent with ADLs   Comorbidities: Scoliosis, osteoporosis   Medications: Verified on Patient Summary List   Visits from [facility]: 6 Missed Visits: 0     Goal/Measure of Progress Goal Met? 1. Pt will be compliant with HEP for symptom management at home. Status at last Eval: NA Current Status: compliant yes   2. Pt will report >/= 50% decrease in RLE pain to indicate decreased stress on spine   Status at last Eval: NA Current Status: 0% with current HEP no   3. Pt will increase FS FOTO Score to >/= 55 to indicate a significant decrease in functional disability   Status at last Eval: 46 Current Status: 57 yes     Therapy has consisted of five follow-up sessions which consist of warm-up on the NuStep, mat core strengthening exercises recently progressed to standing exercises and repeated L/S flexion to address stenosis related symptoms. The pt started performing 2 sets of trunk rotation exercises to the L and one set to the R to elongate shortened tissues due to the pt's scoliotic curves. Key Functional Changes/Progress: The pt's FOTO score improved to 57 indicating 43% limitation in functional ability. The pt reports minimal effect on ADL's; however, the pt performs a shortened HEP. Performance of the pt's exercise program abolishes symptoms which was constructed into a more thorough HEP to address symptoms.  The pt demonstrates mild hip girdle MMT deficits (3/5 hip ER and abduction) which will improve with the pt's progressed standing strengthening program.    Problem List: pain affecting function, decrease ROM, decrease strength, impaired gait/ balance, decrease ADL/ functional abilitiies, decrease activity tolerance, decrease flexibility/ joint mobility and decrease transfer abilities   Treatment Plan may include any combination of the following: Therapeutic exercise, Therapeutic activities, Neuromuscular re-education, Physical agent/modality, Gait/balance training, Manual therapy, Aquatic therapy, Patient education, Self Care training, Functional mobility training, Home safety training and Stair training  Patient Goal(s) has been updated and includes:      Goals for this certification period include and are to be achieved in   3-4  weeks:  1. Pt will be independent with HEP at D/C for self management. 2.   Continue STG 2 above. 3.   Pt will demonstrate hip ER MMT 4/5 B to increase pelvic stability with ambulation. Frequency / Duration:   Patient to be seen   1-2   times per week for   3-4    weeks:  G-Codes (GP): Mobility:   Goal  CK= 40-59%  D/C  CK= 40-59%. The severity rating is based on the FOTO Score    Assessments/Recommendations: The patient would continue to benefit from skilled interventions to address the above mentioned functional deficits. See above for more details. If you have any questions/comments please contact us directly at [phone]. Thank you for allowing us to assist in the care of your patient. Therapist Signature: Christiane Hook DPT Date: 6/9/8242   Certification Period:  Reporting Period: 12/5/17-3/4/17  12/5/17 - 1/2/18 Time: 2:33 PM   NOTE TO PHYSICIAN:  PLEASE COMPLETE THE ORDERS BELOW AND FAX TO   [facility]: [phone]   If you are unable to process this request in 24 hours please contact our office: [phone].    ___ I have read the above report and request that my patient continue as recommended.   ___ I have read the above report and request that my patient continue therapy with the following changes/special instructions: ________________________________________________   ___ I have read the above report and request that my patient be discharged from therapy.      Physician Signature:        Date:       Time:

## 2018-01-02 NOTE — PROGRESS NOTES
PHYSICAL THERAPY - DAILY TREATMENT NOTE    Patient Name: Skyler Jones        Date: 2018  : 1929   YES Patient  Verified  Visit #:   6   of   8  Insurance: Payor: Caron Arellano / Plan: [place] MEDICARE PART A & B / Product Type: Medicare /      In time: 2:30 Out time: 3:08   Total Treatment Time: 38     Medicare Time Tracking (below)   Total Timed Codes (min):  38 1:1 Treatment Time:  30     TREATMENT AREA =  Lower back pain [M54.5]    SUBJECTIVE    Pain Level (on 0 to 10 scale):  2  / 10   Medication Changes/New allergies or changes in medical history, any new surgeries or procedures? NO     If yes, update Summary List   Subjective Functional Status/Changes:  []  No changes reported     \"I feel better when I leave here, but nothing has changed at home. \"          OBJECTIVE    38 (30) min Therapeutic Exercise:  [x]  See flow sheet   Rationale:      increase ROM and increase strength to improve the patients ability to perform ADL's with improved core stability. min Patient Education:  YES  Reviewed HEP   []  Progressed/Changed HEP based on:   Patient reports compliance     Other Objective/Functional Measures:    See PN. Post Treatment Pain Level (on 0 to 10) scale:   0  / 10     ASSESSMENT    Assessment/Changes in Function:     See PN.     []  See Progress Note/Recertification   Patient will continue to benefit from skilled PT services to modify and progress therapeutic interventions, address functional mobility deficits, address ROM deficits, address strength deficits, analyze and address soft tissue restrictions, analyze and cue movement patterns, analyze and modify body mechanics/ergonomics and assess and modify postural abnormalities to attain remaining goals. Progress toward goals / Updated goals:    See PN.      PLAN    [x]  Upgrade activities as tolerated YES Continue plan of care   []  Discharge due to :    []  Other:      Therapist: William Crandall    Date: 2018 Time: 5:22 PM Future Appointments  Date Time Provider Alok Shannon   1/4/2018 10:00 AM Renato German PTA [facility]   [facility]   1/11/2018 10:00 AM Renato German [facility]   1/16/2018 11:00 AM [facility]   1/18/2018 10:30 AM [facility]   1/23/2018 11:00 AM Renato German [facility]   [facility]2018 [facility]   1/29/2018 10:00 AM [facility]   1/31/2018 11:00 AM [facility]   2/12/2018 1:45 PM Pamela Duvall MD [address] [place] [address]

## 2018-01-09 ENCOUNTER — HOSPITAL ENCOUNTER (OUTPATIENT)
Dept: PHYSICAL THERAPY | Age: 83
Discharge: HOME | End: 2018-01-09
Payer: MEDICARE

## 2018-01-09 PROCEDURE — 97110 THERAPEUTIC EXERCISES: CPT

## 2018-01-09 PROCEDURE — G8978 MOBILITY CURRENT STATUS: HCPCS

## 2018-01-09 PROCEDURE — G8979 MOBILITY GOAL STATUS: HCPCS

## 2018-01-09 PROCEDURE — 97530 THERAPEUTIC ACTIVITIES: CPT

## 2018-01-09 NOTE — PROGRESS NOTES
PHYSICAL THERAPY - DAILY TREATMENT NOTE    Patient Name: Sheng Burrell        Date: 2018  : 1929   YES Patient  Verified  Visit #:   +  Insurance: Payor: Vick Pop / Plan: [place] MEDICARE PART A & B / Product Type: Medicare /      In time: 11:00 Out time: 11:45   Total Treatment Time: 45     Medicare Time Tracking (below)   Total Timed Codes (min):  45 1:1 Treatment Time:  45     TREATMENT AREA =  Lower back pain [M54.5]    SUBJECTIVE    Pain Level (on 0 to 10 scale): 3-4  / 10   Medication Changes/New allergies or changes in medical history, any new surgeries or procedures? NO     If yes, update Summary List   Subjective Functional Status/Changes:  []  No changes reported     \"It hurts more today for some reason. I haven't been doing as many exercises, because its been cold and I feel stiff. \"          OBJECTIVE    35 min Therapeutic Exercise:  [x]  See flow sheet   Rationale:      increase ROM and increase strength to improve the patients ability to perform ADL's with improved core stability. 10 min Therapeutic Activity: Mini-squats, relative SLS with standing hip abduction   Rationale: To increase safety and efficiency with ADL's.       min Patient Education:  YES  Reviewed HEP   []  Progressed/Changed HEP based on:   Patient reports compliance     Other Objective/Functional Measures:    Pt was able to resume H/L core exercises without pain with sufficient vc's to perform with no anterior pelvic tile. Initiated mini-squats with manu vc's needed to perform with a neutral spine and no anterior translation of the knees. Post Treatment Pain Level (on 0 to 10) scale:   0  / 10     ASSESSMENT    Assessment/Changes in Function:     Mobility G-codes based off of hip ER MMT: Current CK, Goal CJ.     Pt presents to PT able to manage increased symptoms with L/S flexion and PPT's.      []  See Progress Note/Recertification   Patient will continue to benefit from skilled PT services to modify and progress therapeutic interventions, address functional mobility deficits, address ROM deficits, address strength deficits, analyze and address soft tissue restrictions, analyze and cue movement patterns and analyze and modify body mechanics/ergonomics to attain remaining goals. Progress toward goals / Updated goals: Addressed hip ER MMT with clams.      PLAN    [x]  Upgrade activities as tolerated YES Continue plan of care   []  Discharge due to :    []  Other:      Therapist: Jacqueline Sam    Date: 1/9/2018 Time: 11:08 AM     Future Appointments  Date Time Provider Alok Ortega   1/11/2018 10:00 AM Vic Saenz [facility]   1/16/2018 11:00 AM [address] [place] [address]   1/18/2018 10:30 AM Jacqueline [facility]   1/23/2018 11:00 AM Vic Saenz [facility]   1/25/2018 11:00 AM Vic Saenz [facility]   1/29/2018 10:00 AM Sue [facility]   [facility]   2/12/2018 1:45 PM [place] [id number] [facility]

## 2018-01-11 ENCOUNTER — HOSPITAL ENCOUNTER (OUTPATIENT)
Dept: PHYSICAL THERAPY | Age: 83
End: 2018-01-11
Payer: MEDICARE

## 2018-01-16 ENCOUNTER — APPOINTMENT (OUTPATIENT)
Dept: PHYSICAL THERAPY | Age: 83
End: 2018-01-16
Payer: MEDICARE

## 2018-01-18 ENCOUNTER — HOSPITAL ENCOUNTER (OUTPATIENT)
Dept: PHYSICAL THERAPY | Age: 83
Discharge: HOME | End: 2018-01-18
Payer: MEDICARE

## 2018-01-18 PROCEDURE — 97110 THERAPEUTIC EXERCISES: CPT

## 2018-01-18 PROCEDURE — 97530 THERAPEUTIC ACTIVITIES: CPT

## 2018-01-18 NOTE — PROGRESS NOTES
PHYSICAL THERAPY - DAILY TREATMENT NOTE    Patient Name: Ciro Bernardo        Date: 2018  : 1929   YES Patient  Verified  Visit #:   8      8+  Insurance: Payor: Rebecca Jeong / Plan: [place] MED[place]ARE PART [place] & B / Product Type: Medicare /      In time: 10:25 Out time: 11:05   Total Treatment Time: 40     Medicare Time Tracking (below)   Total Timed Codes (min):  40 1:1 Treatment Time:  40     TREATMENT AREA =  Lower back pain [M54.5]    SUBJECTIVE    Pain Level (on 0 to 10 scale):  4  / 10   Medication Changes/New allergies or changes in medical history, any new surgeries or procedures? NO     If yes, update Summary List   Subjective Functional Status/Changes:  []  No changes reported     \"Its cold outside so I'm pretty stiff. \"          OBJECTIVE    30 min Therapeutic Exercise:  [x]  See flow sheet   Rationale:      increase ROM and increase strength to improve the patients ability to perform ADL's with improved core stability     10 min Therapeutic Activity: Mini-squats, relative SLS with standing hip abduction   Rationale: To increase safety and efficiency with ADL's.      min Patient Education:  YES  Reviewed HEP   []  Progressed/Changed HEP based on:   Patient reports compliance     Other Objective/Functional Measures:    Pt had no pain with mini-squats and required minimal vc's to perform with increased JAXON and without genu valgum. Piriformis self-trigger point release decreased pt symptoms. Post Treatment Pain Level (on 0 to 10) scale:   0  / 10     ASSESSMENT    Assessment/Changes in Function:     Pt is progressing appropriately towards goals and plateauing in symptoms management. Preparing for DC in the coming visits.      []  See Progress Note/Recertification   Patient will continue to benefit from skilled PT services to modify and progress therapeutic interventions, address functional mobility deficits, address ROM deficits, address strength deficits, analyze and address soft tissue restrictions, analyze and cue movement patterns, analyze and modify body mechanics/ergonomics and assess and modify postural abnormalities to attain remaining goals. Progress toward goals / Updated goals: Addressed hip ER MMT with mini-squats and clams.      PLAN    [x]  Upgrade activities as tolerated YES Continue plan of care   []  Discharge due to :    []  Other:      Therapist: Maximino Chisholm    Date: 1/18/2018 Time: 11:01 AM     Future Appointments  Date Time Provider Alok Ortega   1/23/2018 11:00 AM [facility]   1/25/2018 11:00 AM [facility]   1/29/2018 10:00 AM [facility]   1/31/2018 11:00 AM Maximino Chisholm [facility]   2/12/2018 1:45 PM Sue Gonzales MD [id number] [facility]

## 2018-01-23 ENCOUNTER — HOSPITAL ENCOUNTER (OUTPATIENT)
Dept: PHYSICAL THERAPY | Age: 83
Discharge: HOME | End: 2018-01-23
Payer: MEDICARE

## 2018-01-23 PROCEDURE — 97530 THERAPEUTIC ACTIVITIES: CPT

## 2018-01-23 PROCEDURE — 97110 THERAPEUTIC EXERCISES: CPT

## 2018-01-23 NOTE — PROGRESS NOTES
PHYSICAL THERAPY - DAILY TREATMENT NOTE    Patient Name: Veto Dunn        Date: 2018  : 1929   yes Patient  Verified  Visit #:    Insurance: Payor: Kerry Vargas / Plan: [place] MEDICARE PART A & B / Product Type: Medicare /      In time: 1100 Out time: 1145   Total Treatment Time: 45     Medicare Time Tracking (below)   Total Timed Codes (min): 45 1:1 Treatment Time:  24     TREATMENT AREA =  Lower back pain [M54.5]    SUBJECTIVE  Pain Level (on 0 to 10 scale):  5  / 10   Medication Changes/New allergies or changes in medical history, any new surgeries or procedures?    no  If yes, update Summary List   Subjective Functional Status/Changes:  []  No changes reported     \"I don't know what pain and hurt is anymore.  I do feel better when I leave here\"          OBJECTIVE      30/9 min Therapeutic Exercise:  [x]  See flow sheet   Rationale:      increase ROM, increase strength and improve coordination to improve the patients ability to perform ADLs/ bending/stooping/ lifting/prolong sitting, stding and amb/ stairs with ease          15 min Therapeutic Activity: postural training   piriformis str for donning/doffing shoes and socks with ease  step lunge  balance on airex    Rationale:    increase ROM, increase strength and improve coordination to improve the patients ability to perform proper posture, bed mobility and transfers without p!       min Patient Education:  yes  Reviewed HEP   []  Progressed/Changed HEP based on:  Pt ed on importance and benefits of compliance with HEP, core strength/stability and proper posture; pt verbalized understanding       Other Objective/Functional Measures:    VCs + demo to perform proper technique for TE    initiated step lunge, airex to 3 way hip; and prone knee flex/quad str without c/o P!  2 finger support on airex without LOB  demos increase R lat trunk flex with mini squats; improved with TCs   Post Treatment Pain Level (on 0 to 10) scale:   0  / 10 ASSESSMENT  Assessment/Changes in Function:   Progressed there-ex without c/o increase p!  demos symmetrical Wbing with mini squats with VCs   []  See Progress Note/Recertification   Patient will continue to benefit from skilled PT services to modify and progress therapeutic interventions, address functional mobility deficits, address ROM deficits, address strength deficits, analyze and address soft tissue restrictions, analyze and cue movement patterns, analyze and modify body mechanics/ergonomics, assess and modify postural abnormalities and instruct in home and community integration to attain remaining goals. Progress toward goals / Updated goals:  · Long Term Goals: To be accomplished in  4  weeks:  1. Pt will be independent with HEP at D/C for self management. 2. Pt will increase FS FOTO Score to >/= 55 to indicate a significant decrease in functional disability. 3.   Pt will demonstrate hip ER MMT 4/5 B to increase pelvic stability with ambulation.       PLAN  []  Upgrade activities as tolerated yes Continue plan of care   []  Discharge due to :    []  Other:      Therapist: Ruth Mooney PTA    Date: 1/23/2018 Time: 10:31 AM     Future Appointments  Date Time Provider Alok Ortega   1/23/2018 11:00 AM [facility]   1/25/2018 11:00 AM [facility]   1/29/2018 10:00 AM [facility]   1/31/2018 11:00 AM [facility]   2/12/2018 1:45 PM Rosita Roth MD [id number] [facility]

## 2018-01-25 ENCOUNTER — HOSPITAL ENCOUNTER (OUTPATIENT)
Dept: PHYSICAL THERAPY | Age: 83
Discharge: HOME | End: 2018-01-25
Payer: MEDICARE

## 2018-01-25 PROCEDURE — 97110 THERAPEUTIC EXERCISES: CPT

## 2018-01-25 PROCEDURE — 97530 THERAPEUTIC ACTIVITIES: CPT

## 2018-01-25 NOTE — PROGRESS NOTES
PHYSICAL THERAPY - DAILY TREATMENT NOTE    Patient Name: Kenny Acosta        Date: 2018  : 1929   yes Patient  Verified  Visit #:  10 of   14  Insurance: Payor: Madhavi Human / Plan: [place] MEDICARE PART A & B / Product Type: Medicare /      In time: 1100 Out time: 7735   Total Treatment Time: 65     Medicare Time Tracking (below)   Total Timed Codes (min): 65 1:1 Treatment Time: 45     TREATMENT AREA =  Lower back pain [M54.5]    SUBJECTIVE  Pain Level (on 0 to 10 scale): 4 / 10   Medication Changes/New allergies or changes in medical history, any new surgeries or procedures?    no  If yes, update Summary List   Subjective Functional Status/Changes:  []  No changes reported     \"I do notice that I am more agile the last 2 weeks from this PT\"        OBJECTIVE  50/30 min Therapeutic Exercise:  [x]  See flow sheet   Rationale:      increase ROM, increase strength and improve coordination to improve the patients ability to perform ADLs/ bending/stooping/ lifting/prolong sitting, stding and amb/ stairs with ease          15 min Therapeutic Activity: postural training   piriformis str for donning/doffing shoes and socks with ease  step lunge  balance on airex    Rationale:    increase ROM, increase strength and improve coordination to improve the patients ability to perform proper posture, bed mobility and transfers without p!       min Patient Education:  yes  Reviewed HEP   []  Progressed/Changed HEP based on:  Pt ed on importance and benefits of compliance with HEP, core strength/stability and proper posture; pt verbalized understanding       Other Objective/Functional Measures:    VCs + demo to perform proper technique for TE    initiated step lunge lat, and increased to GTB for clams without c/o p!  demos fair bridge form     Post Treatment Pain Level (on 0 to 10) scale:   0  / 10     ASSESSMENT  Assessment/Changes in Function:   Progressed there-ex without c/o increase p!  increased to GTB with clams; progressing towards LTG #3   []  See Progress Note/Recertification   Patient will continue to benefit from skilled PT services to modify and progress therapeutic interventions, address functional mobility deficits, address ROM deficits, address strength deficits, analyze and address soft tissue restrictions, analyze and cue movement patterns, analyze and modify body mechanics/ergonomics, assess and modify postural abnormalities and instruct in home and community integration to attain remaining goals. Progress toward goals / Updated goals:  · Long Term Goals: To be accomplished in  4  weeks:  1. Pt will be independent with HEP at D/C for self management. 2. Pt will increase FS FOTO Score to >/= 55 to indicate a significant decrease in functional disability. 3.   Pt will demonstrate hip ER MMT 4/5 B to increase pelvic stability with ambulation.       PLAN  []  Upgrade activities as tolerated yes Continue plan of care   []  Discharge due to :    []  Other:      Therapist: Sintia Kwan PTA    Date: 1/25/2018 Time: 12:02 PM     Future Appointments  Date Time Provider Alok Ortega   1/29/2018 10:00 AM [facility]   1/31/2018 11:00 AM [place] [facility]   2/12/2018 1:45 PM Alec Kirk MD [id number] [facility] [address]

## 2018-01-29 ENCOUNTER — HOSPITAL ENCOUNTER (OUTPATIENT)
Dept: PHYSICAL THERAPY | Age: 83
Discharge: HOME | End: 2018-01-29
Payer: MEDICARE

## 2018-01-29 PROCEDURE — 97530 THERAPEUTIC ACTIVITIES: CPT

## 2018-01-29 PROCEDURE — 97110 THERAPEUTIC EXERCISES: CPT

## 2018-01-29 NOTE — PROGRESS NOTES
PHYSICAL THERAPY - DAILY TREATMENT NOTE    Patient Name: Renita Blanchard        Date: 2018  : 1929   yes Patient  Verified  Visit #:    Insurance: Payor: Lata Gutierrez / Plan: [place] MEDICARE PART A & B / Product Type: Medicare /      In time: 1000 Out time: 1106   Total Treatment Time: 66     Medicare Time Tracking (below)   Total Timed Codes (min): 66 1:1 Treatment Time: 30     TREATMENT AREA =  Lower back pain [M54.5]    SUBJECTIVE  Pain Level (on 0 to 10 scale): 0 / 10   Medication Changes/New allergies or changes in medical history, any new surgeries or procedures?    no  If yes, update Summary List   Subjective Functional Status/Changes:  []  No changes reported     \"I was hurting this morning but its ok now.  I feel these bands in all the muscles\"        OBJECTIVE  51/15 min Therapeutic Exercise:  [x]  See flow sheet   Rationale:      increase ROM, increase strength and improve coordination to improve the patients ability to perform ADLs/ bending/stooping/ lifting/prolong sitting, stding and amb/ stairs with ease          15 min Therapeutic Activity: postural training   piriformis str for donning/doffing shoes and socks with ease  step lunge  balance on airex    Rationale:    increase ROM, increase strength and improve coordination to improve the patients ability to perform proper posture, bed mobility and transfers without p!       min Patient Education:  yes  Reviewed HEP   []  Progressed/Changed HEP based on:  Pt ed on importance and benefits of compliance with HEP, core strength/stability and proper posture; pt verbalized understanding       Other Objective/Functional Measures:    VCs + demo to perform proper technique for TE    initiated tband walkaways,pallof press#1, 2 without c/o p!    demos ant translation with mini squats improved with VCs   Post Treatment Pain Level (on 0 to 10) scale:   0  / 10     ASSESSMENT  Assessment/Changes in Function:   Progressed there-ex without c/o increase p! []  See Progress Note/Recertification   Patient will continue to benefit from skilled PT services to modify and progress therapeutic interventions, address functional mobility deficits, address ROM deficits, address strength deficits, analyze and address soft tissue restrictions, analyze and cue movement patterns, analyze and modify body mechanics/ergonomics, assess and modify postural abnormalities and instruct in home and community integration to attain remaining goals. Progress toward goals / Updated goals:  · Long Term Goals: To be accomplished in  4  weeks:  1. Pt will be independent with HEP at D/C for self management. 2. Pt will increase FS FOTO Score to >/= 55 to indicate a significant decrease in functional disability. 3.   Pt will demonstrate hip ER MMT 4/5 B to increase pelvic stability with ambulation.       PLAN  []  Upgrade activities as tolerated yes Continue plan of care   []  Discharge due to :    [x]  Other: assess goals and d/c with HEP NV     Therapist: Sintia Kwan PTA    Date: 1/29/2018 Time: 10:40 AM     Future Appointments  Date Time Provider Alok Ortega   1/31/2018 11:00 AM [address] [place] [address]   2/12/2018 1:45 PM Alec Kirk MD [id number] [facility]

## 2018-01-31 ENCOUNTER — HOSPITAL ENCOUNTER (OUTPATIENT)
Dept: PHYSICAL THERAPY | Age: 83
Discharge: HOME | End: 2018-01-31
Payer: MEDICARE

## 2018-01-31 PROCEDURE — 97110 THERAPEUTIC EXERCISES: CPT

## 2018-01-31 PROCEDURE — G8980 MOBILITY D/C STATUS: HCPCS

## 2018-01-31 PROCEDURE — G8979 MOBILITY GOAL STATUS: HCPCS

## 2018-01-31 NOTE — PROGRESS NOTES
[address] PHYSICAL THERAPY  [address] [place] [address], [address] [employer] [address] - Phone: [phone]  Fax: [phone] SUMMARY  Patient Name: Tootie Culp : 1929   Treatment/Medical Diagnosis: Lower back pain [M54.5]   Referral Source: Santos Comer MD     Date of Initial Visit: 17 Attended Visits: 12 Missed Visits: 0     SUMMARY OF TREATMENT  Patient's POC has consisted of therex, therapeutic activities, manual therapy prn, modalities prn, pt. education, and a comprehensive HEP. Treatment strategies used to address functional mobility deficits, ROM deficits, strength deficits, analyze and address soft tissue restrictions, analyze and cue movement patterns, analyze and modify body mechanics/ergonomics, assess and modify postural abnormalities and instruct in home and community integration. CURRENT STATUS  Patient now fully independent in performing her therex/HEP. She reports significant improvement in overall reduction and management of symptoms (70%). She is able to alleviate symptoms via her HEP, and has further improved functional strength/stability to improve biomechanics for ADL's and reduce spinal stress. GOALS/MEASURE OF PROGRESS Goal Met? Patient to be Independent with HEP to self-manage/prevent symptoms after DC. Pt will report >/= 50% decrease in RLE pain to indicate decreased stress on spine  Pt will demonstrate hip ER MMT 4/5 B to increase pelvic stability with ambulation. MET    MET    MET       G-Codes: Mobility   Goal  CK= 40-59%  D/C  CK= 40-59%. The severity rating is based on the FOTO Score    RECOMMENDATIONS  Discontinue therapy. Progressing towards or have reached established goals. If you have any questions/comments please contact us directly at [phone]. Thank you for allowing us to assist in the care of your patient. Therapist Signature: Yolette Esparza \"BJ\" Malika Whaley DPT, Cert. MDT, Cert. DN, Cert.  SMT Date: 1/31/18   Reporting Period: 7/2/66-0/74/01     Certification Period 12/5/17-3/3/18 Time: 11:29 AM     NOTE TO PHYSICIAN:  PLEASE COMPLETE THE ORDERS BELOW AND FAX TO   [facility]: [phone]  If you are unable to process this request in 24 hours please contact our office: [phone]    ___ I have read the above report and request that my patient continue as recommended.   ___ I have read the above report and request that my patient continue therapy with the following changes/special instructions:_________________________________________________________   ___ I have read the above report and request that my patient be discharged from therapy.      Physician Signature:        Date:     Time:

## 2018-01-31 NOTE — PROGRESS NOTES
PHYSICAL THERAPY - DAILY TREATMENT NOTE    Patient Name: Bhakti Peraza        Date: 2018  : 1929   YES Patient  Verified  Visit #:     Insurance: Payor: Karina Wilder / Plan: [place] MEDICARE PART A & B / Product Type: Medicare /      In time: 10:50 Out time: 11:30   Total Treatment Time: 40     Medicare Time Tracking (below)   Total Timed Codes (min):  40 1:1 Treatment Time:  40     TREATMENT AREA = Lower back pain [M54.5]    SUBJECTIVE    Pain Level (on 0 to 10 scale):  0  / 10   Medication Changes/New allergies or changes in medical history, any new surgeries or procedures?     NO    If yes, update Summary List   Subjective Functional Status/Changes:  []  No changes reported     \"I'm ready to graduate \"          OBJECTIVE    Therapeutic Procedures:  Min Procedure Specifics + Rationale   n/a [x]  Patient Education (performed throughout session) [x] Review HEP    [] Progressed/Changed HEP based on:   [] proper performance and advancement of Therex/TA   [] reduction in pain level    [] increased functional capacity       [] change in directional preference   40(40) [x] Therapeutic Exercise   [x]  See Flowsheet   Rationale: increase ROM and increase strength to improve the patients ability to participate in ADL's       Skin assessment post-treatment:  []intact []redness- no adverse reaction       []redness - adverse reaction:     Other Objective/Functional Measures:    See DC     Post Treatment Pain Level (on 0 to 10) scale:   0  / 10     ASSESSMENT    Assessment/Changes in Function:      []  See Plan of Care  []  See Progress Note/ Recertification  [x]  See Discharge Summary        Patient will continue to benefit from skilled PT services to n/a  to attain remaining goals   Progress toward goals / Updated goals:    See DC     PLAN    [x]  Upgrade activities as tolerated  [x]  Update interventions per flow sheet NO Continue plan of care   []  Discharge due to :    []  Other:      Therapist: Paul Tucker \"BJ\" Richard, LALOT, Cert. MDT, Cert. DN, Cert.  SMT    Date: 1/31/2018 Time: 10:54 AM   Future Appointments  Date Time Provider Alok Ortega   1/31/2018 11:00 AM Amparo Eng [facility]   2/12/2018 1:45 PM Hermelinda Srivastava MD [id number] [facility] [address]

## 2018-02-12 ENCOUNTER — OFFICE VISIT (OUTPATIENT)
Dept: INTERNAL MEDICINE CLINIC | Age: 83
End: 2018-02-12

## 2018-02-12 VITALS
RESPIRATION RATE: 16 BRPM | OXYGEN SATURATION: 100 % | TEMPERATURE: 96 F | SYSTOLIC BLOOD PRESSURE: 142 MMHG | HEIGHT: 56 IN | HEART RATE: 73 BPM | WEIGHT: 99 LBS | DIASTOLIC BLOOD PRESSURE: 82 MMHG | BODY MASS INDEX: 22.27 KG/M2

## 2018-02-12 DIAGNOSIS — F41.9 ANXIETY: ICD-10-CM

## 2018-02-12 DIAGNOSIS — K58.8 OTHER IRRITABLE BOWEL SYNDROME: Primary | ICD-10-CM

## 2018-02-12 RX ORDER — HYDROCORTISONE ACETATE 25 MG/1
25 SUPPOSITORY RECTAL 2 TIMES DAILY
Qty: 180 EACH | Refills: 0 | Status: SHIPPED | OUTPATIENT
Start: 2018-02-12 | End: 2018-08-21 | Stop reason: SDUPTHER

## 2018-02-12 RX ORDER — LACTULOSE 10 G/15ML
10 SOLUTION ORAL; RECTAL DAILY
Qty: 473 ML | Refills: 2 | Status: SHIPPED | OUTPATIENT
Start: 2018-02-12 | End: 2018-06-20 | Stop reason: SDUPTHER

## 2018-02-12 NOTE — MR AVS SNAPSHOT
Roopa Snow 
 
 
 [address] [facility] [id number] 
[phone] Patient: Jer Spence MRN: [id number] Visit Information Date & Time Provider Department Dept. Phone Encounter #  
 2/12/2018  2:00 PM Bakari Joseph [facility] [phone] [id number] Follow-up Instructions Return in about 3 months (around 5/12/2018), or if symptoms worsen or fail to improve. Upcoming Health Maintenance Date Due  
 GLAUCOMA SCREENING Q2Y 10/21/2017 MEDICARE YEARLY EXAM 5/10/2018 DTaP/Tdap/Td series (2 - Td) 8/10/2026 Allergies as of 2/12/2018  Review Complete On: 2/12/2018 By: Rosie Kapoor Severity Noted Reaction Type Reactions Flexeril [Cyclobenzaprine]  09/26/2011   Not Verified Unknown (comments)  
 unknown Pcn [Penicillins]  09/26/2011   Topical Rash Current Immunizations  Reviewed on 10/23/2012 Name Date Influenza High Dose Vaccine PF 10/27/2016, 9/30/2015 Influenza Vaccine Split 10/23/2012, 10/25/2011 Pneumococcal Conjugate (PCV-13) 10/28/2015 Tdap 8/10/2016 Not reviewed this visit You Were Diagnosed With   
  
 Codes Comments Other irritable bowel syndrome    -  Primary ICD-10-CM: K58.8 ICD-9-CM: 160.7 Anxiety     ICD-10-CM: F41.9 ICD-9-CM: 300.00 Vitals BP Pulse Temp Resp Height(growth percentile) Weight(growth percentile) 142/82 (BP 1 Location: Right arm, BP Patient Position: Sitting) 73 96 °F (35.6 °C) (Oral) 16 4' 8\" (1.422 m) 99 lb (44.9 kg) SpO2 BMI OB Status Smoking Status 100% 22.2 kg/m2 Postmenopausal Never Smoker Vitals History BMI and BSA Data Body Mass Index Body Surface Area  
 22.2 kg/m 2 1.33 m 2 Preferred Pharmacy Pharmacy Name Phone [facility] [address] [address]  [facility] [phone] Your Updated Medication List  
  
   
 This list is accurate as of: 2/12/18  2:28 PM.  Always use your most recent med list.  
  
  
  
  
 aspirin delayed-release 81 mg tablet Take 1 Tab by mouth daily. brimonidine 0.15 % ophthalmic solution Commonly known as:  Nel Soren Administer 1 Drop to both eyes two (2) times a day. escitalopram oxalate 10 mg tablet Commonly known as:  Jacki Zurita Take 1 Tab by mouth daily. fluticasone 50 mcg/actuation nasal spray Commonly known as:  Fabiene Gift 2 Sprays by Both Nostrils route daily. GAS-X 80 mg chewable tablet Generic drug:  simethicone Take 80 mg by mouth every six (6) hours as needed for Flatulence. hydrocortisone 25 mg Supp Commonly known as:  ANUCORT-HC Insert 1 Suppository into rectum two (2) times a day. lactulose 10 gram/15 mL solution Commonly known as:  Carol Noe Take 15 mL by mouth daily. lansoprazole 15 mg capsule Commonly known as:  PREVACID  
1 bid  
  
 linaclotide 72 mcg Cap Commonly known as:  Kathline [place] Take 1 Cap by mouth daily. Indications: Constipation Predominant Irritable Bowel Syndrome LORazepam 0.5 mg tablet Commonly known as:  ATIVAN I tab po q day if needed for anxiety Prescriptions Sent to Pharmacy Refills  
 hydrocortisone (ANUCORT-HC) 25 mg supp 0 Sig: Insert 1 Suppository into rectum two (2) times a day. Class: Normal  
 Pharmacy: [facility] [address] Ph #: [phone] Route: Rectal  
 lactulose (CHRONULAC) 10 gram/15 mL solution 2 Sig: Take 15 mL by mouth daily. Class: Normal  
 Pharmacy: [facility] [address] [facility] Ph #: [phone] Route: Oral  
  
Follow-up Instructions Return in about 3 months (around 5/12/2018), or if symptoms worsen or fail to improve. Patient Instructions Diet for Irritable Bowel Syndrome: Care Instructions Your Care Instructions Irritable bowel syndrome, or IBS, is a problem with the intestines. IBS can cause belly pain, bloating, gas, constipation, and diarrhea. Most people can control their symptoms by changing their diet and easing stress. No specific foods cause everyone with IBS to have symptoms. Doctors don't offer a specific diet to manage symptoms. But many people find that they feel better when they stop eating certain foods. A high-fiber diet may help if you have constipation. Follow-up care is a key part of your treatment and safety. Be sure to make and go to all appointments, and call your doctor if you are having problems. It's also a good idea to know your test results and keep a list of the medicines you take. How can you care for yourself at home? To reduce constipation · Include fruits, vegetables, beans, and whole grains in your diet each day. These foods are high in fiber. Slowly increase the amount of fiber you eat. This helps you avoid a lot of gas. · Drink plenty of fluids, enough so that your urine is light yellow or clear like water. If you have kidney, heart, or liver disease and have to limit fluids, talk with your doctor before you increase the amount of fluids you drink. · Get some exercise every day. Build up slowly to 30 to 60 minutes a day on 5 or more days of the week. · Take a fiber supplement, such as Citrucel or Metamucil, every day if needed. Read and follow all instructions on the label. · Schedule time each day for a bowel movement. Having a daily routine may help. Take your time and do not strain when having a bowel movement. · Check with your doctor before you increase the amount of fiber in your diet. For some people who have IBS, eating more fiber may make some symptoms worse. This includes bloating. To reduce diarrhea You may try giving up foods or drinks one at a time to see whether symptoms improve. Limit or avoid the following: · Alcohol · Caffeine, which is found in coffee, tea, cola drinks, and chocolate · Nicotine, from smoking or chewing tobacco 
· Gas-producing foods, such as beans, broccoli, cabbage, and apples · Dairy products that contain lactose (milk sugar), such as ice cream, milk, cheese, and sour cream 
· Foods and drinks high in sugar, especially fruit juice, soda, candy, and other packaged sweets (such as cookies) · Foods high in fat, including farias, sausage, butter, oils, and anything deep-fried · Sorbitol and xylitol, artificial sweeteners found in some sugarless candies and chewing gum Keep track of foods · Some people with IBS use a daily food diary to keep track of what they eat and whether they have any symptoms after eating certain foods. The diary also can be a good way to record what is going on in your life. · Stress plays a role in IBS. So if you are aware that certain stresses bring on symptoms, you can try to reduce those stresses. Keep mealtimes pleasant · Try to maintain a pleasant environment when you eat. This may reduce stress that can make symptoms likely to occur. · Give yourself plenty of time to eat, rather than eating on the go. Chew your food slowly. Try not to swallow air, which can cause bloating. Where can you learn more? Go to http://tootie-mery.info/. Enter Y318 in the search box to learn more about \"Diet for Irritable Bowel Syndrome: Care Instructions. \" Current as of: May 12, 2017 Content Version: 11.4 © 4630-2097 [employer]. Care instructions adapted under license by [employer] (which disclaims liability or warranty for this information). If you have questions about a medical condition or this instruction, always ask your healthcare professional. [place] [address] any warranty or liability for your use of this information. Constipation: Care Instructions Your Care Instructions Constipation means that you have a hard time passing stools (bowel movements). People pass stools from 3 times a day to once every 3 days. What is normal for you may be different. Constipation may occur with pain in the rectum and cramping. The pain may get worse when you try to pass stools. Sometimes there are small amounts of bright red blood on toilet paper or the surface of stools. This is because of enlarged veins near the rectum (hemorrhoids). A few changes in your diet and lifestyle may help you avoid ongoing constipation. Your doctor may also prescribe medicine to help loosen your stool. Some medicines can cause constipation. These include pain medicines and antidepressants. Tell your doctor about all the medicines you take. Your doctor may want to make a medicine change to ease your symptoms. Follow-up care is a key part of your treatment and safety. Be sure to make and go to all appointments, and call your doctor if you are having problems. It's also a good idea to know your test results and keep a list of the medicines you take. How can you care for yourself at home? · Drink plenty of fluids, enough so that your urine is light yellow or clear like water. If you have kidney, heart, or liver disease and have to limit fluids, talk with your doctor before you increase the amount of fluids you drink. · Include high-fiber foods in your diet each day. These include fruits, vegetables, beans, and whole grains. · Get at least 30 minutes of exercise on most days of the week. Walking is a good choice. You also may want to do other activities, such as running, swimming, cycling, or playing tennis or team sports. · Take a fiber supplement, such as Citrucel or Metamucil, every day. Read and follow all instructions on the label. · Schedule time each day for a bowel movement. A daily routine may help. Take your time having your bowel movement. · Support your feet with a small step stool when you sit on the toilet. This helps flex your hips and places your pelvis in a squatting position. · Your doctor may recommend an over-the-counter laxative to relieve your constipation. Examples are Milk of Magnesia and MiraLax. Read and follow all instructions on the label. Do not use laxatives on a long-term basis. When should you call for help? Call your doctor now or seek immediate medical care if: 
? · You have new or worse belly pain. ? · You have new or worse nausea or vomiting. ? · You have blood in your stools. ? Watch closely for changes in your health, and be sure to contact your doctor if: 
? · Your constipation is getting worse. ? · You do not get better as expected. Where can you learn more? Go to http://tootie-mery.info/. Enter 21 [phone] in the search box to learn more about \"Constipation: Care Instructions. \" Current as of: March 20, 2017 Content Version: 11.4 © 7414-2123 [employer]. Care instructions adapted under license by [employer] (which disclaims liability or warranty for this information). If you have questions about a medical condition or this instruction, always ask your healthcare professional. [address] any warranty or liability for your use of this information. Introducing [facility]! [facility] introduces [facility] patient portal. Now you can access parts of your medical record, email your doctor's office, and request medication refills online. 1. In your internet browser, go to [email or web address]. [employer] 2. Click on the First Time User? Click Here link in the Sign In box. You will see the New Member Sign Up page. 3. Enter your [facility] Access Code exactly as it appears below. You will not need to use this code after youve completed the sign-up process.  If you do not sign up before the expiration date, you must request a new code. 
 
· [employer] Access Code: [email or web address] Expires: 5/13/2018  2:28 PM 
 
4. Enter the last four digits of your Social Security Number (xxxx) and Date of Birth (mm/dd/yyyy) as indicated and click Submit. You will be taken to the next sign-up page. 5. Create a [employer] ID. This will be your [employer] login ID and cannot be changed, so think of one that is secure and easy to remember. 6. Create a [employer] password. You can change your password at any time. 7. Enter your Password Reset Question and Answer. This can be used at a later time if you forget your password. 8. Enter your e-mail address. You will receive e-mail notification when new information is available in [address] 9. Click Sign Up. You can now view and download portions of your medical record. 10. Click the Download Summary menu link to download a portable copy of your medical information. If you have questions, please visit the Frequently Asked Questions section of the [employer] website. Remember, [employer] is NOT to be used for urgent needs. For medical emergencies, dial 911. Now available from your iPhone and Android! Please provide this summary of care documentation to your next provider. Your primary care clinician is listed as Cydney Jarrett Avalanis. If you have any questions after today's visit, please call [phone].

## 2018-02-12 NOTE — PROGRESS NOTES
HISTORY OF PRESENT ILLNESS  Li Davis is a 80 y.o. female. HPI Comments: 79 yo female here for f/u of anxiety, IBS sx. She notes she has not been taking Linzess every day as it 'works too well\". She will take it on days that she does not need to leave the home. She otherwise does not take medication for her constipation. States she did take Miralax in the past but did not help. Having some increased bloating today but no pain. Anxiety sx are stable at this time. Taking lorazepam daily at bedtime. States she tolerates this. Denies depression. Review of Systems   Constitutional: Negative for chills, fever and weight loss. HENT: Negative for congestion and ear pain. Eyes: Negative for blurred vision and pain. Respiratory: Negative for cough and shortness of breath. Cardiovascular: Negative for chest pain, palpitations and leg swelling. Gastrointestinal: Positive for constipation. Negative for abdominal pain, nausea and vomiting. Genitourinary: Negative for frequency and urgency. Musculoskeletal: Negative for joint pain and myalgias. Skin: Negative for itching and rash. Neurological: Negative for dizziness, tingling and headaches. Psychiatric/Behavioral: Negative for depression. The patient is not nervous/anxious. Past Medical History:   Diagnosis Date    Cancer ([facility] [address] 8/2001    skin ca; chest     Degenerative arthritis of lumbar spine 9/26/2011    Depression 9/26/2011    Glaucoma     Hypertension     Menopause     Osteoporosis     Scoliosis     Vitamin D deficiency      Social History   Substance Use Topics    Smoking status: Never Smoker    Smokeless tobacco: Never Used    Alcohol use No     Current Outpatient Prescriptions on File Prior to Visit   Medication Sig Dispense Refill    linaclotide (LINZESS) 72 mcg cap Take 1 Cap by mouth daily.  Indications: Constipation Predominant Irritable Bowel Syndrome 90 Cap 3    LORazepam (ATIVAN) 0.5 mg tablet I tab po q day if needed for anxiety 90 Tab 1    hydrocortisone (ANUCORT-HC) 25 mg supp Insert 1 Suppository into rectum two (2) times a day. 180 Each 0    simethicone (GAS-X) 80 mg chewable tablet Take 80 mg by mouth every six (6) hours as needed for Flatulence.  escitalopram oxalate (LEXAPRO) 10 mg tablet Take 1 Tab by mouth daily. 90 Tab 5    fluticasone (FLONASE) 50 mcg/actuation nasal spray 2 Sprays by Both Nostrils route daily. 1 Bottle 3    lansoprazole (PREVACID) 15 mg capsule 1 bid 180 Cap 5    aspirin delayed-release 81 mg tablet Take 1 Tab by mouth daily. 30 Tab 6    brimonidine (ALPHAGAN) 0.15 % ophthalmic solution Administer 1 Drop to both eyes two (2) times a day. No current facility-administered medications on file prior to visit. Physical Exam   Constitutional: She appears well-developed and well-nourished. No distress. /82 (BP 1 Location: Right arm, BP Patient Position: Sitting)  Pulse 73  Temp 96 °F (35.6 °C) (Oral)   Resp 16  Ht 4' 8\" (1.422 m)  Wt 99 lb (44.9 kg)  SpO2 100%  BMI 22.2 kg/m2     Eyes: EOM are normal. Right eye exhibits no discharge. Left eye exhibits no discharge. No scleral icterus. Neck: Neck supple. Cardiovascular: Normal rate, regular rhythm and normal heart sounds. Exam reveals no gallop and no friction rub. No murmur heard. Pulmonary/Chest: Effort normal and breath sounds normal. No respiratory distress. She has no wheezes. She has no rales. Abdominal: Soft. She exhibits no distension. There is no tenderness. There is no rebound and no guarding. Musculoskeletal: She exhibits no edema or tenderness. Lymphadenopathy:     She has no cervical adenopathy. Neurological: She is alert. She exhibits normal muscle tone. Skin: Skin is warm and dry. Psychiatric: She has a normal mood and affect.      Lab Results   Component Value Date/Time    Sodium 140 08/10/2017 02:04 PM    Potassium 3.9 08/10/2017 02:04 PM    Chloride 103 08/10/2017 02:04 PM CO2 29 08/10/2017 02:04 PM    Anion gap 8 08/10/2017 02:04 PM    Glucose 93 08/10/2017 02:04 PM    BUN 19 (H) 08/10/2017 02:04 PM    Creatinine 0.64 08/10/2017 02:04 PM    BUN/Creatinine ratio 30 (H) 08/10/2017 02:04 PM    GFR est AA >60 08/10/2017 02:04 PM    GFR est non-AA >60 08/10/2017 02:04 PM    Calcium 9.6 08/10/2017 02:04 PM    Bilirubin, total 0.2 08/10/2017 02:04 PM    AST (SGOT) 19 08/10/2017 02:04 PM    Alk. phosphatase 94 08/10/2017 02:04 PM    Protein, total 7.0 08/10/2017 02:04 PM    Albumin 3.6 08/10/2017 02:04 PM    Globulin 3.4 08/10/2017 02:04 PM    A-G Ratio 1.1 08/10/2017 02:04 PM    ALT (SGPT) 23 08/10/2017 02:04 PM     Lab Results   Component Value Date/Time    WBC 8.2 08/10/2017 02:04 PM    HGB 11.8 (L) 08/10/2017 02:04 PM    HCT 37.2 08/10/2017 02:04 PM    PLATELET 522 04/57/6991 02:04 PM    MCV 92.5 08/10/2017 02:04 PM     ASSESSMENT and PLAN    ICD-10-CM ICD-9-CM    1. Other irritable bowel syndrome K58.8 564.1    2. Anxiety F41.9 300.00    Will continue Linzess though not taking every day. Can try using lactulose q day first.   Discussed risks of lorazepam. Can try to decrease to 1/2 tablet to see if this is effective.

## 2018-02-12 NOTE — PROGRESS NOTES
ROOM # 367 [facility] presents today for   Chief Complaint   Patient presents with    Anxiety    Abdominal Pain     bloating       Skyler Jones preferred language for health care discussion is english/other. Is someone accompanying this pt? no    Is the patient using any DME equipment during OV? no    Depression Screening:  PHQ over the last two weeks 12/1/2017 11/10/2017 8/10/2017 5/9/2017 1/30/2017 10/27/2016 6/9/2016   Little interest or pleasure in doing things Not at all Several days Not at all Not at all Not at all Not at all Not at all   Feeling down, depressed or hopeless Not at all Several days Not at all Not at all Not at all Not at all Not at all   Total Score PHQ 2 0 2 0 0 0 0 0       Learning Assessment:  Learning Assessment 4/9/2015 12/18/2013 9/18/2013   PRIMARY LEARNER Patient Patient Patient   HIGHEST LEVEL OF EDUCATION - PRIMARY LEARNER  GRADUATED HIGH SCHOOL OR GED GRADUATED HIGH SCHOOL OR GED -   BARRIERS PRIMARY LEARNER NONE NONE -   [facility]    NEED - No -   LEARNER PREFERENCE PRIMARY DEMONSTRATION DEMONSTRATION READING   LEARNING SPECIAL TOPICS - no -   ANSWERED BY patient patient patient   RELATIONSHIP SELF SELF SELF       Abuse Screening:  Abuse Screening Questionnaire 5/9/2017 1/28/2016 4/9/2015   Do you ever feel afraid of your partner? N N N   Are you in a relationship with someone who physically or mentally threatens you? N N N   Is it safe for you to go home? Alix Brown       Fall Risk  Fall Risk Assessment, last 12 mths 12/1/2017 11/10/2017 8/10/2017 5/9/2017 1/30/2017 10/27/2016 6/9/2016   Able to walk? Yes Yes Yes Yes Yes Yes Yes   Fall in past 12 months? No No No No Yes Yes No   Fall with injury? - - - - Yes No -   Number of falls in past 12 months - - - - 1 1 -   Fall Risk Score - - - - 2 1 -       Health Maintenance reviewed and discussed per provider. Yes    Skyler Jones is due for glaucoma screening.   Please order/place referral if appropriate. Pt currently taking Antiplatelet therapy? no    Advance Directive:  1. Do you have an advance directive in place? Patient Reply: no    2. If not, would you like material regarding how to put one in place? Patient Reply: no    Coordination of Care:  1. Have you been to the ER, urgent care clinic since your last visit? Hospitalized since your last visit? no    2. Have you seen or consulted any other health care providers outside of the [address] [place] [address] since your last visit? Include any pap smears or colon screening. Yes, cardiology and physical therapy    Please see Red banners under Allergies, Med rec, Immunizations to remove outside inquires. All correct information has been verified with patient and added to chart.

## 2018-02-12 NOTE — ACP (ADVANCE CARE PLANNING)
Advance Directive:  1. Do you have an advance directive in place? Patient Reply: no    2. If not, would you like material regarding how to put one in place?  Patient Reply: no

## 2018-02-12 NOTE — PATIENT INSTRUCTIONS
Diet for Irritable Bowel Syndrome: Care Instructions  Your Care Instructions    Irritable bowel syndrome, or IBS, is a problem with the intestines. IBS can cause belly pain, bloating, gas, constipation, and diarrhea. Most people can control their symptoms by changing their diet and easing stress. No specific foods cause everyone with IBS to have symptoms. Doctors don't offer a specific diet to manage symptoms. But many people find that they feel better when they stop eating certain foods. A high-fiber diet may help if you have constipation. Follow-up care is a key part of your treatment and safety. Be sure to make and go to all appointments, and call your doctor if you are having problems. It's also a good idea to know your test results and keep a list of the medicines you take. How can you care for yourself at home? To reduce constipation  · Include fruits, vegetables, beans, and whole grains in your diet each day. These foods are high in fiber. Slowly increase the amount of fiber you eat. This helps you avoid a lot of gas. · Drink plenty of fluids, enough so that your urine is light yellow or clear like water. If you have kidney, heart, or liver disease and have to limit fluids, talk with your doctor before you increase the amount of fluids you drink. · Get some exercise every day. Build up slowly to 30 to 60 minutes a day on 5 or more days of the week. · Take a fiber supplement, such as Citrucel or Metamucil, every day if needed. Read and follow all instructions on the label. · Schedule time each day for a bowel movement. Having a daily routine may help. Take your time and do not strain when having a bowel movement. · Check with your doctor before you increase the amount of fiber in your diet. For some people who have IBS, eating more fiber may make some symptoms worse. This includes bloating. To reduce diarrhea  You may try giving up foods or drinks one at a time to see whether symptoms improve. Limit or avoid the following:  · Alcohol  · Caffeine, which is found in coffee, tea, cola drinks, and chocolate  · Nicotine, from smoking or chewing tobacco  · Gas-producing foods, such as beans, broccoli, cabbage, and apples  · Dairy products that contain lactose (milk sugar), such as ice cream, milk, cheese, and sour cream  · Foods and drinks high in sugar, especially fruit juice, soda, candy, and other packaged sweets (such as cookies)  · Foods high in fat, including farias, sausage, butter, oils, and anything deep-fried  · Sorbitol and xylitol, artificial sweeteners found in some sugarless candies and chewing gum  Keep track of foods  · Some people with IBS use a daily food diary to keep track of what they eat and whether they have any symptoms after eating certain foods. The diary also can be a good way to record what is going on in your life. · Stress plays a role in IBS. So if you are aware that certain stresses bring on symptoms, you can try to reduce those stresses. Keep mealtimes pleasant  · Try to maintain a pleasant environment when you eat. This may reduce stress that can make symptoms likely to occur. · Give yourself plenty of time to eat, rather than eating on the go. Chew your food slowly. Try not to swallow air, which can cause bloating. Where can you learn more? Go to http://tootie-mery.info/. Enter J703 in the search box to learn more about \"Diet for Irritable Bowel Syndrome: Care Instructions. \"  Current as of: May 12, 2017  Content Version: 11.4  © 8593-6778 [employer]. Care instructions adapted under license by [employer] (which disclaims liability or warranty for this information). If you have questions about a medical condition or this instruction, always ask your healthcare professional. [address] any warranty or liability for your use of this information.        Constipation: Care Instructions  Your Care Instructions    Constipation means that you have a hard time passing stools (bowel movements). People pass stools from 3 times a day to once every 3 days. What is normal for you may be different. Constipation may occur with pain in the rectum and cramping. The pain may get worse when you try to pass stools. Sometimes there are small amounts of bright red blood on toilet paper or the surface of stools. This is because of enlarged veins near the rectum (hemorrhoids). A few changes in your diet and lifestyle may help you avoid ongoing constipation. Your doctor may also prescribe medicine to help loosen your stool. Some medicines can cause constipation. These include pain medicines and antidepressants. Tell your doctor about all the medicines you take. Your doctor may want to make a medicine change to ease your symptoms. Follow-up care is a key part of your treatment and safety. Be sure to make and go to all appointments, and call your doctor if you are having problems. It's also a good idea to know your test results and keep a list of the medicines you take. How can you care for yourself at home? · Drink plenty of fluids, enough so that your urine is light yellow or clear like water. If you have kidney, heart, or liver disease and have to limit fluids, talk with your doctor before you increase the amount of fluids you drink. · Include high-fiber foods in your diet each day. These include fruits, vegetables, beans, and whole grains. · Get at least 30 minutes of exercise on most days of the week. Walking is a good choice. You also may want to do other activities, such as running, swimming, cycling, or playing tennis or team sports. · Take a fiber supplement, such as Citrucel or Metamucil, every day. Read and follow all instructions on the label. · Schedule time each day for a bowel movement. A daily routine may help. Take your time having your bowel movement.   · Support your feet with a small step stool when you sit on the toilet. This helps flex your hips and places your pelvis in a squatting position. · Your doctor may recommend an over-the-counter laxative to relieve your constipation. Examples are Milk of Magnesia and MiraLax. Read and follow all instructions on the label. Do not use laxatives on a long-term basis. When should you call for help? Call your doctor now or seek immediate medical care if:  ? · You have new or worse belly pain. ? · You have new or worse nausea or vomiting. ? · You have blood in your stools. ? Watch closely for changes in your health, and be sure to contact your doctor if:  ? · Your constipation is getting worse. ? · You do not get better as expected. Where can you learn more? Go to http://tootie-mery.info/. Enter 21 [phone] in the search box to learn more about \"Constipation: Care Instructions. \"  Current as of: March 20, 2017  Content Version: 11.4  © 1772-1027 [employer]. Care instructions adapted under license by [employer] (which disclaims liability or warranty for this information). If you have questions about a medical condition or this instruction, always ask your healthcare professional. [place] [address] any warranty or liability for your use of this information.

## 2018-05-14 ENCOUNTER — OFFICE VISIT (OUTPATIENT)
Dept: INTERNAL MEDICINE CLINIC | Age: 83
End: 2018-05-14

## 2018-05-14 VITALS
HEART RATE: 73 BPM | TEMPERATURE: 98.5 F | WEIGHT: 99.2 LBS | BODY MASS INDEX: 22.32 KG/M2 | HEIGHT: 56 IN | RESPIRATION RATE: 18 BRPM | DIASTOLIC BLOOD PRESSURE: 74 MMHG | OXYGEN SATURATION: 99 % | SYSTOLIC BLOOD PRESSURE: 150 MMHG

## 2018-05-14 DIAGNOSIS — F41.9 ANXIETY: ICD-10-CM

## 2018-05-14 DIAGNOSIS — I10 ESSENTIAL HYPERTENSION: ICD-10-CM

## 2018-05-14 DIAGNOSIS — M25.50 ARTHRALGIA, UNSPECIFIED JOINT: ICD-10-CM

## 2018-05-14 DIAGNOSIS — K58.8 OTHER IRRITABLE BOWEL SYNDROME: Primary | ICD-10-CM

## 2018-05-14 RX ORDER — ACETAMINOPHEN 500 MG
1000 TABLET ORAL
Qty: 100 TAB | Refills: 3 | Status: SHIPPED | OUTPATIENT
Start: 2018-05-14

## 2018-05-14 RX ORDER — LORAZEPAM 0.5 MG/1
TABLET ORAL
Qty: 90 TAB | Refills: 0 | Status: SHIPPED | OUTPATIENT
Start: 2018-06-14 | End: 2018-11-26 | Stop reason: SDUPTHER

## 2018-05-14 RX ORDER — LATANOPROST 50 UG/ML
SOLUTION/ DROPS OPHTHALMIC
Refills: 1 | COMMUNITY
Start: 2018-04-23

## 2018-05-14 NOTE — PATIENT INSTRUCTIONS
Lorazepam (By mouth)   Lorazepam ([id number]-henry)  Treats anxiety. Brand Name(s): Ativan, LORazepam Intensol   There may be other brand names for this medicine. When This Medicine Should Not Be Used: This medicine is not right for everyone. Do not use it if you had an allergic reaction to lorazepam or similar medicines, or you are pregnant or breastfeeding, or you have acute narrow-angle glaucoma. How to Use This Medicine:   Liquid, Tablet  · Take your medicine as directed. Your dose may need to be changed several times to find what works best for you. · Oral liquid:   ¨ Measure the oral liquid medicine with a marked measuring spoon, oral syringe, or medicine cup. ¨ Mix the medicine with water, juice, soda, applesauce, or pudding. Drink or eat the mixture right away. Do not store it for later use. · This medicine should come with a Medication Guide. Ask your pharmacist for a copy if you do not have one. · Missed dose: Take a dose as soon as you remember. If you are more than 1 hour late, skip the missed dose and wait until it is time for your next dose. Do not use extra medicine to make up for a missed dose. ·   ¨ Oral liquid: Refrigerate the oral liquid. Throw away an opened bottle after 90 days. ¨ Tablets: Store the medicine in a closed container at room temperature, away from heat, moisture, and direct light. Drugs and Foods to Avoid:   Ask your doctor or pharmacist before using any other medicine, including over-the-counter medicines, vitamins, and herbal products. · Some medicines can affect how lorazepam works. Tell your doctor if you are using any of the following:   ¨ Aminophylline, clozapine, probenecid, theophylline, valproate  ¨ Medicine to treat depression or mental health problems  ¨ Medicine to treat seizures  · Do not drink alcohol while you are using this medicine. · Tell your doctor if you use anything else that makes you sleepy.  Some examples are allergy medicine, narcotic pain medicine, and alcohol. Warnings While Using This Medicine:   · It is not safe to take this medicine during pregnancy. It could harm an unborn baby. Tell your doctor right away if you become pregnant. · Tell your doctor if you have kidney disease, liver disease, lung or breathing problems (such as COPD, sleep apnea), or a history of drug or alcohol abuse, depression, or seizures. · This medicine can be habit-forming. Do not use more than your prescribed dose. Call your doctor if you think your medicine is not working. · Do not stop using this medicine suddenly. Your doctor will need to slowly decrease your dose before you stop it completely. · This medicine may make you drowsy. Do not drive or do anything else that could be dangerous until you know how this medicine affects you. · Your doctor will do lab tests at regular visits to check on the effects of this medicine. Keep all appointments. · Keep all medicine out of the reach of children. Never share your medicine with anyone. Possible Side Effects While Using This Medicine:   Call your doctor right away if you notice any of these side effects:  · Allergic reaction: Itching or hives, swelling in your face or hands, swelling or tingling in your mouth or throat, chest tightness, trouble breathing  · Confusion, unusual mood or behavior, thoughts of hurting yourself  · Seizures  · Severe drowsiness or weakness, slow heartbeat, trouble breathing  · Worsening of depression  If you notice these less serious side effects, talk with your doctor:   · Dizziness, clumsiness  If you notice other side effects that you think are caused by this medicine, tell your doctor. Call your doctor for medical advice about side effects. You may report side effects to FDA at 5-644-FDA-2883  © 2017 [address] [employer] Information is for End User's use only and may not be sold, redistributed or otherwise used for commercial purposes.   The above information is an educational aid only. It is not intended as medical advice for individual conditions or treatments. Talk to your doctor, nurse or pharmacist before following any medical regimen to see if it is safe and effective for you.

## 2018-05-14 NOTE — PROGRESS NOTES
HISTORY OF PRESENT ILLNESS  Meryl Tomas is a 80 y.o. female. HPI Comments: 81 yo female here for f/u of HTN, anxiety. C/o aches all over at times. Only taking APAP prn once in awhile. This helps but affect wiil last 2 hours. Constipation - chronic issue. Has not taking linzess but using OTC product. Lorazepam continues to help her anxiety. Taking qhs. Pill is too small for her to cut in half. BP a little high today but overall has been doing okay. Review of Systems   Constitutional: Negative for chills, fever and weight loss. HENT: Negative for congestion and ear pain. Eyes: Negative for blurred vision and pain. Respiratory: Negative for cough and shortness of breath. Cardiovascular: Negative for chest pain, palpitations and leg swelling. Gastrointestinal: Negative for nausea and vomiting. Genitourinary: Negative for frequency and urgency. Musculoskeletal: Positive for joint pain and myalgias. Skin: Negative for itching and rash. Neurological: Negative for dizziness, tingling and headaches. Psychiatric/Behavioral: Negative for depression. The patient is nervous/anxious. Past Medical History:   Diagnosis Date    Cancer [facility]) 8/2001    skin ca; chest     Degenerative arthritis of lumbar spine 9/26/2011    Depression 9/26/2011    Glaucoma     Hypertension     Menopause     Osteoporosis     Scoliosis     Vitamin D deficiency      Current Outpatient Prescriptions on File Prior to Visit   Medication Sig Dispense Refill    hydrocortisone (ANUCORT-HC) 25 mg supp Insert 1 Suppository into rectum two (2) times a day. 180 Each 0    LORazepam (ATIVAN) 0.5 mg tablet I tab po q day if needed for anxiety 90 Tab 1    aspirin delayed-release 81 mg tablet Take 1 Tab by mouth daily. 30 Tab 6    simethicone (GAS-X) 80 mg chewable tablet Take 80 mg by mouth every six (6) hours as needed for Flatulence.  escitalopram oxalate (LEXAPRO) 10 mg tablet Take 1 Tab by mouth daily.  80 Tab 5    fluticasone (FLONASE) 50 mcg/actuation nasal spray 2 Sprays by Both Nostrils route daily. 1 Bottle 3    lansoprazole (PREVACID) 15 mg capsule 1 bid 180 Cap 5    lactulose (CHRONULAC) 10 gram/15 mL solution Take 15 mL by mouth daily. 473 mL 2    linaclotide (LINZESS) 72 mcg cap Take 1 Cap by mouth daily. Indications: Constipation Predominant Irritable Bowel Syndrome 90 Cap 3    brimonidine (ALPHAGAN) 0.15 % ophthalmic solution Administer 1 Drop to both eyes two (2) times a day. No current facility-administered medications on file prior to visit. Social History   Substance Use Topics    Smoking status: Never Smoker    Smokeless tobacco: Never Used    Alcohol use No     Physical Exam   Constitutional: She appears well-developed and well-nourished. No distress. /74 (BP 1 Location: Right arm, BP Patient Position: Sitting)  Pulse 73  Temp 98.5 °F (36.9 °C) (Oral)   Resp 18  Ht 4' 8\" (1.422 m)  Wt 99 lb 3.2 oz (45 kg)  SpO2 99%  BMI 22.24 kg/m2     Eyes: EOM are normal. Right eye exhibits no discharge. Left eye exhibits no discharge. No scleral icterus. Neck: Neck supple. Cardiovascular: Normal rate, regular rhythm and normal heart sounds. Exam reveals no gallop and no friction rub. No murmur heard. Pulmonary/Chest: Effort normal and breath sounds normal. No respiratory distress. She has no wheezes. She has no rales. Abdominal: Soft. She exhibits no distension. There is no tenderness. There is no rebound and no guarding. Musculoskeletal: She exhibits no edema or tenderness. Lymphadenopathy:     She has no cervical adenopathy. Neurological: She is alert. She exhibits normal muscle tone. Skin: Skin is warm and dry. Psychiatric: She has a normal mood and affect.      Lab Results   Component Value Date/Time    Sodium 140 08/10/2017 02:04 PM    Potassium 3.9 08/10/2017 02:04 PM    Chloride 103 08/10/2017 02:04 PM    CO2 29 08/10/2017 02:04 PM    Anion gap 8 08/10/2017 02:04 PM    Glucose 93 08/10/2017 02:04 PM    BUN 19 (H) 08/10/2017 02:04 PM    Creatinine 0.64 08/10/2017 02:04 PM    BUN/Creatinine ratio 30 (H) 08/10/2017 02:04 PM    GFR est AA >60 08/10/2017 02:04 PM    GFR est non-AA >60 08/10/2017 02:04 PM    Calcium 9.6 08/10/2017 02:04 PM    Bilirubin, total 0.2 08/10/2017 02:04 PM    AST (SGOT) 19 08/10/2017 02:04 PM    Alk. phosphatase 94 08/10/2017 02:04 PM    Protein, total 7.0 08/10/2017 02:04 PM    Albumin 3.6 08/10/2017 02:04 PM    Globulin 3.4 08/10/2017 02:04 PM    A-G Ratio 1.1 08/10/2017 02:04 PM    ALT (SGPT) 23 08/10/2017 02:04 PM       ASSESSMENT and PLAN    ICD-10-CM ICD-9-CM    1. Other irritable bowel syndrome K58.8 564.1    2. Anxiety F41.9 300.00 LORazepam (ATIVAN) 0.5 mg tablet   3. Essential hypertension I10 401.9    4. Arthralgia, unspecified joint M25.50 719.40      Discussed that she can take Linzess and lactulose if needed. Continue to recommend minimizing use of lorazepam.   Discussed trying scheduled tylenol for pain.

## 2018-05-14 NOTE — MR AVS SNAPSHOT
[address] [facility] [address] [id number] 
[phone] Patient: Clarence Padron MRN: [id number] QVN:9[birth year]9/[birth year] Visit Information Date & Time Provider Department Dept. Phone Encounter #  
 5/14/2018  1:45 PM [phone] Follow-up Instructions Return in about 3 months (around 8/14/2018), or if symptoms worsen or fail to improve. Upcoming Health Maintenance Date Due  
 GLAUCOMA SCREENING Q2Y 10/21/2017 MEDICARE YEARLY EXAM 5/10/2018 Influenza Age 5 to Adult 8/1/2018 DTaP/Tdap/Td series (2 - Td) 8/10/2026 Allergies as of 5/14/2018  Review Complete On: 5/14/2018 By: Pallavi Watson Severity Noted Reaction Type Reactions Flexeril [Cyclobenzaprine]  09/26/2011   Not Verified Unknown (comments)  
 unknown Pcn [Penicillins]  09/26/2011   Topical Rash Current Immunizations  Reviewed on 10/23/2012 Name Date Influenza High Dose Vaccine PF 10/27/2016, 9/30/2015 Influenza Vaccine Split 10/23/2012, 10/25/2011 Pneumococcal Conjugate (PCV-13) 10/28/2015 Tdap 8/10/2016 Not reviewed this visit You Were Diagnosed With   
  
 Codes Comments Other irritable bowel syndrome    -  Primary ICD-10-CM: K58.8 ICD-9-CM: 134.7 Anxiety     ICD-10-CM: F41.9 ICD-9-CM: 300.00 Essential hypertension     ICD-10-CM: I10 
ICD-9-CM: 401.9 Arthralgia, unspecified joint     ICD-10-CM: M25.50 ICD-9-CM: 719.40 Vitals BP Pulse Temp Resp Height(growth percentile) Weight(growth percentile) 150/74 (BP 1 Location: Right arm, BP Patient Position: Sitting) 73 98.5 °F (36.9 °C) (Oral) 18 4' 8\" (1.422 m) 99 lb 3.2 oz (45 kg) SpO2 BMI OB Status Smoking Status 99% 22.24 kg/m2 Postmenopausal Never Smoker Vitals History BMI and BSA Data  Body Mass Index Body Surface Area  
 22.24 kg/m 2 1.33 m 2  
  
  
 Preferred Pharmacy Pharmacy Name Phone [facility] [address]  [facility] [phone] Your Updated Medication List  
  
   
This list is accurate as of 5/14/18  2:13 PM.  Always use your most recent med list.  
  
  
  
  
 acetaminophen 500 mg tablet Commonly known as:  TYLENOL Take 2 Tabs by mouth three (3) times daily as needed for Pain. Indications: Pain  
  
 aspirin delayed-release 81 mg tablet Take 1 Tab by mouth daily. brimonidine 0.15 % ophthalmic solution Commonly known as:  Ekta Ricardo Administer 1 Drop to both eyes two (2) times a day. escitalopram oxalate 10 mg tablet Commonly known as:  Levorn Allegra Take 1 Tab by mouth daily. fluticasone 50 mcg/actuation nasal spray Commonly known as:  Nesha Shirley 2 Sprays by Both Nostrils route daily. GAS-X 80 mg chewable tablet Generic drug:  simethicone Take 80 mg by mouth every six (6) hours as needed for Flatulence. hydrocortisone 25 mg Supp Commonly known as:  ANUCORT-HC Insert 1 Suppository into rectum two (2) times a day. lactulose 10 gram/15 mL solution Commonly known as:  Ely Cypher Take 15 mL by mouth daily. lansoprazole 15 mg capsule Commonly known as:  PREVACID  
1 bid  
  
 latanoprost 0.005 % ophthalmic solution Commonly known as:  XALATAN  
place 1 drop into both eyes at bedtime  
  
 linaclotide 72 mcg Cap Commonly known as:  Champ Werner Take 1 Cap by mouth daily. Indications: Constipation Predominant Irritable Bowel Syndrome LORazepam 0.5 mg tablet Commonly known as:  ATIVAN I tab po q day if needed for anxiety  Indications: anxiety Start taking on:  6/14/2018 Prescriptions Printed Refills LORazepam (ATIVAN) 0.5 mg tablet 0 Starting on: 6/14/2018 Sig: I tab po q day if needed for anxiety  Indications: anxiety Class: Print Prescriptions Sent to Pharmacy Refills acetaminophen (TYLENOL) 500 mg tablet 3 Sig: Take 2 Tabs by mouth three (3) times daily as needed for Pain. Indications: Pain Class: Normal  
 Pharmacy: [facility] [address] [facility] #: [phone] Route: Oral  
  
Follow-up Instructions Return in about 3 months (around 8/14/2018), or if symptoms worsen or fail to improve. Patient Instructions Lorazepam (By mouth) Lorazepam (fgi-YF-s-henry) Treats anxiety. Brand Name(s): Ativan, LORazepam Intensol There may be other brand names for this medicine. When This Medicine Should Not Be Used: This medicine is not right for everyone. Do not use it if you had an allergic reaction to lorazepam or similar medicines, or you are pregnant or breastfeeding, or you have acute narrow-angle glaucoma. How to Use This Medicine:  
Liquid, Tablet · Take your medicine as directed. Your dose may need to be changed several times to find what works best for you. · Oral liquid: ¨ Measure the oral liquid medicine with a marked measuring spoon, oral syringe, or medicine cup. ¨ Mix the medicine with water, juice, soda, applesauce, or pudding. Drink or eat the mixture right away. Do not store it for later use. · This medicine should come with a Medication Guide. Ask your pharmacist for a copy if you do not have one. · Missed dose: Take a dose as soon as you remember. If you are more than 1 hour late, skip the missed dose and wait until it is time for your next dose. Do not use extra medicine to make up for a missed dose. ·  
¨ Oral liquid: Refrigerate the oral liquid. Throw away an opened bottle after 90 days. ¨ Tablets: Store the medicine in a closed container at room temperature, away from heat, moisture, and direct light. Drugs and Foods to Avoid: Ask your doctor or pharmacist before using any other medicine, including over-the-counter medicines, vitamins, and herbal products. · Some medicines can affect how lorazepam works. Tell your doctor if you are using any of the following: ¨ Aminophylline, clozapine, probenecid, theophylline, valproate ¨ Medicine to treat depression or mental health problems ¨ Medicine to treat seizures · Do not drink alcohol while you are using this medicine. · Tell your doctor if you use anything else that makes you sleepy. Some examples are allergy medicine, narcotic pain medicine, and alcohol. Warnings While Using This Medicine: · It is not safe to take this medicine during pregnancy. It could harm an unborn baby. Tell your doctor right away if you become pregnant. · Tell your doctor if you have kidney disease, liver disease, lung or breathing problems (such as COPD, sleep apnea), or a history of drug or alcohol abuse, depression, or seizures. · This medicine can be habit-forming. Do not use more than your prescribed dose. Call your doctor if you think your medicine is not working. · Do not stop using this medicine suddenly. Your doctor will need to slowly decrease your dose before you stop it completely. · This medicine may make you drowsy. Do not drive or do anything else that could be dangerous until you know how this medicine affects you. · Your doctor will do lab tests at regular visits to check on the effects of this medicine. Keep all appointments. · Keep all medicine out of the reach of children. Never share your medicine with anyone. Possible Side Effects While Using This Medicine:  
Call your doctor right away if you notice any of these side effects: · Allergic reaction: Itching or hives, swelling in your face or hands, swelling or tingling in your mouth or throat, chest tightness, trouble breathing · Confusion, unusual mood or behavior, thoughts of hurting yourself · Seizures · Severe drowsiness or weakness, slow heartbeat, trouble breathing · Worsening of depression If you notice these less serious side effects, talk with your doctor: · Dizziness, clumsiness If you notice other side effects that you think are caused by this medicine, tell your doctor. Call your doctor for medical advice about side effects. You may report side effects to FDA at 1-982-VZB-3499 © 2017 [facility] Information is for End User's use only and may not be sold, redistributed or otherwise used for commercial purposes. The above information is an  only. It is not intended as medical advice for individual conditions or treatments. Talk to your doctor, nurse or pharmacist before following any medical regimen to see if it is safe and effective for you. Introducing [facility] [place] [facility]! [facility] introduces [employer] patient portal. Now you can access parts of your medical record, email your doctor's office, and request medication refills online. 1. In your internet browser, go to [email or web address] 2. Click on the First Time User? Click Here link in the Sign In box. You will see the New Member Sign Up page. 3. Enter your Nex[employer] Access Code exactly as it appears below. You will not need to use this code after youve completed the sign-up process. If you do not sign up before the expiration date, you must request a new code. · Nex[employer] Access Code: [email or web address] Expires: 8/12/2018  2:13 PM 
 
4. Enter the last four digits of your Social Security Number (xxxx) and Date of Birth (mm/dd/yyyy) as indicated and click Submit. You will be taken to the next sign-up page. 5. Create a DataNitrot ID. This will be your Nexant login ID and cannot be changed, so think of one that is secure and easy to remember. 6. Create a Nexant password. You can change your password at any time. 7. Enter your Password Reset Question and Answer. This can be used at a later time if you forget your password. 8. Enter your e-mail address. You will receive e-mail notification when new information is available in [address] 9. Click Sign Up. You can now view and download portions of your medical record. 10. Click the Download Summary menu link to download a portable copy of your medical information. If you have questions, please visit the Frequently Asked Questions section of the [employer] website. Remember, [employer] is NOT to be used for urgent needs. For medical emergencies, dial 911. Now available from your iPhone and Android! Please provide this summary of care documentation to your next provider. Your primary care clinician is listed as Cydney Gutierrez. If you have any questions after today's visit, please call [phone].

## 2018-05-14 NOTE — PROGRESS NOTES
ROOM # [id number]  Identified pt with two pt identifiers(name and ). Reviewed record in preparation for visit and have obtained necessary documentation. Chief Complaint   Patient presents with    Follow-up     ibs,abd pain anxieyt    Osteoporosis      Abdi Davis preferred language for health care discussion is english/other. Is the patient using any DME equipment during OV? Nuha Thapa is due for:  Health Maintenance Due   Topic    GLAUCOMA SCREENING Q2Y     MEDICARE [facility] Maintenance reviewed and discussed per provider  Please order/place referral if appropriate. Advance Directive:  1. Do you have an advance directive in place? Patient Reply: NO    2. If not, would you like material regarding how to put one in place? NO    Coordination of Care:  1. Have you been to the ER, urgent care clinic since your last visit? Hospitalized since your last visit? NO    2. Have you seen or consulted any other health care providers outside of the [facility] since your last visit? Include any pap smears or colon screening. NO    Patient is accompanied by self and son I have received verbal consent from Abdi Davis to discuss any/all medical information while they are present in the room.     Learning Assessment:  Learning Assessment 2013   PRIMARY LEARNER Patient Patient Patient   HIGHEST LEVEL OF EDUCATION - PRIMARY LEARNER  GRADUATED HIGH SCHOOL OR GED GRADUATED HIGH SCHOOL OR GED -   BARRIERS PRIMARY LEARNER NONE NONE -   PRIMARY LANGUAGE ENGLISH ENGLISH ENGLISH    NEED - No -   LEARNER PREFERENCE PRIMARY DEMONSTRATION DEMONSTRATION READING   LEARNING SPECIAL TOPICS - no -   ANSWERED BY patient patient patient   RELATIONSHIP SELF SELF SELF     Depression Screening:  PHQ over the last two weeks 2018 2017 11/10/2017 8/10/2017 2017 2017 10/27/2016   Little interest or pleasure in doing things Not at all Not at all Several days Not at all Not at all Not at all Not at all   Feeling down, depressed or hopeless Not at all Not at all Several days Not at all Not at all Not at all Not at all   Total Score PHQ 2 0 0 2 0 0 0 0     Abuse Screening:  Abuse Screening Questionnaire 5/9/2017 1/28/2016 4/9/2015   Do you ever feel afraid of your partner? N N N   Are you in a relationship with someone who physically or mentally threatens you? N N N   Is it safe for you to go home? Buffy Harper     Fall Risk  Fall Risk Assessment, last 12 mths 5/14/2018 12/1/2017 11/10/2017 8/10/2017 5/9/2017 1/30/2017 10/27/2016   Able to walk? Yes Yes Yes Yes Yes Yes Yes   Fall in past 12 months?  No No No No No Yes Yes   Fall with injury? - - - - - Yes No   Number of falls in past 12 months - - - - - 1 1   Fall Risk Score - - - - - 2 1

## 2018-06-20 RX ORDER — LACTULOSE 10 G/15ML
10 SOLUTION ORAL; RECTAL DAILY
Qty: 473 ML | Refills: 2 | Status: SHIPPED | OUTPATIENT
Start: 2018-06-20 | End: 2020-03-17 | Stop reason: SDUPTHER

## 2018-06-20 NOTE — TELEPHONE ENCOUNTER
Requested Prescriptions     Pending Prescriptions Disp Refills    lactulose (CHRONULAC) 10 gram/15 mL solution 473 mL 2     Sig: Take 15 mL by mouth daily.

## 2018-08-21 ENCOUNTER — HOSPITAL ENCOUNTER (OUTPATIENT)
Dept: LAB | Age: 83
Discharge: HOME | End: 2018-08-21
Payer: MEDICARE

## 2018-08-21 ENCOUNTER — OFFICE VISIT (OUTPATIENT)
Dept: INTERNAL MEDICINE CLINIC | Age: 83
End: 2018-08-21

## 2018-08-21 VITALS
HEART RATE: 68 BPM | WEIGHT: 97.2 LBS | DIASTOLIC BLOOD PRESSURE: 81 MMHG | OXYGEN SATURATION: 99 % | TEMPERATURE: 96.8 F | HEIGHT: 56 IN | RESPIRATION RATE: 16 BRPM | BODY MASS INDEX: 21.87 KG/M2 | SYSTOLIC BLOOD PRESSURE: 175 MMHG

## 2018-08-21 DIAGNOSIS — I10 ESSENTIAL HYPERTENSION: ICD-10-CM

## 2018-08-21 DIAGNOSIS — K58.8 OTHER IRRITABLE BOWEL SYNDROME: ICD-10-CM

## 2018-08-21 DIAGNOSIS — F41.9 ANXIETY: ICD-10-CM

## 2018-08-21 DIAGNOSIS — Z00.00 MEDICARE ANNUAL WELLNESS VISIT, SUBSEQUENT: Primary | ICD-10-CM

## 2018-08-21 PROBLEM — F32.A MILD DEPRESSION: Status: ACTIVE | Noted: 2018-08-21

## 2018-08-21 LAB
ALBUMIN SERPL-MCNC: 3.9 G/DL (ref 3.4–5)
ALBUMIN/GLOB SERPL: 1.2 {RATIO} (ref 0.8–1.7)
ALP SERPL-CCNC: 101 U/L (ref 45–117)
ALT SERPL-CCNC: 25 U/L (ref 13–56)
ANION GAP SERPL CALC-SCNC: 7 MMOL/L (ref 3–18)
APPEARANCE UR: CLEAR
AST SERPL-CCNC: 23 U/L (ref 15–37)
BACTERIA URNS QL MICRO: NEGATIVE /HPF
BASOPHILS # BLD: 0.1 K/UL (ref 0–0.1)
BASOPHILS NFR BLD: 1 % (ref 0–2)
BILIRUB SERPL-MCNC: 0.2 MG/DL (ref 0.2–1)
BILIRUB UR QL: NEGATIVE
BUN SERPL-MCNC: 16 MG/DL (ref 7–18)
BUN/CREAT SERPL: 28 (ref 12–20)
CALCIUM SERPL-MCNC: 9.5 MG/DL (ref 8.5–10.1)
CHLORIDE SERPL-SCNC: 104 MMOL/L (ref 100–108)
CHOLEST SERPL-MCNC: 188 MG/DL
CO2 SERPL-SCNC: 30 MMOL/L (ref 21–32)
COLOR UR: YELLOW
CREAT SERPL-MCNC: 0.58 MG/DL (ref 0.6–1.3)
DIFFERENTIAL METHOD BLD: ABNORMAL
EOSINOPHIL # BLD: 0.6 K/UL (ref 0–0.4)
EOSINOPHIL NFR BLD: 7 % (ref 0–5)
EPITH CASTS URNS QL MICRO: NORMAL /LPF (ref 0–5)
ERYTHROCYTE [DISTWIDTH] IN BLOOD BY AUTOMATED COUNT: 13.4 % (ref 11.6–14.5)
GLOBULIN SER CALC-MCNC: 3.3 G/DL (ref 2–4)
GLUCOSE SERPL-MCNC: 96 MG/DL (ref 74–99)
GLUCOSE UR STRIP.AUTO-MCNC: NEGATIVE MG/DL
HCT VFR BLD AUTO: 38.7 % (ref 35–45)
HDLC SERPL-MCNC: 65 MG/DL (ref 40–60)
HDLC SERPL: 2.9 {RATIO} (ref 0–5)
HGB BLD-MCNC: 12.5 G/DL (ref 12–16)
HGB UR QL STRIP: ABNORMAL
KETONES UR QL STRIP.AUTO: NEGATIVE MG/DL
LDLC SERPL CALC-MCNC: 98.6 MG/DL (ref 0–100)
LEUKOCYTE ESTERASE UR QL STRIP.AUTO: NEGATIVE
LIPID PROFILE,FLP: ABNORMAL
LYMPHOCYTES # BLD: 2.1 K/UL (ref 0.9–3.6)
LYMPHOCYTES NFR BLD: 25 % (ref 21–52)
MCH RBC QN AUTO: 29.9 PG (ref 24–34)
MCHC RBC AUTO-ENTMCNC: 32.3 G/DL (ref 31–37)
MCV RBC AUTO: 92.6 FL (ref 74–97)
MONOCYTES # BLD: 0.7 K/UL (ref 0.05–1.2)
MONOCYTES NFR BLD: 8 % (ref 3–10)
NEUTS SEG # BLD: 4.7 K/UL (ref 1.8–8)
NEUTS SEG NFR BLD: 59 % (ref 40–73)
NITRITE UR QL STRIP.AUTO: NEGATIVE
PH UR STRIP: 8 [PH] (ref 5–8)
PLATELET # BLD AUTO: 249 K/UL (ref 135–420)
PMV BLD AUTO: 10 FL (ref 9.2–11.8)
POTASSIUM SERPL-SCNC: 4.6 MMOL/L (ref 3.5–5.5)
PROT SERPL-MCNC: 7.2 G/DL (ref 6.4–8.2)
PROT UR STRIP-MCNC: NEGATIVE MG/DL
RBC # BLD AUTO: 4.18 M/UL (ref 4.2–5.3)
RBC #/AREA URNS HPF: NORMAL /HPF (ref 0–5)
SODIUM SERPL-SCNC: 141 MMOL/L (ref 136–145)
SP GR UR REFRACTOMETRY: 1.01 (ref 1–1.03)
TRIGL SERPL-MCNC: 122 MG/DL (ref ?–150)
TSH SERPL DL<=0.05 MIU/L-ACNC: 1.5 UIU/ML (ref 0.36–3.74)
UROBILINOGEN UR QL STRIP.AUTO: 0.2 EU/DL (ref 0.2–1)
VLDLC SERPL CALC-MCNC: 24.4 MG/DL
WBC # BLD AUTO: 8.1 K/UL (ref 4.6–13.2)
WBC URNS QL MICRO: NORMAL /HPF (ref 0–4)

## 2018-08-21 PROCEDURE — 80053 COMPREHEN METABOLIC PANEL: CPT | Performed by: INTERNAL MEDICINE

## 2018-08-21 PROCEDURE — 85025 COMPLETE CBC W/AUTO DIFF WBC: CPT | Performed by: INTERNAL MEDICINE

## 2018-08-21 PROCEDURE — 36415 COLL VENOUS BLD VENIPUNCTURE: CPT | Performed by: INTERNAL MEDICINE

## 2018-08-21 PROCEDURE — 81001 URINALYSIS AUTO W/SCOPE: CPT | Performed by: INTERNAL MEDICINE

## 2018-08-21 PROCEDURE — 84443 ASSAY THYROID STIM HORMONE: CPT | Performed by: INTERNAL MEDICINE

## 2018-08-21 PROCEDURE — 80061 LIPID PANEL: CPT | Performed by: INTERNAL MEDICINE

## 2018-08-21 RX ORDER — HYDROCORTISONE ACETATE 25 MG/1
25 SUPPOSITORY RECTAL 2 TIMES DAILY
Qty: 180 EACH | Refills: 0 | Status: SHIPPED | OUTPATIENT
Start: 2018-08-21 | End: 2019-02-25 | Stop reason: SDUPTHER

## 2018-08-21 RX ORDER — LISINOPRIL 5 MG/1
5 TABLET ORAL DAILY
Qty: 90 TAB | Refills: 3 | Status: SHIPPED | OUTPATIENT
Start: 2018-08-21 | End: 2019-05-29 | Stop reason: DRUGHIGH

## 2018-08-21 RX ORDER — ESCITALOPRAM OXALATE 10 MG/1
10 TABLET ORAL DAILY
Qty: 90 TAB | Refills: 5 | Status: SHIPPED | OUTPATIENT
Start: 2018-08-21 | End: 2019-05-29 | Stop reason: SDUPTHER

## 2018-08-21 NOTE — MR AVS SNAPSHOT
[address] [place] [address] 
 
 
 [address] [facility] [address] 
[phone] Patient: Moses Bales MRN: [id number] Visit Information Date & Time Provider Department Dept. Phone Encounter #  
 8/21/2018  1:45 PM Jesenia Mack, [phone] Follow-up Instructions Return in about 3 months (around 11/21/2018), or if symptoms worsen or fail to improve, for hypertension. Upcoming Health Maintenance Date Due  
 GLAUCOMA SCREENING Q2Y 10/21/2017 Influenza Age 5 to Adult 8/1/2018 MEDICARE YEARLY EXAM 8/22/2019 DTaP/Tdap/Td series (2 - Td) 8/10/2026 Allergies as of 8/21/2018  Review Complete On: 8/21/2018 By: Frances Moore Severity Noted Reaction Type Reactions Flexeril [Cyclobenzaprine]  09/26/2011   Not Verified Unknown (comments)  
 unknown Pcn [Penicillins]  09/26/2011   Topical Rash Current Immunizations  Reviewed on 8/20/2018 Name Date Influenza High Dose Vaccine PF 10/27/2016, 9/30/2015 Influenza Vaccine (Tri) Adjuvanted 10/4/2017 Influenza Vaccine Split 10/23/2012, 10/25/2011 Pneumococcal Conjugate (PCV-13) 10/28/2015 Tdap 8/10/2016 12:00 AM  
 Zoster Vaccine, Live 6/3/2017 Not reviewed this visit You Were Diagnosed With   
  
 Codes Comments Medicare annual wellness visit, subsequent    -  Primary ICD-10-CM: Z00.00 ICD-9-CM: V70.0 Other irritable bowel syndrome     ICD-10-CM: K58.8 ICD-9-CM: 606.1 Anxiety     ICD-10-CM: F41.9 ICD-9-CM: 300.00 Essential hypertension     ICD-10-CM: I10 
ICD-9-CM: 401.9 Vitals BP Pulse Temp Resp Height(growth percentile) Weight(growth percentile) 175/81 (BP 1 Location: Right arm, BP Patient Position: Sitting) 68 96.8 °F (36 °C) (Oral) 16 4' 8\" (1.422 m) 97 lb 3.2 oz (44.1 kg) SpO2 BMI OB Status Smoking Status 99% 21.79 kg/m2 Postmenopausal Never Smoker Vitals History BMI and BSA Data Body Mass Index Body Surface Area 21.79 kg/m 2 1.32 m 2 Preferred Pharmacy Pharmacy Name Phone  [address] [phone] Your Updated Medication List  
  
   
This list is accurate as of 8/21/18  2:14 PM.  Always use your most recent med list.  
  
  
  
  
 acetaminophen 500 mg tablet Commonly known as:  TYLENOL Take 2 Tabs by mouth three (3) times daily as needed for Pain. Indications: Pain  
  
 aspirin delayed-release 81 mg tablet Take 1 Tab by mouth daily. escitalopram oxalate 10 mg tablet Commonly known as:  Bon Chavez Take 1 Tab by mouth daily. fluticasone 50 mcg/actuation nasal spray Commonly known as:  Gerson Callahan 2 Sprays by Both Nostrils route daily. GAS-X 80 mg chewable tablet Generic drug:  simethicone Take 80 mg by mouth every six (6) hours as needed for Flatulence. hydrocortisone 25 mg Supp Commonly known as:  ANUCORT-HC Insert 1 Suppository into rectum two (2) times a day. lactulose 10 gram/15 mL solution Commonly known as:  Beckey Jaqui Take 15 mL by mouth daily. lansoprazole 15 mg capsule Commonly known as:  PREVACID  
1 bid  
  
 latanoprost 0.005 % ophthalmic solution Commonly known as:  XALATAN  
place 1 drop into both eyes at bedtime  
  
 linaclotide 72 mcg Cap Commonly known as:  Reyna Padilladi Take 1 Cap by mouth daily. Indications: Constipation Predominant Irritable Bowel Syndrome  
  
 lisinopril 5 mg tablet Commonly known as:  Tori Loss Take 1 Tab by mouth daily. Indications: hypertension LORazepam 0.5 mg tablet Commonly known as:  ATIVAN I tab po q day if needed for anxiety  Indications: anxiety Prescriptions Sent to Pharmacy Refills  
 hydrocortisone (ANUCORT-HC) 25 mg supp 0 Sig: Insert 1 Suppository into rectum two (2) times a day.   
 Class: Normal  
 Pharmacy: [facility] Trudi Gutierrez Ph #: [phone] Route: Rectal  
 escitalopram oxalate (LEXAPRO) 10 mg tablet 5 Sig: Take 1 Tab by mouth daily. Class: Normal  
 Pharmacy:  [address] Ph #: [phone] Route: Oral  
 lisinopril (PRINIVIL, ZESTRIL) 5 mg tablet 3 Sig: Take 1 Tab by mouth daily. Indications: hypertension Class: Normal  
 Pharmacy:  [address] Ph #: [phone] Route: Oral  
  
Follow-up Instructions Return in about 3 months (around 11/21/2018), or if symptoms worsen or fail to improve, for hypertension. To-Do List   
 08/21/2018 Lab:  CBC WITH AUTOMATED DIFF   
  
 08/21/2018 Lab:  LIPID PANEL   
  
 08/21/2018 Lab:  METABOLIC PANEL, COMPREHENSIVE   
  
 08/21/2018 Lab:  TSH 3RD GENERATION   
  
 08/21/2018 Lab:  URINALYSIS W/ RFLX MICROSCOPIC Patient Instructions Medicare Wellness Visit, Female The best way to live healthy is to have a lifestyle where you eat a well-balanced diet, exercise regularly, limit alcohol use, and quit all forms of tobacco/nicotine, if applicable. Regular preventive services are another way to keep healthy. Preventive services (vaccines, screening tests, monitoring & exams) can help personalize your care plan, which helps you manage your own care. Screening tests can find health problems at the earliest stages, when they are easiest to treat. [facility] follows the current, evidence-based guidelines published by the [facility] Terrell Albert (USPSTF) when recommending preventive services for our patients. Because we follow these guidelines, sometimes recommendations change over time as research supports it. (For example, mammograms used to be recommended annually.  Even though Medicare will still pay for an annual mammogram, the newer guidelines recommend a mammogram every two years for women of average risk.) Of course, you and your doctor may decide to screen more often for some diseases, based on your risk and your health status. Preventive services for you include: - Medicare offers their members a free annual wellness visit, which is time for you and your primary care provider to discuss and plan for your preventive service needs. Take advantage of this benefit every year! 
-All adults over the age of 72 should receive the recommended pneumonia vaccines. Current USPSTF guidelines recommend a series of two vaccines for the best pneumonia protection.  
-All adults should have a flu vaccine yearly and a tetanus vaccine every 10 years. All adults age 61 and older should receive a shingles vaccine once in their lifetime.   
-A bone mass density test is recommended when a woman turns 65 to screen for osteoporosis. This test is only recommended one time, as a screening. Some providers will use this same test as a disease monitoring tool if you already have osteoporosis. -All adults age 38-68 who are overweight should have a diabetes screening test once every three years.  
-Other screening tests and preventive services for persons with diabetes include: an eye exam to screen for diabetic retinopathy, a kidney function test, a foot exam, and stricter control over your cholesterol.  
-Cardiovascular screening for adults with routine risk involves an electrocardiogram (ECG) at intervals determined by your doctor.  
-Colorectal cancer screenings should be done for adults age 54-65 with no increased risk factors for colorectal cancer. There are a number of acceptable methods of screening for this type of cancer. Each test has its own benefits and drawbacks. Discuss with your doctor what is most appropriate for you during your annual wellness visit.  The different tests include: colonoscopy (considered the best screening method), a fecal occult blood test, a fecal DNA test, and sigmoidoscopy. -Breast cancer screenings are recommended every other year for women of normal risk, age 54-69. 
-Cervical cancer screenings for women over age 72 are only recommended with certain risk factors.  
-All adults born between [facility] should be screened once for Hepatitis C. Here is a list of your current Health Maintenance items (your personalized list of preventive services) with a due date: 
Health Maintenance Due Topic Date Due  Glaucoma Screening   10/21/2017 [address] [place] [address] Annual Well Visit  05/10/2018  Flu Vaccine  08/01/2018 Diarrhea: Care Instructions Your Care Instructions Diarrhea is loose, watery stools (bowel movements). The exact cause is often hard to find. Sometimes diarrhea is your body's way of getting rid of what caused an upset stomach. Viruses, food poisoning, and many medicines can cause diarrhea. Some people get diarrhea in response to emotional stress, anxiety, or certain foods. Almost everyone has diarrhea now and then. It usually isn't serious, and your stools will return to normal soon. The important thing to do is replace the fluids you have lost, so you can prevent dehydration. The doctor has checked you carefully, but problems can develop later. If you notice any problems or new symptoms, get medical treatment right away. Follow-up care is a key part of your treatment and safety. Be sure to make and go to all appointments, and call your doctor if you are having problems. It's also a good idea to know your test results and keep a list of the medicines you take. How can you care for yourself at home? · Watch for signs of dehydration, which means your body has lost too much water. Dehydration is a serious condition and should be treated right away. Signs of dehydration are: 
¨ Increasing thirst and dry eyes and mouth. ¨ Feeling faint or lightheaded.  
¨ Darker urine, and a smaller amount of urine than normal. 
 · To prevent dehydration, drink plenty of fluids, enough so that your urine is light yellow or clear like water. Choose water and other caffeine-free clear liquids until you feel better. If you have kidney, heart, or liver disease and have to limit fluids, talk with your doctor before you increase the amount of fluids you drink. · Begin eating small amounts of mild foods the next day, if you feel like it. ¨ Try yogurt that has live cultures of Lactobacillus. (Check the label.) ¨ Avoid spicy foods, fruits, alcohol, and caffeine until 48 hours after all symptoms are gone. ¨ Avoid chewing gum that contains sorbitol. ¨ Avoid dairy products (except for yogurt with Lactobacillus) while you have diarrhea and for 3 days after symptoms are gone. · The doctor may recommend that you take over-the-counter medicine, such as loperamide (Imodium), if you still have diarrhea after 6 hours. Read and follow all instructions on the label. Do not use this medicine if you have bloody diarrhea, a high fever, or other signs of serious illness. Call your doctor if you think you are having a problem with your medicine. When should you call for help? Call 911 anytime you think you may need emergency care. For example, call if: 
  · You passed out (lost consciousness).  
  · Your stools are maroon or very bloody.  
 Call your doctor now or seek immediate medical care if: 
  · You are dizzy or lightheaded, or you feel like you may faint.  
  · Your stools are black and look like tar, or they have streaks of blood.  
  · You have new or worse belly pain.  
  · You have symptoms of dehydration, such as: ¨ Dry eyes and a dry mouth. ¨ Passing only a little dark urine. ¨ Feeling thirstier than usual.  
  · You have a new or higher fever.  
 Watch closely for changes in your health, and be sure to contact your doctor if: 
  · Your diarrhea is getting worse.  
  · You see pus in the diarrhea.   · You are not getting better after 2 days (48 hours). Where can you learn more? Go to http://tootie-mery.info/. Enter J021 in the search box to learn more about \"Diarrhea: Care Instructions. \" Current as of: November 20, 2017 Content Version: 11.7 © 0318-1570 [employer]. Care instructions adapted under license by [employer] (which disclaims liability or warranty for this information). If you have questions about a medical condition or this instruction, always ask your healthcare professional. [address] any warranty or liability for your use of this information. Diet for Irritable Bowel Syndrome: Care Instructions Your Care Instructions Irritable bowel syndrome, or IBS, is a problem with the intestines. IBS can cause belly pain, bloating, gas, constipation, and diarrhea. Most people can control their symptoms by changing their diet and easing stress. No specific foods cause everyone with IBS to have symptoms. Doctors don't offer a specific diet to manage symptoms. But many people find that they feel better when they stop eating certain foods. A high-fiber diet may help if you have constipation. Follow-up care is a key part of your treatment and safety. Be sure to make and go to all appointments, and call your doctor if you are having problems. It's also a good idea to know your test results and keep a list of the medicines you take. How can you care for yourself at home? To reduce constipation · Include fruits, vegetables, beans, and whole grains in your diet each day. These foods are high in fiber. Slowly increase the amount of fiber you eat. This helps you avoid a lot of gas. · Drink plenty of fluids, enough so that your urine is light yellow or clear like water. If you have kidney, heart, or liver disease and have to limit fluids, talk with your doctor before you increase the amount of fluids you drink. · Get some exercise every day. Build up slowly to 30 to 60 minutes a day on 5 or more days of the week. · Take a fiber supplement, such as Citrucel or Metamucil, every day if needed. Read and follow all instructions on the label. · Schedule time each day for a bowel movement. Having a daily routine may help. Take your time and do not strain when having a bowel movement. · Check with your doctor before you increase the amount of fiber in your diet. For some people who have IBS, eating more fiber may make some symptoms worse. This includes bloating. To reduce diarrhea You may try giving up foods or drinks one at a time to see whether symptoms improve. Limit or avoid the following: · Alcohol · Caffeine, which is found in coffee, tea, cola drinks, and chocolate · Nicotine, from smoking or chewing tobacco 
· Gas-producing foods, such as beans, broccoli, cabbage, and apples · Dairy products that contain lactose (milk sugar), such as ice cream, milk, cheese, and sour cream 
· Foods and drinks high in sugar, especially fruit juice, soda, candy, and other packaged sweets (such as cookies) · Foods high in fat, including farias, sausage, butter, oils, and anything deep-fried · Sorbitol and xylitol, artificial sweeteners found in some sugarless candies and chewing gum Keep track of foods · Some people with IBS use a daily food diary to keep track of what they eat and whether they have any symptoms after eating certain foods. The diary also can be a good way to record what is going on in your life. · Stress plays a role in IBS. So if you are aware that certain stresses bring on symptoms, you can try to reduce those stresses. Keep mealtimes pleasant · Try to maintain a pleasant environment when you eat. This may reduce stress that can make symptoms likely to occur. · Give yourself plenty of time to eat, rather than eating on the go. Chew your food slowly. Try not to swallow air, which can cause bloating. Where can you learn more? Go to http://tootie-mery.info/. Enter M781 in the search box to learn more about \"Diet for Irritable Bowel Syndrome: Care Instructions. \" Current as of: May 12, 2017 Content Version: 11.7 © 9055-4348 [employer]. Care instructions adapted under license by [employer] (which disclaims liability or warranty for this information). If you have questions about a medical condition or this instruction, always ask your healthcare professional. [place] [address] any warranty or liability for your use of this information. Introducing [address]! [facility] introduces [employer] patient portal. Now you can access parts of your medical record, email your doctor's office, and request medication refills online. 1. In your internet browser, go to [email or web address] 2. Click on the First Time User? Click Here link in the Sign In box. You will see the New Member Sign Up page. 3. Enter your [employer] Machine Access Code exactly as it appears below. You will not need to use this code after youve completed the sign-up process. If you do not sign up before the expiration date, you must request a new code. · [employer] Access Code: [email or web address] Expires: 11/19/2018  2:14 PM 
 
4. Enter the last four digits of your Social Security Number (xxxx) and Date of Birth (mm/dd/yyyy) as indicated and click Submit. You will be taken to the next sign-up page. 5. Create a ChipXt ID. This will be your [employer] Machine login ID and cannot be changed, so think of one that is secure and easy to remember. 6. Create a [employer] Machine password. You can change your password at any time. 7. Enter your Password Reset Question and Answer. This can be used at a later time if you forget your password. 8. Enter your e-mail address. You will receive e-mail notification when new information is available in [address] 9. Click Sign Up. You can now view and download portions of your medical record. 10. Click the Download Summary menu link to download a portable copy of your medical information. If you have questions, please visit the Frequently Asked Questions section of the Specpage website. Remember, Specpage is NOT to be used for urgent needs. For medical emergencies, dial 911. Now available from your iPhone and Android! Please provide this summary of care documentation to your next provider. Your primary care clinician is listed as Cydney Gutierrez. If you have any questions after today's visit, please call [phone].

## 2018-08-21 NOTE — PATIENT INSTRUCTIONS
Medicare Wellness Visit, Female     The best way to live healthy is to have a lifestyle where you eat a well-balanced diet, exercise regularly, limit alcohol use, and quit all forms of tobacco/nicotine, if applicable. Regular preventive services are another way to keep healthy. Preventive services (vaccines, screening tests, monitoring & exams) can help personalize your care plan, which helps you manage your own care. Screening tests can find health problems at the earliest stages, when they are easiest to treat. Андрей Boyer follows the current, evidence-based guidelines published by the [facility] Terrell Albert ([facility]) when recommending preventive services for our patients. Because we follow these guidelines, sometimes recommendations change over time as research supports it. (For example, mammograms used to be recommended annually. Even though Medicare will still pay for an annual mammogram, the newer guidelines recommend a mammogram every two years for women of average risk.)  Of course, you and your doctor may decide to screen more often for some diseases, based on your risk and your health status. Preventive services for you include:  - Medicare offers their members a free annual wellness visit, which is time for you and your primary care provider to discuss and plan for your preventive service needs. Take advantage of this benefit every year!  -All adults over the age of 72 should receive the recommended pneumonia vaccines. Current USPSTF guidelines recommend a series of two vaccines for the best pneumonia protection.   -All adults should have a flu vaccine yearly and a tetanus vaccine every 10 years. All adults age 61 and older should receive a shingles vaccine once in their lifetime.    -A bone mass density test is recommended when a woman turns 65 to screen for osteoporosis. This test is only recommended one time, as a screening.  Some providers will use this same test as a disease monitoring tool if you already have osteoporosis. -All adults age 38-68 who are overweight should have a diabetes screening test once every three years.   -Other screening tests and preventive services for persons with diabetes include: an eye exam to screen for diabetic retinopathy, a kidney function test, a foot exam, and stricter control over your cholesterol.   -Cardiovascular screening for adults with routine risk involves an electrocardiogram (ECG) at intervals determined by your doctor.   -Colorectal cancer screenings should be done for adults age 54-65 with no increased risk factors for colorectal cancer. There are a number of acceptable methods of screening for this type of cancer. Each test has its own benefits and drawbacks. Discuss with your doctor what is most appropriate for you during your annual wellness visit. The different tests include: colonoscopy (considered the best screening method), a fecal occult blood test, a fecal DNA test, and sigmoidoscopy. -Breast cancer screenings are recommended every other year for women of normal risk, age 54-69.  -Cervical cancer screenings for women over age 72 are only recommended with certain risk factors.   -All adults born between [facility] should be screened once for Hepatitis C. Here is a list of your current Health Maintenance items (your personalized list of preventive services) with a due date:  Health Maintenance Due   Topic Date Due    Glaucoma Screening   10/21/2017    Annual Well Visit  05/10/2018    Flu Vaccine  08/01/2018            Diarrhea: Care Instructions  Your Care Instructions    Diarrhea is loose, watery stools (bowel movements). The exact cause is often hard to find. Sometimes diarrhea is your body's way of getting rid of what caused an upset stomach. Viruses, food poisoning, and many medicines can cause diarrhea. Some people get diarrhea in response to emotional stress, anxiety, or certain foods.   Almost everyone has diarrhea now and then. It usually isn't serious, and your stools will return to normal soon. The important thing to do is replace the fluids you have lost, so you can prevent dehydration. The doctor has checked you carefully, but problems can develop later. If you notice any problems or new symptoms, get medical treatment right away. Follow-up care is a key part of your treatment and safety. Be sure to make and go to all appointments, and call your doctor if you are having problems. It's also a good idea to know your test results and keep a list of the medicines you take. How can you care for yourself at home? · Watch for signs of dehydration, which means your body has lost too much water. Dehydration is a serious condition and should be treated right away. Signs of dehydration are:  ¨ Increasing thirst and dry eyes and mouth. ¨ Feeling faint or lightheaded. ¨ Darker urine, and a smaller amount of urine than normal.  · To prevent dehydration, drink plenty of fluids, enough so that your urine is light yellow or clear like water. Choose water and other caffeine-free clear liquids until you feel better. If you have kidney, heart, or liver disease and have to limit fluids, talk with your doctor before you increase the amount of fluids you drink. · Begin eating small amounts of mild foods the next day, if you feel like it. ¨ Try yogurt that has live cultures of Lactobacillus. (Check the label.)  ¨ Avoid spicy foods, fruits, alcohol, and caffeine until 48 hours after all symptoms are gone. ¨ Avoid chewing gum that contains sorbitol. ¨ Avoid dairy products (except for yogurt with Lactobacillus) while you have diarrhea and for 3 days after symptoms are gone. · The doctor may recommend that you take over-the-counter medicine, such as loperamide (Imodium), if you still have diarrhea after 6 hours. Read and follow all instructions on the label.  Do not use this medicine if you have bloody diarrhea, a high fever, or other signs of serious illness. Call your doctor if you think you are having a problem with your medicine. When should you call for help? Call 911 anytime you think you may need emergency care. For example, call if:    · You passed out (lost consciousness).     · Your stools are maroon or very bloody.    Call your doctor now or seek immediate medical care if:    · You are dizzy or lightheaded, or you feel like you may faint.     · Your stools are black and look like tar, or they have streaks of blood.     · You have new or worse belly pain.     · You have symptoms of dehydration, such as:  ¨ Dry eyes and a dry mouth. ¨ Passing only a little dark urine. ¨ Feeling thirstier than usual.     · You have a new or higher fever.    Watch closely for changes in your health, and be sure to contact your doctor if:    · Your diarrhea is getting worse.     · You see pus in the diarrhea.     · You are not getting better after 2 days (48 hours). Where can you learn more? Go to http://tootie-mery.info/. Enter R310 in the search box to learn more about \"Diarrhea: Care Instructions. \"  Current as of: November 20, 2017  Content Version: 11.7  © 8850-2158 [employer]. Care instructions adapted under license by [employer] (which disclaims liability or warranty for this information). If you have questions about a medical condition or this instruction, always ask your healthcare professional. [place] [address] any warranty or liability for your use of this information. Diet for Irritable Bowel Syndrome: Care Instructions  Your Care Instructions    Irritable bowel syndrome, or IBS, is a problem with the intestines. IBS can cause belly pain, bloating, gas, constipation, and diarrhea. Most people can control their symptoms by changing their diet and easing stress. No specific foods cause everyone with IBS to have symptoms.  Doctors don't offer a specific diet to manage symptoms. But many people find that they feel better when they stop eating certain foods. A high-fiber diet may help if you have constipation. Follow-up care is a key part of your treatment and safety. Be sure to make and go to all appointments, and call your doctor if you are having problems. It's also a good idea to know your test results and keep a list of the medicines you take. How can you care for yourself at home? To reduce constipation  · Include fruits, vegetables, beans, and whole grains in your diet each day. These foods are high in fiber. Slowly increase the amount of fiber you eat. This helps you avoid a lot of gas. · Drink plenty of fluids, enough so that your urine is light yellow or clear like water. If you have kidney, heart, or liver disease and have to limit fluids, talk with your doctor before you increase the amount of fluids you drink. · Get some exercise every day. Build up slowly to 30 to 60 minutes a day on 5 or more days of the week. · Take a fiber supplement, such as Citrucel or Metamucil, every day if needed. Read and follow all instructions on the label. · Schedule time each day for a bowel movement. Having a daily routine may help. Take your time and do not strain when having a bowel movement. · Check with your doctor before you increase the amount of fiber in your diet. For some people who have IBS, eating more fiber may make some symptoms worse. This includes bloating. To reduce diarrhea  You may try giving up foods or drinks one at a time to see whether symptoms improve.  Limit or avoid the following:  · Alcohol  · Caffeine, which is found in coffee, tea, cola drinks, and chocolate  · Nicotine, from smoking or chewing tobacco  · Gas-producing foods, such as beans, broccoli, cabbage, and apples  · Dairy products that contain lactose (milk sugar), such as ice cream, milk, cheese, and sour cream  · Foods and drinks high in sugar, especially fruit juice, soda, candy, and other packaged sweets (such as cookies)  · Foods high in fat, including farias, sausage, butter, oils, and anything deep-fried  · Sorbitol and xylitol, artificial sweeteners found in some sugarless candies and chewing gum  Keep track of foods  · Some people with IBS use a daily food diary to keep track of what they eat and whether they have any symptoms after eating certain foods. The diary also can be a good way to record what is going on in your life. · Stress plays a role in IBS. So if you are aware that certain stresses bring on symptoms, you can try to reduce those stresses. Keep mealtimes pleasant  · Try to maintain a pleasant environment when you eat. This may reduce stress that can make symptoms likely to occur. · Give yourself plenty of time to eat, rather than eating on the go. Chew your food slowly. Try not to swallow air, which can cause bloating. Where can you learn more? Go to http://tootie-mery.info/. Enter C261 in the search box to learn more about \"Diet for Irritable Bowel Syndrome: Care Instructions. \"  Current as of: May 12, 2017  Content Version: 11.7  © 1064-7781 [employer]. Care instructions adapted under license by [employer] (which disclaims liability or warranty for this information). If you have questions about a medical condition or this instruction, always ask your healthcare professional. [address] any warranty or liability for your use of this information.

## 2018-08-21 NOTE — PROGRESS NOTES
This is the Subsequent Medicare Annual Wellness Exam, performed 12 months or more after the Initial AWV or the last Subsequent AWV    I have reviewed the patient's medical history in detail and updated the computerized patient record. History   79 yo female here for [address] Independent with IADLs though has never driven. Son lives with her. Past Medical History:   Diagnosis Date    Cancer [facility]) 8/2001    skin ca; chest     Degenerative arthritis of lumbar spine 9/26/2011    Depression 9/26/2011    Glaucoma     Hypertension     Menopause     Osteoporosis     Scoliosis     Vitamin D deficiency       Past Surgical History:   Procedure Laterality Date    BREAST SURGERY PROCEDURE UNLISTED      HX BREAST LUMPECTOMY      bilateral; non cancerous    HX GYN  8/2001    adhesion of the uterus      Current Outpatient Prescriptions   Medication Sig Dispense Refill    latanoprost (XALATAN) 0.005 % ophthalmic solution place 1 drop into both eyes at bedtime  1    LORazepam (ATIVAN) 0.5 mg tablet I tab po q day if needed for anxiety  Indications: anxiety 90 Tab 0    acetaminophen (TYLENOL) 500 mg tablet Take 2 Tabs by mouth three (3) times daily as needed for Pain. Indications: Pain 100 Tab 3    hydrocortisone (ANUCORT-HC) 25 mg supp Insert 1 Suppository into rectum two (2) times a day. 180 Each 0    linaclotide (LINZESS) 72 mcg cap Take 1 Cap by mouth daily. Indications: Constipation Predominant Irritable Bowel Syndrome 90 Cap 3    escitalopram oxalate (LEXAPRO) 10 mg tablet Take 1 Tab by mouth daily. 90 Tab 5    fluticasone (FLONASE) 50 mcg/actuation nasal spray 2 Sprays by Both Nostrils route daily. 1 Bottle 3    lansoprazole (PREVACID) 15 mg capsule 1 bid 180 Cap 5    lactulose (CHRONULAC) 10 gram/15 mL solution Take 15 mL by mouth daily. 473 mL 2    aspirin delayed-release 81 mg tablet Take 1 Tab by mouth daily.  30 Tab 6    simethicone (GAS-X) 80 mg chewable tablet Take 80 mg by mouth every six (6) hours as needed for Flatulence.  brimonidine (ALPHAGAN) 0.15 % ophthalmic solution Administer 1 Drop to both eyes two (2) times a day. Allergies   Allergen Reactions    Flexeril [Cyclobenzaprine] Unknown (comments)     unknown    Pcn [Penicillins] Rash     Family History   Problem Relation Age of Onset    Cancer Mother     Heart Disease Father     Hypertension Sister     Diabetes Brother     Heart Disease Brother     Cancer Brother     Heart Disease Sister      Social History   Substance Use Topics    Smoking status: Never Smoker    Smokeless tobacco: Never Used    Alcohol use No     Patient Active Problem List   Diagnosis Code    Irritable bowel syndrome K58.9    Degenerative arthritis of lumbar spine M47.816    Glaucoma H40.9    Anxiety F41.9    Rectal polyp K62.1    Rectal pain K62.89    Persistent cough R05    Lung crackles R09.89    Chills R68.83    Abnormal chest x-ray R93.8    Mild depression (HCC) F32.0       Depression Risk Factor Screening:     PHQ over the last two weeks 8/21/2018   Little interest or pleasure in doing things Not at all   Feeling down, depressed, irritable, or hopeless Not at all   Total Score PHQ 2 0     Alcohol Risk Factor Screening: You do not drink alcohol or very rarely. Functional Ability and Level of Safety:   Hearing Loss  Hearing is good. Activities of Daily Living  The home contains: [employer]  Patient needs help with:  transportation. She has never driven    Fall Risk  Fall Risk Assessment, last 12 mths 8/21/2018   Able to walk? Yes   Fall in past 12 months?  No   Fall with injury? -   Number of falls in past 12 months -   Fall Risk Score -       Abuse Screen  Patient is not abused    Cognitive Screening   Evaluation of Cognitive Function:  Has your family/caregiver stated any concerns about your memory: no  Normal, Mini Cog test    Patient Care Team   Patient Care Team:  Harley Griffin MD as PCP - General (Internal Medicine)  Keyla Osman Samantha Sheehan MD (Cardiology)    Assessment/Plan   Education and counseling provided:  Are appropriate based on today's review and evaluation  End-of-Life planning (with patient's consent)  Influenza Vaccine    Diagnoses and all orders for this visit:    1. Medicare annual wellness visit, subsequent    Other orders  -     hydrocortisone (ANUCORT-HC) 25 mg supp; Insert 1 Suppository into rectum two (2) times a day. -     escitalopram oxalate (LEXAPRO) 10 mg tablet; Take 1 Tab by mouth daily. Health Maintenance Due   Topic Date Due    GLAUCOMA SCREENING Q2Y  10/21/2017    MEDICARE YEARLY EXAM  05/10/2018    Influenza Age 5 to Adult  08/01/2018     Discussed ACP. She reports her son is aware of her wishes and she has paperwork at home. Is followed closely by ophthalmology for her glaucoma.

## 2018-08-21 NOTE — ACP (ADVANCE CARE PLANNING)
Advance Care Planning (ACP) Provider Conversation Snapshot    Date of ACP Conversation: 08/21/18  Persons included in Conversation:  patient  Length of ACP Conversation in minutes:  <16 minutes (Non-Billable)    Authorized Decision Maker (if patient is incapable of making informed decisions):    This person is:   Healthcare Agent/Medical Power of  under Advance Directive          For Patients with Decision Making Capacity:   Values/Goals: Exploration of values, goals, and preferences if recovery is not expected, even with continued medical treatment in the event of:  Imminent death  Severe, permanent brain injury    Conversation Outcomes / Follow-Up Plan:   Recommended communicating the plan and making copies for the healthcare agent, personal physician, and others as appropriate (e.g., health system)

## 2018-08-21 NOTE — PROGRESS NOTES
HISTORY OF PRESENT ILLNESS  Surya Smith is a 80 y.o. female. HPI Comments: 81 yo female here for [address], f/u of anxiety, IBS. IBS usually constipation. Has had diarrhea over past few weeks. Stopped taking lactulose No fevers, Cramping better after BM. No BRBPR or melena. Taking mylanta, imodium which help Mylanta stops gerd sx. Can have 4 BMs in morning then better through out day. HA: frontal region off and on throughout summer. Has been using nasal spray. Sx better  HTN Not checking at home. BP elevated today. No CP, SOB. Anxiety is stable with lorazepam. Has been trying to take 1/2 tab but pills are not easy to split. Review of Systems   Constitutional: Negative for chills, fever and weight loss. HENT: Negative for congestion and ear pain. Eyes: Negative for blurred vision and pain. Respiratory: Negative for cough and shortness of breath. Cardiovascular: Negative for chest pain, palpitations and leg swelling. Gastrointestinal: Positive for constipation and diarrhea. Negative for blood in stool, melena, nausea and vomiting. Genitourinary: Negative for frequency and urgency. Musculoskeletal: Negative for joint pain and myalgias. Skin: Negative for itching and rash. Neurological: Positive for headaches. Negative for dizziness and tingling. Psychiatric/Behavioral: Negative for depression. The patient is not nervous/anxious.       Past Medical History:   Diagnosis Date    Cancer ([facility] [address] 8/2001    skin ca; chest     Degenerative arthritis of lumbar spine 9/26/2011    Depression 9/26/2011    Glaucoma     Hypertension     Menopause     Osteoporosis     Scoliosis     Vitamin D deficiency      Current Outpatient Prescriptions on File Prior to Visit   Medication Sig Dispense Refill    latanoprost (XALATAN) 0.005 % ophthalmic solution place 1 drop into both eyes at bedtime  1    LORazepam (ATIVAN) 0.5 mg tablet I tab po q day if needed for anxiety  Indications: anxiety 90 Tab 0    acetaminophen (TYLENOL) 500 mg tablet Take 2 Tabs by mouth three (3) times daily as needed for Pain. Indications: Pain 100 Tab 3    hydrocortisone (ANUCORT-HC) 25 mg supp Insert 1 Suppository into rectum two (2) times a day. 180 Each 0    linaclotide (LINZESS) 72 mcg cap Take 1 Cap by mouth daily. Indications: Constipation Predominant Irritable Bowel Syndrome 90 Cap 3    escitalopram oxalate (LEXAPRO) 10 mg tablet Take 1 Tab by mouth daily. 90 Tab 5    fluticasone (FLONASE) 50 mcg/actuation nasal spray 2 Sprays by Both Nostrils route daily. 1 Bottle 3    lansoprazole (PREVACID) 15 mg capsule 1 bid 180 Cap 5    lactulose (CHRONULAC) 10 gram/15 mL solution Take 15 mL by mouth daily. 473 mL 2    aspirin delayed-release 81 mg tablet Take 1 Tab by mouth daily. 30 Tab 6    simethicone (GAS-X) 80 mg chewable tablet Take 80 mg by mouth every six (6) hours as needed for Flatulence.  brimonidine (ALPHAGAN) 0.15 % ophthalmic solution Administer 1 Drop to both eyes two (2) times a day. No current facility-administered medications on file prior to visit. Physical Exam   Constitutional: She appears well-developed and well-nourished. No distress. /81 (BP 1 Location: Right arm, BP Patient Position: Sitting)  Pulse 68  Temp 96.8 °F (36 °C) (Oral)   Resp 16  Ht 4' 8\" (1.422 m)  Wt 97 lb 3.2 oz (44.1 kg)  SpO2 99%  BMI 21.79 kg/m2     Eyes: EOM are normal. Right eye exhibits no discharge. Left eye exhibits no discharge. No scleral icterus. Neck: Neck supple. Cardiovascular: Normal rate, regular rhythm and normal heart sounds. Exam reveals no gallop and no friction rub. No murmur heard. Pulmonary/Chest: Effort normal and breath sounds normal. No respiratory distress. She has no wheezes. She has no rales. Abdominal: Soft. She exhibits no distension. There is no tenderness. There is no rebound and no guarding. Musculoskeletal: She exhibits no edema or tenderness.    Lymphadenopathy: She has no cervical adenopathy. Neurological: She is alert. She exhibits normal muscle tone. Skin: Skin is warm and dry. Psychiatric: She has a normal mood and affect. Lab Results   Component Value Date/Time    Sodium 140 08/10/2017 02:04 PM    Potassium 3.9 08/10/2017 02:04 PM    Chloride 103 08/10/2017 02:04 PM    CO2 29 08/10/2017 02:04 PM    Anion gap 8 08/10/2017 02:04 PM    Glucose 93 08/10/2017 02:04 PM    BUN 19 (H) 08/10/2017 02:04 PM    Creatinine 0.64 08/10/2017 02:04 PM    BUN/Creatinine ratio 30 (H) 08/10/2017 02:04 PM    GFR est AA >60 08/10/2017 02:04 PM    GFR est non-AA >60 08/10/2017 02:04 PM    Calcium 9.6 08/10/2017 02:04 PM    Bilirubin, total 0.2 08/10/2017 02:04 PM    AST (SGOT) 19 08/10/2017 02:04 PM    Alk. phosphatase 94 08/10/2017 02:04 PM    Protein, total 7.0 08/10/2017 02:04 PM    Albumin 3.6 08/10/2017 02:04 PM    Globulin 3.4 08/10/2017 02:04 PM    A-G Ratio 1.1 08/10/2017 02:04 PM    ALT (SGPT) 23 08/10/2017 02:04 PM     Lab Results   Component Value Date/Time    Cholesterol, total 188 08/10/2017 02:04 PM    HDL Cholesterol 73 (H) 08/10/2017 02:04 PM    LDL, calculated 99.2 08/10/2017 02:04 PM    VLDL, calculated 15.8 08/10/2017 02:04 PM    Triglyceride 79 08/10/2017 02:04 PM    CHOL/HDL Ratio 2.6 08/10/2017 02:04 PM   No results found for: TSH, TSH2, TSH3, TSHP, TSHEXT  Lab Results   Component Value Date/Time    WBC 8.2 08/10/2017 02:04 PM    HGB 11.8 (L) 08/10/2017 02:04 PM    HCT 37.2 08/10/2017 02:04 PM    PLATELET 467 64/55/3585 02:04 PM    MCV 92.5 08/10/2017 02:04 PM     ASSESSMENT and PLAN    ICD-10-CM ICD-9-CM    1. Medicare annual wellness visit, subsequent Z00.00 V70.0    2. Other irritable bowel syndrome K58.8 564.1    3. Anxiety F41.9 300.00    4. Essential hypertension I10 401.9 CBC WITH AUTOMATED DIFF      METABOLIC PANEL, COMPREHENSIVE      LIPID PANEL      TSH 3RD GENERATION      URINALYSIS W/ RFLX MICROSCOPIC     Will repeat labs today.    Starting low dose lisinopril for BP. Provided info on diet regarding loose stools, IBS.  RTC if sx persist.

## 2018-08-21 NOTE — PROGRESS NOTES
ROOM # 17  Identified pt with two pt identifiers(name and ). Reviewed record in preparation for visit and have obtained necessary documentation. Chief Complaint   Patient presents with    Annual Wellness Visit    Diarrhea    Epigastric Pain      Kameron Stone preferred language for health care discussion is english/other. Is the patient using any DME equipment during OV? Beata Das is due for:  Health Maintenance Due   Topic    GLAUCOMA SCREENING Q2Y     MEDICARE YEARLY EXAM     Influenza Age 5 to Adult      Health Maintenance reviewed and discussed per provider  Please order/place referral if appropriate. Advance Directive:  1. Do you have an advance directive in place? Patient Reply: NO    2. If not, would you like material regarding how to put one in place? NO    Coordination of Care:  1. Have you been to the ER, urgent care clinic since your last visit? Hospitalized since your last visit? NO    2. Have you seen or consulted any other health care providers outside of the [address] [place] [address] since your last visit? Include any pap smears or colon screening. NO    Patient is accompanied by self I have received verbal consent from Kameron Stone to discuss any/all medical information while they are present in the room.     Learning Assessment:  Learning Assessment 2013   PRIMARY LEARNER Patient Patient Patient   HIGHEST LEVEL OF EDUCATION - PRIMARY LEARNER  GRADUATED HIGH SCHOOL OR GED GRADUATED HIGH SCHOOL OR GED -   BARRIERS PRIMARY LEARNER NONE NONE -   PRIMARY LANGUAGE ENGLISH ENGLISH ENGLISH    NEED - No -   LEARNER PREFERENCE PRIMARY DEMONSTRATION DEMONSTRATION READING   LEARNING SPECIAL TOPICS - no -   ANSWERED BY patient patient patient   RELATIONSHIP SELF SELF SELF     Depression Screening:  PHQ over the last two weeks 2018 2018 2017 11/10/2017 8/10/2017 2017 2017   Little interest or pleasure in doing things Not at all Not at all Not at all Several days Not at all Not at all Not at all   Feeling down, depressed, irritable, or hopeless Not at all Not at all Not at all Several days Not at all Not at all Not at all   Total Score PHQ 2 0 0 0 2 0 0 0     Abuse Screening:  Abuse Screening Questionnaire 5/9/2017 1/28/2016 4/9/2015   Do you ever feel afraid of your partner? N N N   Are you in a relationship with someone who physically or mentally threatens you? N N N   Is it safe for you to go home? Leanne Lloyd     Fall Risk  Fall Risk Assessment, last 12 mths 8/21/2018 5/14/2018 12/1/2017 11/10/2017 8/10/2017 5/9/2017 1/30/2017   Able to walk? Yes Yes Yes Yes Yes Yes Yes   Fall in past 12 months?  No No No No No No Yes   Fall with injury? - - - - - - Yes   Number of falls in past 12 months - - - - - - 1   Fall Risk Score - - - - - - 2

## 2018-09-19 ENCOUNTER — TELEPHONE (OUTPATIENT)
Dept: INTERNAL MEDICINE CLINIC | Age: 83
End: 2018-09-19

## 2018-09-19 NOTE — TELEPHONE ENCOUNTER
Patient called to check if the following immunizations recommended by her pharmacy Shingles, influenza, pneumonia and prevnar were recommended by PCP as well.

## 2018-10-05 ENCOUNTER — HOSPITAL ENCOUNTER (OUTPATIENT)
Dept: MAMMOGRAPHY | Age: 83
Discharge: HOME | End: 2018-10-05
Attending: INTERNAL MEDICINE
Payer: MEDICARE

## 2018-10-05 DIAGNOSIS — Z12.31 VISIT FOR SCREENING MAMMOGRAM: ICD-10-CM

## 2018-10-05 PROCEDURE — 77063 BREAST TOMOSYNTHESIS BI: CPT

## 2018-11-26 ENCOUNTER — OFFICE VISIT (OUTPATIENT)
Dept: INTERNAL MEDICINE CLINIC | Age: 83
End: 2018-11-26

## 2018-11-26 VITALS
HEART RATE: 76 BPM | BODY MASS INDEX: 22.09 KG/M2 | WEIGHT: 98.2 LBS | DIASTOLIC BLOOD PRESSURE: 67 MMHG | TEMPERATURE: 97.2 F | HEIGHT: 56 IN | OXYGEN SATURATION: 97 % | RESPIRATION RATE: 18 BRPM | SYSTOLIC BLOOD PRESSURE: 136 MMHG

## 2018-11-26 DIAGNOSIS — F32.A MILD DEPRESSION: ICD-10-CM

## 2018-11-26 DIAGNOSIS — F41.9 ANXIETY: ICD-10-CM

## 2018-11-26 DIAGNOSIS — K58.8 OTHER IRRITABLE BOWEL SYNDROME: ICD-10-CM

## 2018-11-26 DIAGNOSIS — I10 ESSENTIAL HYPERTENSION: Primary | ICD-10-CM

## 2018-11-26 RX ORDER — LORAZEPAM 0.5 MG/1
TABLET ORAL
Qty: 90 TAB | Refills: 0 | Status: SHIPPED | OUTPATIENT
Start: 2018-11-26 | End: 2019-05-29

## 2018-11-26 NOTE — PATIENT INSTRUCTIONS

## 2018-11-26 NOTE — PROGRESS NOTES
HISTORY OF PRESENT ILLNESS  Tamiko Kent is a 80 y.o. female. 79 yo female here for f/u of HTN, Anxiety, IBS. Feels well at this time though continued issues with IBS sx. Not taking Linzess. Notes prune juice, dietary changes help some with constipation. Lactulose helps a little. Occasionally will note small amt BRBPR with straining. HTN is improved with low dose of lisinopril. Tolerating. Exercising regularly. Anxiety stable with 1/2 tablet of lorazepam nightly. Denies side effects. Occasional joint pain. Taking usually only one Tylenol daily. Hypertension    Pertinent negatives include no chest pain, no palpitations, no blurred vision, no headaches, no dizziness, no shortness of breath, no nausea and no vomiting. Review of Systems   Constitutional: Negative for chills, fever and weight loss. HENT: Negative for congestion and ear pain. Eyes: Negative for blurred vision and pain. Respiratory: Negative for cough and shortness of breath. Cardiovascular: Negative for chest pain, palpitations and leg swelling. Gastrointestinal: Positive for constipation. Negative for melena, nausea and vomiting. Genitourinary: Negative for frequency and urgency. Musculoskeletal: Positive for joint pain. Negative for myalgias. Skin: Negative for itching and rash. Neurological: Negative for dizziness, tingling and headaches. Psychiatric/Behavioral: Negative for depression. The patient is not nervous/anxious. Past Medical History:   Diagnosis Date    Cancer [facility]) 8/2001    skin ca; chest     Degenerative arthritis of lumbar spine 9/26/2011    Depression 9/26/2011    Glaucoma     Hypertension     Menopause     Osteoporosis     Scoliosis     Vitamin D deficiency      Current Outpatient Medications on File Prior to Visit   Medication Sig Dispense Refill    hydrocortisone (ANUCORT-HC) 25 mg supp Insert 1 Suppository into rectum two (2) times a day.  180 Each 0    escitalopram oxalate (LEXAPRO) 10 mg tablet Take 1 Tab by mouth daily. 90 Tab 5    lisinopril (PRINIVIL, ZESTRIL) 5 mg tablet Take 1 Tab by mouth daily. Indications: hypertension 90 Tab 3    lactulose (CHRONULAC) 10 gram/15 mL solution Take 15 mL by mouth daily. 473 mL 2    latanoprost (XALATAN) 0.005 % ophthalmic solution place 1 drop into both eyes at bedtime  1    acetaminophen (TYLENOL) 500 mg tablet Take 2 Tabs by mouth three (3) times daily as needed for Pain. Indications: Pain 100 Tab 3    linaclotide (LINZESS) 72 mcg cap Take 1 Cap by mouth daily. Indications: Constipation Predominant Irritable Bowel Syndrome 90 Cap 3    aspirin delayed-release 81 mg tablet Take 1 Tab by mouth daily. 30 Tab 6    simethicone (GAS-X) 80 mg chewable tablet Take 80 mg by mouth every six (6) hours as needed for Flatulence.  fluticasone (FLONASE) 50 mcg/actuation nasal spray 2 Sprays by Both Nostrils route daily. 1 Bottle 3    lansoprazole (PREVACID) 15 mg capsule 1 bid 180 Cap 5     No current facility-administered medications on file prior to visit. Physical Exam   Constitutional: She appears well-developed and well-nourished. No distress. /67 (BP 1 Location: Right arm, BP Patient Position: Sitting)   Pulse 76   Temp 97.2 °F (36.2 °C) (Oral)   Resp 18   Ht 4' 8\" (1.422 m)   Wt 98 lb 3.2 oz (44.5 kg)   SpO2 97%   BMI 22.02 kg/m²      Eyes: EOM are normal. Right eye exhibits no discharge. Left eye exhibits no discharge. No scleral icterus. Neck: Neck supple. Cardiovascular: Normal rate, regular rhythm and normal heart sounds. Exam reveals no gallop and no friction rub. No murmur heard. Pulmonary/Chest: Effort normal and breath sounds normal. No respiratory distress. She has no wheezes. She has no rales. Abdominal: Soft. She exhibits no distension. There is no tenderness. There is no rebound. Musculoskeletal: She exhibits no edema or tenderness. Lymphadenopathy:     She has no cervical adenopathy. Neurological: She is alert. She exhibits normal muscle tone. Skin: Skin is warm and dry. Psychiatric: She has a normal mood and affect. Lab Results   Component Value Date/Time    Sodium 141 08/21/2018 02:21 PM    Potassium 4.6 08/21/2018 02:21 PM    Chloride 104 08/21/2018 02:21 PM    CO2 30 08/21/2018 02:21 PM    Anion gap 7 08/21/2018 02:21 PM    Glucose 96 08/21/2018 02:21 PM    BUN 16 08/21/2018 02:21 PM    Creatinine 0.58 (L) 08/21/2018 02:21 PM    BUN/Creatinine ratio 28 (H) 08/21/2018 02:21 PM    GFR est AA >60 08/21/2018 02:21 PM    GFR est non-AA >60 08/21/2018 02:21 PM    Calcium 9.5 08/21/2018 02:21 PM    Bilirubin, total 0.2 08/21/2018 02:21 PM    AST (SGOT) 23 08/21/2018 02:21 PM    Alk. phosphatase 101 08/21/2018 02:21 PM    Protein, total 7.2 08/21/2018 02:21 PM    Albumin 3.9 08/21/2018 02:21 PM    Globulin 3.3 08/21/2018 02:21 PM    A-G Ratio 1.2 08/21/2018 02:21 PM    ALT (SGPT) 25 08/21/2018 02:21 PM     Lab Results   Component Value Date/Time    WBC 8.1 08/21/2018 02:21 PM    HGB 12.5 08/21/2018 02:21 PM    HCT 38.7 08/21/2018 02:21 PM    PLATELET 047 58/32/9894 02:21 PM    MCV 92.6 08/21/2018 02:21 PM     ASSESSMENT and PLAN    ICD-10-CM ICD-9-CM    1. Essential hypertension I10 401.9    2. Anxiety F41.9 300.00 LORazepam (ATIVAN) 0.5 mg tablet   3. Other irritable bowel syndrome K58.8 564.1    4. Mild depression ([address] F32.0 311      BP improved  Continue with current medication for now. Ativan tolerated  Discussed dietary changes that help her IBS sx. Depression controlled at this time. Discussed increasing Tylenol for her joint pain. Can started with BID.

## 2018-11-26 NOTE — PROGRESS NOTES
ROOM # 16  Identified pt with two pt identifiers(name and ). Reviewed record in preparation for visit and have obtained necessary documentation. Chief Complaint   Patient presents with    Hypertension      Dolph Prior preferred language for health care discussion is english/other. Is the patient using any DME equipment during OV? Anthony Avilez is due for:  Health Maintenance Due   Topic    Shingrix Vaccine Age 50> (1 of 2)    GLAUCOMA SCREENING Q2Y     Influenza Age 5 to Adult      Health Maintenance reviewed and discussed per provider  Please order/place referral if appropriate. Advance Directive:  1. Do you have an advance directive in place? Patient Reply: NO    2. If not, would you like material regarding how to put one in place? NO    Coordination of Care:  1. Have you been to the ER, urgent care clinic since your last visit? Hospitalized since your last visit? NO    2. Have you seen or consulted any other health care providers outside of the [address] [place] [address] since your last visit? Include any pap smears or colon screening. YES    Patient is accompanied by self I have received verbal consent from Dolph Prior to discuss any/all medical information while they are present in the room.     Learning Assessment:  Learning Assessment 2013   PRIMARY LEARNER Patient Patient Patient   HIGHEST LEVEL OF EDUCATION - PRIMARY LEARNER  GRADUATED HIGH SCHOOL OR GED GRADUATED HIGH SCHOOL OR GED -   BARRIERS PRIMARY LEARNER NONE NONE -   PRIMARY LANGUAGE ENGLISH ENGLISH ENGLISH    NEED - No -   LEARNER PREFERENCE PRIMARY DEMONSTRATION DEMONSTRATION READING   LEARNING SPECIAL TOPICS - no -   ANSWERED BY patient patient patient   RELATIONSHIP SELF SELF SELF     Depression Screening:  PHQ over the last two weeks 2018 2018 2017 11/10/2017 8/10/2017 2017 2017   Little interest or pleasure in doing things Not at all Not at all Not at all Several days Not at all Not at all Not at all   Feeling down, depressed, irritable, or hopeless Not at all Not at all Not at all Several days Not at all Not at all Not at all   Total Score PHQ 2 0 0 0 2 0 0 0     Abuse Screening:  Abuse Screening Questionnaire 5/9/2017 1/28/2016 4/9/2015   Do you ever feel afraid of your partner? N N N   Are you in a relationship with someone who physically or mentally threatens you? N N N   Is it safe for you to go home? Julieann Castleman     Fall Risk  Fall Risk Assessment, last 12 mths 11/26/2018 8/21/2018 5/14/2018 12/1/2017 11/10/2017 8/10/2017 5/9/2017   Able to walk? Yes Yes Yes Yes Yes Yes Yes   Fall in past 12 months?  No No No No No No No   Fall with injury? - - - - - - -   Number of falls in past 12 months - - - - - - -   Fall Risk Score - - - - - - -

## 2019-02-25 ENCOUNTER — OFFICE VISIT (OUTPATIENT)
Dept: INTERNAL MEDICINE CLINIC | Age: 84
End: 2019-02-25

## 2019-02-25 VITALS
RESPIRATION RATE: 16 BRPM | OXYGEN SATURATION: 100 % | HEART RATE: 71 BPM | WEIGHT: 99.8 LBS | HEIGHT: 56 IN | DIASTOLIC BLOOD PRESSURE: 71 MMHG | BODY MASS INDEX: 22.45 KG/M2 | TEMPERATURE: 96.3 F | SYSTOLIC BLOOD PRESSURE: 157 MMHG

## 2019-02-25 DIAGNOSIS — I10 ESSENTIAL HYPERTENSION: Primary | ICD-10-CM

## 2019-02-25 DIAGNOSIS — M25.50 ARTHRALGIA, UNSPECIFIED JOINT: ICD-10-CM

## 2019-02-25 DIAGNOSIS — K58.8 OTHER IRRITABLE BOWEL SYNDROME: ICD-10-CM

## 2019-02-25 DIAGNOSIS — L30.9 DERMATITIS: ICD-10-CM

## 2019-02-25 RX ORDER — HYDROCORTISONE ACETATE 25 MG/1
25 SUPPOSITORY RECTAL 2 TIMES DAILY
Qty: 180 EACH | Refills: 0 | Status: SHIPPED | OUTPATIENT
Start: 2019-02-25 | End: 2019-02-26 | Stop reason: RX

## 2019-02-25 NOTE — PATIENT INSTRUCTIONS

## 2019-02-25 NOTE — PROGRESS NOTES
HISTORY OF PRESENT ILLNESS  Jacquelin Monge is a 80 y.o. female. 81 yo female here for f/u of HTN. Notes increased aching. Tales APAP only once in awhile; not daily. Would like to start exercising more. Rash on LE since wearing new pantyhose > 1 month. Hydrocortisone helping. No itching or pain. HTN: a little increase today. No CP, SOB. Notes she was rushing and feels BP has otherwise been doing okay. Anxiety and IBS sx stable. Hypertension    Pertinent negatives include no chest pain, no palpitations, no blurred vision, no headaches, no dizziness, no shortness of breath, no nausea and no vomiting. Review of Systems   Constitutional: Negative for chills, fever and weight loss. HENT: Negative for congestion and ear pain. Eyes: Negative for blurred vision and pain. Respiratory: Negative for cough and shortness of breath. Cardiovascular: Negative for chest pain, palpitations and leg swelling. Gastrointestinal: Negative for nausea and vomiting. Genitourinary: Negative for frequency and urgency. Musculoskeletal: Positive for joint pain. Negative for myalgias. Skin: Positive for rash. Negative for itching. Neurological: Negative for dizziness, tingling and headaches. Psychiatric/Behavioral: Negative for depression. The patient is not nervous/anxious. Past Medical History:   Diagnosis Date    Cancer ([facility] [address] 8/2001    skin ca; chest     Degenerative arthritis of lumbar spine 9/26/2011    Depression 9/26/2011    Glaucoma     Hypertension     Menopause     Osteoporosis     Scoliosis     Vitamin D deficiency      Current Outpatient Medications on File Prior to Visit   Medication Sig Dispense Refill    LORazepam (ATIVAN) 0.5 mg tablet I tab po q day if needed for anxiety 90 Tab 0    escitalopram oxalate (LEXAPRO) 10 mg tablet Take 1 Tab by mouth daily. 90 Tab 5    lisinopril (PRINIVIL, ZESTRIL) 5 mg tablet Take 1 Tab by mouth daily.  Indications: hypertension 90 Tab 3    lactulose (CHRONULAC) 10 gram/15 mL solution Take 15 mL by mouth daily. 473 mL 2    latanoprost (XALATAN) 0.005 % ophthalmic solution place 1 drop into both eyes at bedtime  1    acetaminophen (TYLENOL) 500 mg tablet Take 2 Tabs by mouth three (3) times daily as needed for Pain. Indications: Pain 100 Tab 3    aspirin delayed-release 81 mg tablet Take 1 Tab by mouth daily. 30 Tab 6    simethicone (GAS-X) 80 mg chewable tablet Take 80 mg by mouth every six (6) hours as needed for Flatulence.  fluticasone (FLONASE) 50 mcg/actuation nasal spray 2 Sprays by Both Nostrils route daily. 1 Bottle 3    lansoprazole (PREVACID) 15 mg capsule 1 bid 180 Cap 5     No current facility-administered medications on file prior to visit. Social History     Tobacco Use    Smoking status: Never Smoker    Smokeless tobacco: Never Used   Substance Use Topics    Alcohol use: No    Drug use: No     Physical Exam   Constitutional: She appears well-developed and well-nourished. No distress. /71 (BP 1 Location: Left arm, BP Patient Position: Sitting)   Pulse 71   Temp 96.3 °F (35.7 °C) (Oral)   Resp 16   Ht 4' 8\" (1.422 m)   Wt 99 lb 12.8 oz (45.3 kg)   SpO2 100%   BMI 22.37 kg/m²      Eyes: EOM are normal. Right eye exhibits no discharge. Left eye exhibits no discharge. No scleral icterus. Neck: Neck supple. Cardiovascular: Normal rate, regular rhythm and normal heart sounds. Exam reveals no gallop and no friction rub. No murmur heard. Pulmonary/Chest: Effort normal and breath sounds normal. No respiratory distress. She has no wheezes. She has no rales. Musculoskeletal: She exhibits no edema or tenderness. Lymphadenopathy:     She has no cervical adenopathy. Neurological: She is alert. She exhibits normal muscle tone. Skin: Skin is warm and dry. Rash (mild RLE) noted. Psychiatric: She has a normal mood and affect.      Lab Results   Component Value Date/Time    Sodium 141 08/21/2018 02:21 PM Potassium 4.6 08/21/2018 02:21 PM    Chloride 104 08/21/2018 02:21 PM    CO2 30 08/21/2018 02:21 PM    Anion gap 7 08/21/2018 02:21 PM    Glucose 96 08/21/2018 02:21 PM    BUN 16 08/21/2018 02:21 PM    Creatinine 0.58 (L) 08/21/2018 02:21 PM    BUN/Creatinine ratio 28 (H) 08/21/2018 02:21 PM    GFR est AA >60 08/21/2018 02:21 PM    GFR est non-AA >60 08/21/2018 02:21 PM    Calcium 9.5 08/21/2018 02:21 PM    Bilirubin, total 0.2 08/21/2018 02:21 PM    AST (SGOT) 23 08/21/2018 02:21 PM    Alk. phosphatase 101 08/21/2018 02:21 PM    Protein, total 7.2 08/21/2018 02:21 PM    Albumin 3.9 08/21/2018 02:21 PM    Globulin 3.3 08/21/2018 02:21 PM    A-G Ratio 1.2 08/21/2018 02:21 PM    ALT (SGPT) 25 08/21/2018 02:21 PM     ASSESSMENT and PLAN    ICD-10-CM ICD-9-CM    1. Essential hypertension I10 401.9    2. Arthralgia, unspecified joint M25.50 719.40    3. Dermatitis L30.9 692.9    4. Other irritable bowel syndrome K58.8 564.1      Will continue current medication for now. If BP remains elevated, can consider increasing lisinopril. Discussed taking tylenol scheduled for pain. Will try sarna prn for rash.

## 2019-02-26 ENCOUNTER — TELEPHONE (OUTPATIENT)
Dept: INTERNAL MEDICINE CLINIC | Age: 84
End: 2019-02-26

## 2019-02-26 RX ORDER — HYDROCORTISONE ACETATE 25 MG/1
25 SUPPOSITORY RECTAL EVERY 12 HOURS
Qty: 180 EACH | Refills: 3 | Status: SHIPPED | OUTPATIENT
Start: 2019-02-26 | End: 2020-07-17 | Stop reason: SDUPTHER

## 2019-02-26 NOTE — TELEPHONE ENCOUNTER
hydrocortisone (ANUCORT-HC) 25 mg supp     (regarding above listed medication) Per pharmacy \" medication requested is not available.  Hydrocort AC sup 25 mg is available at pharmacy as an alternative\"

## 2019-05-29 ENCOUNTER — HOSPITAL ENCOUNTER (OUTPATIENT)
Dept: LAB | Age: 84
Discharge: HOME | End: 2019-05-29
Payer: MEDICARE

## 2019-05-29 ENCOUNTER — OFFICE VISIT (OUTPATIENT)
Dept: INTERNAL MEDICINE CLINIC | Age: 84
End: 2019-05-29

## 2019-05-29 VITALS
SYSTOLIC BLOOD PRESSURE: 148 MMHG | HEIGHT: 56 IN | DIASTOLIC BLOOD PRESSURE: 66 MMHG | TEMPERATURE: 97.2 F | HEART RATE: 72 BPM | WEIGHT: 98.6 LBS | OXYGEN SATURATION: 99 % | RESPIRATION RATE: 16 BRPM | BODY MASS INDEX: 22.18 KG/M2

## 2019-05-29 DIAGNOSIS — I10 ESSENTIAL HYPERTENSION: ICD-10-CM

## 2019-05-29 DIAGNOSIS — I10 ESSENTIAL HYPERTENSION: Primary | ICD-10-CM

## 2019-05-29 DIAGNOSIS — M79.10 MYALGIA: ICD-10-CM

## 2019-05-29 DIAGNOSIS — M19.90 ARTHRITIS: ICD-10-CM

## 2019-05-29 DIAGNOSIS — F41.9 ANXIETY: ICD-10-CM

## 2019-05-29 LAB
ALBUMIN SERPL-MCNC: 3.8 G/DL (ref 3.4–5)
ALBUMIN/GLOB SERPL: 1.3 {RATIO} (ref 0.8–1.7)
ALP SERPL-CCNC: 96 U/L (ref 45–117)
ALT SERPL-CCNC: 23 U/L (ref 13–56)
ANION GAP SERPL CALC-SCNC: 9 MMOL/L (ref 3–18)
AST SERPL-CCNC: 24 U/L (ref 15–37)
BASOPHILS # BLD: 0.1 K/UL (ref 0–0.1)
BASOPHILS NFR BLD: 1 % (ref 0–2)
BILIRUB SERPL-MCNC: 0.2 MG/DL (ref 0.2–1)
BUN SERPL-MCNC: 20 MG/DL (ref 7–18)
BUN/CREAT SERPL: 32 (ref 12–20)
CALCIUM SERPL-MCNC: 9.3 MG/DL (ref 8.5–10.1)
CHLORIDE SERPL-SCNC: 105 MMOL/L (ref 100–108)
CK SERPL-CCNC: 96 U/L (ref 26–192)
CO2 SERPL-SCNC: 28 MMOL/L (ref 21–32)
CREAT SERPL-MCNC: 0.63 MG/DL (ref 0.6–1.3)
DIFFERENTIAL METHOD BLD: ABNORMAL
EOSINOPHIL # BLD: 0.7 K/UL (ref 0–0.4)
EOSINOPHIL NFR BLD: 9 % (ref 0–5)
ERYTHROCYTE [DISTWIDTH] IN BLOOD BY AUTOMATED COUNT: 13.6 % (ref 11.6–14.5)
GLOBULIN SER CALC-MCNC: 2.9 G/DL (ref 2–4)
GLUCOSE SERPL-MCNC: 94 MG/DL (ref 74–99)
HCT VFR BLD AUTO: 36.7 % (ref 35–45)
HGB BLD-MCNC: 11.4 G/DL (ref 12–16)
LYMPHOCYTES # BLD: 2.2 K/UL (ref 0.9–3.6)
LYMPHOCYTES NFR BLD: 29 % (ref 21–52)
MCH RBC QN AUTO: 29.3 PG (ref 24–34)
MCHC RBC AUTO-ENTMCNC: 31.1 G/DL (ref 31–37)
MCV RBC AUTO: 94.3 FL (ref 74–97)
MONOCYTES # BLD: 0.7 K/UL (ref 0.05–1.2)
MONOCYTES NFR BLD: 9 % (ref 3–10)
NEUTS SEG # BLD: 4 K/UL (ref 1.8–8)
NEUTS SEG NFR BLD: 52 % (ref 40–73)
PLATELET # BLD AUTO: 229 K/UL (ref 135–420)
PMV BLD AUTO: 10.1 FL (ref 9.2–11.8)
POTASSIUM SERPL-SCNC: 4.1 MMOL/L (ref 3.5–5.5)
PROT SERPL-MCNC: 6.7 G/DL (ref 6.4–8.2)
RBC # BLD AUTO: 3.89 M/UL (ref 4.2–5.3)
SODIUM SERPL-SCNC: 142 MMOL/L (ref 136–145)
TSH SERPL DL<=0.05 MIU/L-ACNC: 1.53 UIU/ML (ref 0.36–3.74)
WBC # BLD AUTO: 7.6 K/UL (ref 4.6–13.2)

## 2019-05-29 PROCEDURE — 80053 COMPREHEN METABOLIC PANEL: CPT

## 2019-05-29 PROCEDURE — 82550 ASSAY OF CK (CPK): CPT

## 2019-05-29 PROCEDURE — 85652 RBC SED RATE AUTOMATED: CPT

## 2019-05-29 PROCEDURE — 85025 COMPLETE CBC W/AUTO DIFF WBC: CPT

## 2019-05-29 PROCEDURE — 84443 ASSAY THYROID STIM HORMONE: CPT

## 2019-05-29 RX ORDER — ESCITALOPRAM OXALATE 10 MG/1
10 TABLET ORAL DAILY
Qty: 90 TAB | Refills: 5 | Status: SHIPPED | OUTPATIENT
Start: 2019-05-29 | End: 2020-06-09 | Stop reason: SDUPTHER

## 2019-05-29 RX ORDER — MELOXICAM 7.5 MG/1
7.5 TABLET ORAL DAILY
Qty: 90 TAB | Refills: 0 | Status: SHIPPED | OUTPATIENT
Start: 2019-05-29 | End: 2019-11-12 | Stop reason: SDUPTHER

## 2019-05-29 RX ORDER — LISINOPRIL 10 MG/1
10 TABLET ORAL DAILY
Qty: 90 TAB | Refills: 3 | Status: SHIPPED | OUTPATIENT
Start: 2019-05-29 | End: 2019-11-12

## 2019-05-29 NOTE — PROGRESS NOTES
Rm:17    Chief Complaint   Patient presents with    Hypertension    Osteoporosis     Depression Screening:  3 most recent PHQ Screens 8/21/2018 5/14/2018 12/1/2017 11/10/2017 8/10/2017 5/9/2017 1/30/2017   Little interest or pleasure in doing things Not at all Not at all Not at all Several days Not at all Not at all Not at all   Feeling down, depressed, irritable, or hopeless Not at all Not at all Not at all Several days Not at all Not at all Not at all   Total Score PHQ 2 0 0 0 2 0 0 0       Learning Assessment:  Learning Assessment 4/9/2015 12/18/2013 9/18/2013   PRIMARY LEARNER Patient Patient Patient   HIGHEST LEVEL OF EDUCATION - PRIMARY LEARNER  GRADUATED HIGH SCHOOL OR GED GRADUATED HIGH SCHOOL OR GED -   BARRIERS PRIMARY LEARNER NONE NONE -   [facility]    NEED - No -   LEARNER PREFERENCE PRIMARY DEMONSTRATION DEMONSTRATION READING   LEARNING SPECIAL TOPICS - no -   ANSWERED BY patient patient patient   RELATIONSHIP SELF SELF SELF       Abuse Screening:  Abuse Screening Questionnaire 5/9/2017 1/28/2016 4/9/2015   Do you ever feel afraid of your partner? N N N   Are you in a relationship with someone who physically or mentally threatens you? N N N   Is it safe for you to go home? Tolu Gardner       Health Maintenance reviewed and discussed per provider: yes     Coordination of Care:    1. Have you been to the ER, urgent care clinic since your last visit? Hospitalized since your last visit? no    2. Have you seen or consulted any other health care providers outside of the [address] [place] [address] since your last visit? Include any pap smears or colon screening.  no

## 2019-05-29 NOTE — PROGRESS NOTES
HISTORY OF PRESENT ILLNESS  Rose Freitas is a 80 y.o. female. Here for f/u of HTN, anxiety, pain. Increased generalized arthritis pain for past few months. . Tylenol helps a little but only taking 1-2 a day  Some nasal drainage, itching for past several months. HTN: lisinopril has been 5mg BID - Stable  Constipation - chronic issue. Stable at this time. Anxiety - is off benzo. Feels lexapro helps. Review of Systems   Constitutional: Negative for chills, fever and weight loss. HENT: Negative for congestion and ear pain. Eyes: Negative for blurred vision and pain. Respiratory: Negative for cough and shortness of breath. Cardiovascular: Negative for chest pain, palpitations and leg swelling. Gastrointestinal: Positive for constipation. Negative for blood in stool, nausea and vomiting. Genitourinary: Negative for frequency and urgency. Musculoskeletal: Positive for joint pain and myalgias. Skin: Negative for itching and rash. Neurological: Negative for dizziness, tingling and headaches. Psychiatric/Behavioral: Negative for depression. The patient is not nervous/anxious. Past Medical History:   Diagnosis Date    Cancer [facility]) 8/2001    skin ca; chest     Degenerative arthritis of lumbar spine 9/26/2011    Depression 9/26/2011    Glaucoma     Hypertension     Menopause     Osteoporosis     Scoliosis     Vitamin D deficiency      Current Outpatient Medications on File Prior to Visit   Medication Sig Dispense Refill    escitalopram oxalate (LEXAPRO) 10 mg tablet Take 1 Tab by mouth daily. 90 Tab 5    lisinopril (PRINIVIL, ZESTRIL) 5 mg tablet Take 1 Tab by mouth daily. Indications: hypertension 90 Tab 3    latanoprost (XALATAN) 0.005 % ophthalmic solution place 1 drop into both eyes at bedtime  1    acetaminophen (TYLENOL) 500 mg tablet Take 2 Tabs by mouth three (3) times daily as needed for Pain.  Indications: Pain 100 Tab 3    hydrocortisone (ANUSOL-HC) 25 mg supp Insert 1 Suppository into rectum every twelve (12) hours. 180 Each 3    camphor-menthol (SARNA) 0.5-0.5 % lotion Apply  to affected area as needed for Itching. 222 mL 0    LORazepam (ATIVAN) 0.5 mg tablet I tab po q day if needed for anxiety 90 Tab 0    lactulose (CHRONULAC) 10 gram/15 mL solution Take 15 mL by mouth daily. 473 mL 2    aspirin delayed-release 81 mg tablet Take 1 Tab by mouth daily. 30 Tab 6    simethicone (GAS-X) 80 mg chewable tablet Take 80 mg by mouth every six (6) hours as needed for Flatulence.  fluticasone (FLONASE) 50 mcg/actuation nasal spray 2 Sprays by Both Nostrils route daily. 1 Bottle 3    lansoprazole (PREVACID) 15 mg capsule 1 bid 180 Cap 5     No current facility-administered medications on file prior to visit. Physical Exam   Constitutional: She appears well-developed and well-nourished. No distress. /66 (BP 1 Location: Left arm, BP Patient Position: Sitting)   Pulse 72   Temp 97.2 °F (36.2 °C) (Oral)   Resp 16   Ht 4' 8\" (1.422 m)   Wt 98 lb 9.6 oz (44.7 kg)   SpO2 99%   BMI 22.11 kg/m²      Eyes: EOM are normal. Right eye exhibits no discharge. Left eye exhibits no discharge. No scleral icterus. Neck: Neck supple. Cardiovascular: Normal rate, regular rhythm and normal heart sounds. Exam reveals no gallop and no friction rub. No murmur heard. Pulmonary/Chest: Effort normal and breath sounds normal. No respiratory distress. She has no wheezes. She has no rales. Abdominal: Soft. There is no tenderness. Musculoskeletal: She exhibits tenderness (trapezius B). She exhibits no edema. Lymphadenopathy:     She has no cervical adenopathy. Neurological: She is alert. She exhibits normal muscle tone. Skin: Skin is warm and dry. Psychiatric: She has a normal mood and affect.      Lab Results   Component Value Date/Time    Sodium 141 08/21/2018 02:21 PM    Potassium 4.6 08/21/2018 02:21 PM    Chloride 104 08/21/2018 02:21 PM    CO2 30 08/21/2018 02:21 PM    Anion gap 7 08/21/2018 02:21 PM    Glucose 96 08/21/2018 02:21 PM    BUN 16 08/21/2018 02:21 PM    Creatinine 0.58 (L) 08/21/2018 02:21 PM    BUN/Creatinine ratio 28 (H) 08/21/2018 02:21 PM    GFR est AA >60 08/21/2018 02:21 PM    GFR est non-AA >60 08/21/2018 02:21 PM    Calcium 9.5 08/21/2018 02:21 PM    Bilirubin, total 0.2 08/21/2018 02:21 PM    AST (SGOT) 23 08/21/2018 02:21 PM    Alk. phosphatase 101 08/21/2018 02:21 PM    Protein, total 7.2 08/21/2018 02:21 PM    Albumin 3.9 08/21/2018 02:21 PM    Globulin 3.3 08/21/2018 02:21 PM    A-G Ratio 1.2 08/21/2018 02:21 PM    ALT (SGPT) 25 08/21/2018 02:21 PM     ASSESSMENT and PLAN    ICD-10-CM ICD-9-CM    1. Essential hypertension D72 458.4 METABOLIC PANEL, COMPREHENSIVE      CBC WITH AUTOMATED DIFF      TSH 3RD GENERATION   2. Myalgia M79.10 729.1 SED RATE (ESR)      CK   3. Arthritis M19.90 716.90    4. Anxiety F41.9 300.00      Repeat labs today with ESR, CK given increased myalgias, arthralgias. . Will continue with current medication for now and try adding mobic for pain. Discussed taking tylenol more regularly. BP stable.

## 2019-05-29 NOTE — PATIENT INSTRUCTIONS
Musculoskeletal Pain: Care Instructions  Your Care Instructions    Different problems with the bones, muscles, nerves, ligaments, and tendons in the body can cause pain. One or more areas of your body may ache or burn. Or they may feel tired, stiff, or sore. The medical term for this type of pain is musculoskeletal pain. It can have many different causes. Sometimes the pain is caused by an injury such as a strain or sprain. Or you might have pain from using one part of your body in the same way over and over again. This is called overuse. In some cases, the cause of the pain is another health problem such as arthritis or fibromyalgia. The doctor will examine you and ask you questions about your health to help find the cause of your pain. Blood tests or imaging tests like an X-ray may also be helpful. But sometimes doctors can't find a cause of the pain. Treatment depends on your symptoms and the cause of the pain, if known. The doctor has checked you carefully, but problems can develop later. If you notice any problems or new symptoms, get medical treatment right away. Follow-up care is a key part of your treatment and safety. Be sure to make and go to all appointments, and call your doctor if you are having problems. It's also a good idea to know your test results and keep a list of the medicines you take. How can you care for yourself at home? · Rest until you feel better. · Do not do anything that makes the pain worse. Return to exercise gradually if you feel better and your doctor says it's okay. · Be safe with medicines. Read and follow all instructions on the label. ? If the doctor gave you a prescription medicine for pain, take it as prescribed. ? If you are not taking a prescription pain medicine, ask your doctor if you can take an over-the-counter medicine. · Put ice or a cold pack on the area for 10 to 20 minutes at a time to ease pain.  Put a thin cloth between the ice and your skin.  When should you call for help? Call your doctor now or seek immediate medical care if:    · You have new pain, or your pain gets worse.     · You have new symptoms such as a fever, a rash, or chills.    Watch closely for changes in your health, and be sure to contact your doctor if:    · You do not get better as expected. Where can you learn more? Go to http://tootie-mery.info/. Enter Z665 in the search box to learn more about \"Musculoskeletal Pain: Care Instructions. \"  Current as of: Radha 3, 2018  Content Version: 11.9  © 0698-0411 [employer]. Care instructions adapted under license by [facility] (which disclaims liability or warranty for this information). If you have questions about a medical condition or this instruction, always ask your healthcare professional. [address] any warranty or liability for your use of this information.

## 2019-05-30 ENCOUNTER — TELEPHONE (OUTPATIENT)
Dept: INTERNAL MEDICINE CLINIC | Age: 84
End: 2019-05-30

## 2019-05-30 NOTE — TELEPHONE ENCOUNTER
Return call made, hematology answered and stated that Arabella Powell was in the other line to try the call back

## 2019-05-30 NOTE — TELEPHONE ENCOUNTER
Rodolfo Wilson from [facility] calling to speak to a nurse about recent labs that was submitted [phone]

## 2019-10-29 ENCOUNTER — DOCUMENTATION ONLY (OUTPATIENT)
Dept: FAMILY MEDICINE CLINIC | Age: 84
End: 2019-10-29

## 2019-10-29 NOTE — PROGRESS NOTES
Faxed flu immunization record 10/8/19 from [address] [place] [address] to [address] [place] [address] on 10/10/19 @ [phone]. Appt 11/12/19 with Dr. Archana Camp.   Sent to scanning

## 2019-11-12 ENCOUNTER — OFFICE VISIT (OUTPATIENT)
Dept: FAMILY MEDICINE CLINIC | Age: 84
End: 2019-11-12

## 2019-11-12 VITALS
OXYGEN SATURATION: 99 % | HEART RATE: 73 BPM | DIASTOLIC BLOOD PRESSURE: 80 MMHG | TEMPERATURE: 95.5 F | HEIGHT: 56 IN | SYSTOLIC BLOOD PRESSURE: 171 MMHG | BODY MASS INDEX: 22.5 KG/M2 | RESPIRATION RATE: 16 BRPM | WEIGHT: 100 LBS

## 2019-11-12 DIAGNOSIS — H61.91 SKIN LESION OF RIGHT EAR: Primary | ICD-10-CM

## 2019-11-12 DIAGNOSIS — I10 ESSENTIAL HYPERTENSION: ICD-10-CM

## 2019-11-12 RX ORDER — MELOXICAM 7.5 MG/1
7.5 TABLET ORAL DAILY
Qty: 90 TAB | Refills: 1 | Status: SHIPPED | OUTPATIENT
Start: 2019-11-12 | End: 2020-06-02 | Stop reason: SDUPTHER

## 2019-11-12 RX ORDER — LISINOPRIL 20 MG/1
20 TABLET ORAL DAILY
Qty: 90 TAB | Refills: 3 | Status: SHIPPED | OUTPATIENT
Start: 2019-11-12 | End: 2020-06-09 | Stop reason: SDUPTHER

## 2019-11-12 NOTE — PROGRESS NOTES
1. Have you been to the ER, urgent care clinic since your last visit? Hospitalized since your last visit? No.     2. Have you seen or consulted any other health care providers outside of the [address] [place] [address] since your last visit? Include any pap smears or colon screening.  No.     Chief Complaint   Patient presents with    New Patient     Previous PCP was Dr. Meera Khan    Hypertension    Osteoporosis

## 2019-11-12 NOTE — PATIENT INSTRUCTIONS
Diet for Irritable Bowel Syndrome: Care Instructions  Your Care Instructions    Irritable bowel syndrome, or IBS, is a problem with the intestines. IBS can cause belly pain, bloating, gas, constipation, and diarrhea. Most people can control their symptoms by changing their diet and easing stress. No specific foods cause everyone with IBS to have symptoms. Many people find that they feel better by limiting or eliminating foods that may bring on symptoms. Make sure you don't stop eating all foods from any one food group without talking with a dietitian. You need to make sure you are still getting all the nutrients you need. Follow-up care is a key part of your treatment and safety. Be sure to make and go to all appointments, and call your doctor if you are having problems. It's also a good idea to know your test results and keep a list of the medicines you take. How can you care for yourself at home? To reduce constipation  · Include fruits, vegetables, beans, and whole grains in your diet each day. These foods are high in fiber. Slowly increase the amount of fiber you eat. This helps you avoid a lot of gas. · Drink plenty of fluids. If you have kidney, heart, or liver disease and have to limit fluids, talk with your doctor before you increase the amount of fluids you drink. · Get some exercise every day. Build up slowly to 30 to 60 minutes a day on 5 or more days of the week. · Take a fiber supplement, such as Citrucel or Metamucil, every day if needed. Read and follow all instructions on the label. · Schedule time each day for a bowel movement. Having a daily routine may help. Take your time and do not strain when having a bowel movement. · Check with your doctor before you increase the amount of fiber in your diet. For some people who have IBS, eating more fiber may make some symptoms worse. This includes bloating.   To reduce diarrhea  You may try giving up foods or drinks one at a time to see whether symptoms improve. Limit or avoid the following:  · Alcohol  · Caffeine, which is found in coffee, tea, cola drinks, and chocolate  · Nicotine, from smoking or chewing tobacco  · Gas-producing foods, such as beans, broccoli, cabbage, and apples  · Dairy products that contain lactose (milk sugar), such as ice cream and milk. · Foods and drinks high in sugar, especially fruit juice, soda, candy, and other packaged sweets (such as cookies)  · Foods high in fat, including farias, sausage, butter, oils, and anything deep-fried  · Sorbitol and xylitol, artificial sweeteners found in some sugarless candies and chewing gum  Keep track of foods  · Some people with IBS use a daily food diary to keep track of what they eat and whether they have any symptoms after eating certain foods. The diary also can be a good way to record what is going on in your life. · Stress plays a role in IBS. So if you are aware that certain stresses bring on symptoms, you can try to reduce those stresses. Keep mealtimes pleasant  · Try to maintain a pleasant environment when you eat. This may reduce stress that can make symptoms likely to occur. · Give yourself plenty of time to eat, rather than eating on the go. Chew your food slowly. Try not to swallow air, which can cause bloating. Where can you learn more? Go to http://tootie-emry.info/. Enter X259 in the search box to learn more about \"Diet for Irritable Bowel Syndrome: Care Instructions. \"  Current as of: November 7, 2018  Content Version: 12.2  © 6644-0188 [employer] Care instructions adapted under license by [employer] (which disclaims liability or warranty for this information). If you have questions about a medical condition or this instruction, always ask your healthcare professional. [address] any warranty or liability for your use of this information.

## 2019-11-12 NOTE — PROGRESS NOTES
Alicia Crooks is a 80 y.o. female and presents with New Patient (Previous PCP was Dr. Megha Cavanaugh); Hypertension; Osteoporosis; and Skin Problem (dry patches behind right ear and left lower leg)       Subjective:    Dry skin- on ear and left lower leg. She would like to see dermatology for this  htn-no chest pain or shortness of breath. Taking medications as directed    Assessment/Plan:      htn-increase lisinopril to 20 discussed. Continue to follow. Ear lesion- non healing- referral to dermatology placed. rtc in 4 months    Diagnoses and all orders for this visit:    1. Skin lesion of right ear  -     REFERRAL TO DERMATOLOGY    2. Essential hypertension    Other orders  -     meloxicam (MOBIC) 7.5 mg tablet; Take 1 Tab by mouth daily. Indications: joint damage causing pain and loss of function  -     lisinopril (PRINIVIL, ZESTRIL) 20 mg tablet; Take 1 Tab by mouth daily. Indications: high blood pressure        RTC in 4 months  Orders Placed This Encounter    REFERRAL TO DERMATOLOGY     Referral Priority:   Routine     Referral Type:   Consultation     Referral Reason:   Specialty Services Required     Referred to Provider:   Aileen Harris MD     Number of Visits Requested:   1    meloxicam (MOBIC) 7.5 mg tablet     Sig: Take 1 Tab by mouth daily. Indications: joint damage causing pain and loss of function     Dispense:  90 Tab     Refill:  1    lisinopril (PRINIVIL, ZESTRIL) 20 mg tablet     Sig: Take 1 Tab by mouth daily. Indications: high blood pressure     Dispense:  90 Tab     Refill:  3               ROS:  Negative except as mentioned above  Cardiac- no chest pain or palpitations  Pulmonary- no sob or wheezes  GI- no n/v or diarrhea.     SH:  Social History     Tobacco Use    Smoking status: Never Smoker    Smokeless tobacco: Never Used   Substance Use Topics    Alcohol use: No    Drug use: No         Medications/Allergies:  Current Outpatient Medications on File Prior to Visit Medication Sig Dispense Refill    escitalopram oxalate (LEXAPRO) 10 mg tablet Take 1 Tab by mouth daily. 90 Tab 5    lisinopril (PRINIVIL, ZESTRIL) 10 mg tablet Take 1 Tab by mouth daily. Indications: high blood pressure 90 Tab 3    meloxicam (MOBIC) 7.5 mg tablet Take 1 Tab by mouth daily. Indications: Joint Damage causing Pain and Loss of Function 90 Tab 0    latanoprost (XALATAN) 0.005 % ophthalmic solution place 1 drop into both eyes at bedtime  1    acetaminophen (TYLENOL) 500 mg tablet Take 2 Tabs by mouth three (3) times daily as needed for Pain. Indications: Pain 100 Tab 3    simethicone (GAS-X) 80 mg chewable tablet Take 80 mg by mouth every six (6) hours as needed for Flatulence.  fluticasone (FLONASE) 50 mcg/actuation nasal spray 2 Sprays by Both Nostrils route daily. 1 Bottle 3    hydrocortisone (ANUSOL-HC) 25 mg supp Insert 1 Suppository into rectum every twelve (12) hours. 180 Each 3    camphor-menthol (SARNA) 0.5-0.5 % lotion Apply  to affected area as needed for Itching. 222 mL 0    lactulose (CHRONULAC) 10 gram/15 mL solution Take 15 mL by mouth daily. 473 mL 2    aspirin delayed-release 81 mg tablet Take 1 Tab by mouth daily. 30 Tab 6    lansoprazole (PREVACID) 15 mg capsule 1 bid 180 Cap 5     No current facility-administered medications on file prior to visit. Allergies   Allergen Reactions    Flexeril [Cyclobenzaprine] Unknown (comments)     unknown    Pcn [Penicillins] Rash       Objective:  Visit Vitals  /80   Pulse 73   Temp 95.5 °F (35.3 °C) (Oral)   Resp 16   Ht 4' 8\" (1.422 m)   Wt 100 lb (45.4 kg)   SpO2 99%   BMI 22.42 kg/m²    Body mass index is 22.42 kg/m². Constitutional: Well developed, nourished, no distress, alert   skin Right ear with irregular scaling lesion. CV: S1, S2.  RRR. No murmurs/rubs. No thrills palpated. No carotid bruits. Intact distal pulses. No edema. Pulm: No abnormalities on inspection.   Clear to auscultation bilaterally. No wheezing/rhonchi. Normal effort. GI: Soft, nontender, nondistended. Normal active bowel sounds. No  masses on palpation. No hepatosplenomegaly.

## 2019-11-21 PROBLEM — I10 ESSENTIAL HYPERTENSION: Status: ACTIVE | Noted: 2019-11-21

## 2020-03-17 RX ORDER — LACTULOSE 10 G/15ML
10 SOLUTION ORAL; RECTAL DAILY
Qty: 473 ML | Refills: 2 | Status: SHIPPED | OUTPATIENT
Start: 2020-03-17

## 2020-03-17 NOTE — TELEPHONE ENCOUNTER
Patient is requesting the following prescription for stools :   Constulose.     Call her back at [phone]

## 2020-03-17 NOTE — TELEPHONE ENCOUNTER
I spoke to patient and aware that Dr. Tristan Adan refilled her Lactulose and sent to [facility].

## 2020-06-03 RX ORDER — MELOXICAM 7.5 MG/1
7.5 TABLET ORAL DAILY
Qty: 90 TAB | Refills: 1 | Status: SHIPPED | OUTPATIENT
Start: 2020-06-03 | End: 2020-11-05 | Stop reason: SDUPTHER

## 2020-06-09 ENCOUNTER — VIRTUAL VISIT (OUTPATIENT)
Dept: FAMILY MEDICINE CLINIC | Age: 85
End: 2020-06-09

## 2020-06-09 DIAGNOSIS — M54.50 CHRONIC BILATERAL LOW BACK PAIN WITHOUT SCIATICA: ICD-10-CM

## 2020-06-09 DIAGNOSIS — I10 ESSENTIAL HYPERTENSION: ICD-10-CM

## 2020-06-09 DIAGNOSIS — G89.29 CHRONIC BILATERAL LOW BACK PAIN WITHOUT SCIATICA: ICD-10-CM

## 2020-06-09 DIAGNOSIS — M81.0 AGE-RELATED OSTEOPOROSIS WITHOUT CURRENT PATHOLOGICAL FRACTURE: Primary | ICD-10-CM

## 2020-06-09 PROBLEM — J30.1 SEASONAL ALLERGIC RHINITIS DUE TO POLLEN: Status: ACTIVE | Noted: 2020-06-09

## 2020-06-09 RX ORDER — ESCITALOPRAM OXALATE 10 MG/1
10 TABLET ORAL DAILY
Qty: 90 TAB | Refills: 5 | Status: SHIPPED | OUTPATIENT
Start: 2020-06-09

## 2020-06-09 RX ORDER — FLUTICASONE PROPIONATE 50 MCG
SPRAY, SUSPENSION (ML) NASAL
Qty: 3 BOTTLE | Refills: 1 | Status: SHIPPED | OUTPATIENT
Start: 2020-06-09

## 2020-06-09 RX ORDER — LISINOPRIL 40 MG/1
40 TABLET ORAL DAILY
Qty: 90 TAB | Refills: 3 | Status: SHIPPED | OUTPATIENT
Start: 2020-06-09

## 2020-06-09 NOTE — PROGRESS NOTES
Chuck Dominique is a 80 y.o. female who was seen by synchronous (real-time) audio-video technology on 6/9/2020. Consent: Chuck Dominique, who was seen by synchronous (real-time) audio-video technology, and/or her healthcare decision maker, is aware that this patient-initiated, Telehealth encounter on 6/9/2020 is a billable service, with coverage as determined by her insurance carrier. She is aware that she may receive a bill and has provided verbal consent to proceed: Scooby Frausto Assessment & Plan:   Diagnoses and all orders for this visit:    1. Age-related osteoporosis without current pathological fracture    2. Essential hypertension    3. Chronic bilateral low back pain without sciatica    Other orders  -     escitalopram oxalate (LEXAPRO) 10 mg tablet; Take 1 Tab by mouth daily. -     fluticasone propionate (FLONASE) 50 mcg/actuation nasal spray; Two sprays each nostril daily. Use for two-four weeks at a time to control symptoms. -     lisinopriL (PRINIVIL, ZESTRIL) 40 mg tablet; Take 1 Tab by mouth daily. Indications: high blood pressure      Allergic rhinitis- discussed afrin for up 5 days. Also will rx flonase to use for 2-4 weeks. htn-not controlled. Will increase to 40 mg discussed. 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 And on review she also has a history of osteoporosis in the past but is not on treatment for this. She states she would be amenable to treatment possibly. Discussed getting an x-ray at this point to make sure there are no compression fractures but she would like to hold off on this as the pain has been chronic and not significantly worse as she is afraid of coronavirus. Will follow for now and do x-rays when able additionally will consider discussion of osteoporosis treatment as well  F/u in 3 weeks for MWV  712  Subjective:   Chuck Dominique is a 80 y.o. female who was seen for   Allergies- worsened. Sinus drainage and nasal congestion. started fexofenadine and some improvement. Started in spring off and on but now worse for last week. Ear lesion- went to dermatology and had cryotherapy. Resolved. No recurrence. htn- bp at home 157/72 today. 164/144/130 other readings. Prior to Admission medications    Medication Sig Start Date End Date Taking? Authorizing Provider   escitalopram oxalate (LEXAPRO) 10 mg tablet Take 1 Tab by mouth daily. 6/9/20  Yes Marvin Paulino MD   meloxicam (MOBIC) 7.5 mg tablet Take 1 Tab by mouth daily. Indications: joint damage causing pain and loss of function 6/3/20  Yes Marvin Paulino MD   lactulose (CHRONULAC) 10 gram/15 mL solution Take 15 mL by mouth daily. 3/17/20  Yes Marvin Paulino MD   lisinopril (PRINIVIL, ZESTRIL) 20 mg tablet Take 1 Tab by mouth daily. Indications: high blood pressure 11/12/19  Yes Marvin Paulino MD   hydrocortisone (ANUSOL-HC) 25 mg supp Insert 1 Suppository into rectum every twelve (12) hours. 2/26/19  Yes Anil Rivero MD   latanoprost (XALATAN) 0.005 % ophthalmic solution place 1 drop into both eyes at bedtime 4/23/18  Yes Provider, Historical   acetaminophen (TYLENOL) 500 mg tablet Take 2 Tabs by mouth three (3) times daily as needed for Pain. Indications: Pain 5/14/18  Yes Anil Rivero MD   simethicone (GAS-X) 80 mg chewable tablet Take 80 mg by mouth every six (6) hours as needed for Flatulence. Yes Provider, Historical   fluticasone (FLONASE) 50 mcg/actuation nasal spray 2 Sprays by Both Nostrils route daily. 8/10/17  Yes Anil Rivero MD   escitalopram oxalate (LEXAPRO) 10 mg tablet Take 1 Tab by mouth daily. 5/29/19 6/9/20  Anil Rivero MD   camphor-menthol [facility]ON [facility]ADILL[facility]) 0.5-0.5 % lotion Apply  to affected area as needed for Itching. 2/25/19   Anil Rivero MD   aspirin delayed-release 81 mg tablet Take 1 Tab by mouth daily.  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    Glaucoma H40.9    Anxiety F41.9    Rectal polyp K62.1    Rectal pain K62.89    Persistent cough R05    Lung crackles R09.89    Chills R68.83    Abnormal chest x-ray R93.89    Mild depression (HCC) F32.0    Essential hypertension I10     Patient Active Problem List    Diagnosis Date Noted    Essential hypertension 11/21/2019    Mild depression ([address] 08/21/2018    Abnormal chest x-ray 08/19/2015    Persistent cough 08/17/2015    Lung crackles 08/17/2015    Chills 08/17/2015    Rectal polyp 01/09/2015    Rectal pain 01/09/2015    Irritable bowel syndrome 09/26/2011    Degenerative arthritis of lumbar spine 09/26/2011    Glaucoma 09/26/2011    Anxiety 09/26/2011     Current Outpatient Medications   Medication Sig Dispense Refill    escitalopram oxalate (LEXAPRO) 10 mg tablet Take 1 Tab by mouth daily. 90 Tab 5    meloxicam (MOBIC) 7.5 mg tablet Take 1 Tab by mouth daily. Indications: joint damage causing pain and loss of function 90 Tab 1    lactulose (CHRONULAC) 10 gram/15 mL solution Take 15 mL by mouth daily. 473 mL 2    lisinopril (PRINIVIL, ZESTRIL) 20 mg tablet Take 1 Tab by mouth daily. Indications: high blood pressure 90 Tab 3    hydrocortisone (ANUSOL-HC) 25 mg supp Insert 1 Suppository into rectum every twelve (12) hours. 180 Each 3    latanoprost (XALATAN) 0.005 % ophthalmic solution place 1 drop into both eyes at bedtime  1    acetaminophen (TYLENOL) 500 mg tablet Take 2 Tabs by mouth three (3) times daily as needed for Pain. Indications: Pain 100 Tab 3    simethicone (GAS-X) 80 mg chewable tablet Take 80 mg by mouth every six (6) hours as needed for Flatulence.       fluticasone (FLONASE) 50 mcg/actuation nasal spray 2 Sprays by Both Nostrils route daily. 1 Bottle 3    camphor-menthol (SARNA) 0.5-0.5 % lotion Apply  to affected area as needed for Itching. 222 mL 0    aspirin delayed-release 81 mg tablet Take 1 Tab by mouth daily. 30 Tab 6    lansoprazole (PREVACID) 15 mg capsule 1 bid 180 Cap 5     Allergies   Allergen Reactions    Flexeril [Cyclobenzaprine] Unknown (comments)     unknown    Pcn [Penicillins] Rash     Past Medical History:   Diagnosis Date    Cancer ([facility] [address] 8/2001    skin ca; chest     Degenerative arthritis of lumbar spine 9/26/2011    Depression 9/26/2011    Glaucoma     Hypertension     Menopause     Osteoporosis     Scoliosis     Vitamin D deficiency      Past Surgical History:   Procedure Laterality Date    BREAST SURGERY PROCEDURE UNLISTED      HX BREAST BIOPSY Right     Benign    HX BREAST LUMPECTOMY Left     Per pt \"cyst\" removal    HX GYN  8/2001    adhesion of the uterus        ROS  Cardiac- no chest pain or palpitations  Pulmonary- no sob or wheezes  GI- no n/v or diarrhea. Objective: There were no vitals taken for this visit. General: alert, cooperative, no distress   Mental  status: normal mood, behavior, speech, dress, motor activity, and thought processes, able to follow commands   HENT: NCAT   Neck: no visualized mass   Resp: no respiratory distress   Neuro: no gross deficits   Skin: no discoloration or lesions of concern on visible areas   Psychiatric: normal affect, consistent with stated mood, no evidence of hallucinations     Additional exam findings: We discussed the expected course, resolution and complications of the diagnosis(es) in detail. Medication risks, benefits, costs, interactions, and alternatives were discussed as indicated. I advised her to contact the office if her condition worsens, changes or fails to improve as anticipated. She expressed understanding with the diagnosis(es) and plan.      Anthony Richardson is a 80 y.o. female who was evaluated by a video visit encounter for concerns as above. Patient identification was verified prior to start of the visit. A caregiver was present when appropriate. Due to this being a TeleHealth encounter (During PURZX-07 public health emergency), evaluation of the following organ systems was limited: Vitals/Constitutional/EENT/Resp/CV/GI//MS/Neuro/Skin/Heme-Lymph-Imm. Pursuant to the emergency declaration under the [address] [place] [address], [facility] waiver authority and the [employer] Act, this Virtual  Visit was conducted, with patient's (and/or legal guardian's) consent, to reduce the patient's risk of exposure to COVID-19 and provide necessary medical care. Services were provided through a video synchronous discussion virtually to substitute for in-person clinic visit. Patient and provider were located at their individual homes.       Jackie Hernandez MD

## 2020-06-09 NOTE — PROGRESS NOTES
1. Have you been to the ER, urgent care clinic since your last visit? Hospitalized since your last visit? No    2. Have you seen or consulted any other health care providers outside of the [address] [place] [address] since your last visit? Include any pap smears or colon screening.  No    Chief Complaint   Patient presents with   Juan Brownlee Annual Wellness Visit

## 2020-07-22 RX ORDER — HYDROCORTISONE ACETATE 25 MG/1
25 SUPPOSITORY RECTAL EVERY 12 HOURS
Qty: 180 EACH | Refills: 3 | Status: SHIPPED | OUTPATIENT
Start: 2020-07-22

## 2020-11-09 RX ORDER — MELOXICAM 7.5 MG/1
7.5 TABLET ORAL DAILY
Qty: 90 TAB | Refills: 1 | Status: SHIPPED | OUTPATIENT
Start: 2020-11-09

## 2020-12-03 ENCOUNTER — VIRTUAL VISIT (OUTPATIENT)
Dept: FAMILY MEDICINE CLINIC | Age: 85
End: 2020-12-03
Payer: MEDICARE

## 2020-12-03 DIAGNOSIS — Z00.00 MEDICARE ANNUAL WELLNESS VISIT, SUBSEQUENT: ICD-10-CM

## 2020-12-03 DIAGNOSIS — Z13.39 SCREENING FOR ALCOHOLISM: ICD-10-CM

## 2020-12-03 DIAGNOSIS — Z13.31 SCREENING FOR DEPRESSION: ICD-10-CM

## 2020-12-03 PROCEDURE — G8421 BMI NOT CALCULATED: HCPCS | Performed by: INTERNAL MEDICINE

## 2020-12-03 PROCEDURE — G0439 PPPS, SUBSEQ VISIT: HCPCS | Performed by: INTERNAL MEDICINE

## 2020-12-03 PROCEDURE — G0444 DEPRESSION SCREEN ANNUAL: HCPCS | Performed by: INTERNAL MEDICINE

## 2020-12-03 PROCEDURE — G9717 DOC PT DX DEP/BP F/U NT REQ: HCPCS | Performed by: INTERNAL MEDICINE

## 2020-12-03 PROCEDURE — G8536 NO DOC ELDER MAL SCRN: HCPCS | Performed by: INTERNAL MEDICINE

## 2020-12-03 PROCEDURE — G8427 DOCREV CUR MEDS BY ELIG CLIN: HCPCS | Performed by: INTERNAL MEDICINE

## 2020-12-03 PROCEDURE — 1101F PT FALLS ASSESS-DOCD LE1/YR: CPT | Performed by: INTERNAL MEDICINE

## 2020-12-03 RX ORDER — UREA 10 %
100 LOTION (ML) TOPICAL DAILY
COMMUNITY

## 2020-12-03 RX ORDER — ASCORBIC ACID 250 MG
TABLET ORAL
COMMUNITY

## 2020-12-03 RX ORDER — MELATONIN
DAILY
COMMUNITY

## 2020-12-03 NOTE — PROGRESS NOTES
This is the Subsequent Medicare Annual Wellness Exam, performed 12 months or more after the Initial AWV or the last Subsequent AWV    I have reviewed the patient's medical history in detail and updated the computerized patient record. Depression Risk Factor Screening:     3 most recent PHQ Screens 12/3/2020   Little interest or pleasure in doing things Not at all   Feeling down, depressed, irritable, or hopeless Not at all   Total Score PHQ 2 0       Alcohol Risk Screen   Do you average more than 1 drink per night or more than 7 drinks a week:  No    On any one occasion in the past three months have you have had more than 3 drinks containing alcohol:  No        Functional Ability and Level of Safety:   Hearing: Hearing is good. Activities of Daily Living: The home contains: handrails and grab bars  Patient does total self care     Ambulation: with no difficulty     Fall Risk:  Fall Risk Assessment, last 12 mths 12/3/2020   Able to walk? Yes   Fall in past 12 months? No   Fall with injury? -   Number of falls in past 12 months -   Fall Risk Score -     Abuse Screen:  Patient is not abused       Cognitive Screening   Has your family/caregiver stated any concerns about your memory: no    Cognitive Screening: Normal - clock draw    Assessment/Plan   Education and counseling provided:  Are appropriate based on today's review and evaluation    Diagnoses and all orders for this visit:    1. Medicare annual wellness visit, subsequent    2. Screening for alcoholism    3.  Screening for depression  -     [place] Maintenance Due     Health Maintenance Due   Topic Date Due    Medicare Yearly Exam  08/22/2019    Flu Vaccine (1) 09/01/2020       Patient Care Team   Patient Care Team:  Danii Hood MD as PCP - General (Internal Medicine)  Danii Hood 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    Glaucoma H40.9    Anxiety F41.9    Rectal polyp K62.1    Rectal pain K62.89    Persistent cough R05    Lung crackles R09.89    Chills R68.83    Abnormal chest x-ray R93.89    Mild depression (HCC) F32.0    Essential hypertension I10    Chronic bilateral low back pain without sciatica M54.5, G89.29    Age-related osteoporosis without current pathological fracture M81.0    Seasonal allergic rhinitis due to pollen J30.1     Past Medical History:   Diagnosis Date    Cancer ([facility] [address] 8/2001    skin ca; chest     Degenerative arthritis of lumbar spine 9/26/2011    Depression 9/26/2011    Glaucoma     Hypertension     Menopause     Osteoporosis     Scoliosis     Vitamin D deficiency       Past Surgical History:   Procedure Laterality Date    BREAST SURGERY PROCEDURE UNLISTED      HX BREAST BIOPSY Right     Benign    HX BREAST LUMPECTOMY Left     Per pt \"cyst\" removal    HX GYN  8/2001    adhesion of the uterus      Current Outpatient Medications   Medication Sig Dispense Refill    cholecalciferol (Vitamin D3) (1000 Units /25 mcg) tablet Take  by mouth daily.  ascorbic acid, vitamin C, (Vitamin C) 250 mg tablet Take  by mouth.  cyanocobalamin (Vitamin B-12) 100 mcg tablet Take 100 mcg by mouth daily.  meloxicam (MOBIC) 7.5 mg tablet Take 1 Tab by mouth daily. Indications: joint damage causing pain and loss of function 90 Tab 1    hydrocortisone (ANUSOL-HC) 25 mg supp Insert 1 Suppository into rectum every twelve (12) hours. 180 Each 3    escitalopram oxalate (LEXAPRO) 10 mg tablet Take 1 Tab by mouth daily. 90 Tab 5    fluticasone propionate (FLONASE) 50 mcg/actuation nasal spray Two sprays each nostril daily. Use for two-four weeks at a time to control symptoms. 3 Bottle 1    lisinopriL (PRINIVIL, ZESTRIL) 40 mg tablet Take 1 Tab by mouth daily.  Indications: high blood pressure 90 Tab 3    lactulose (CHRONULAC) 10 gram/15 mL solution Take 15 mL by mouth daily. 473 mL 2    latanoprost (XALATAN) 0.005 % ophthalmic solution place 1 drop into both eyes at bedtime  1    acetaminophen (TYLENOL) 500 mg tablet Take 2 Tabs by mouth three (3) times daily as needed for Pain. Indications: Pain 100 Tab 3    aspirin delayed-release 81 mg tablet Take 1 Tab by mouth daily. 30 Tab 6    simethicone (GAS-X) 80 mg chewable tablet Take 80 mg by mouth every six (6) hours as needed for Flatulence.  fluticasone (FLONASE) 50 mcg/actuation nasal spray 2 Sprays by Both Nostrils route daily. 1 Bottle 3    camphor-menthol (SARNA) 0.5-0.5 % lotion Apply  to affected area as needed for Itching. 222 mL 0    lansoprazole (PREVACID) 15 mg capsule 1 bid 180 Cap 5     Allergies   Allergen Reactions    Flexeril [Cyclobenzaprine] Unknown (comments)     unknown    Pcn [Penicillins] Rash       Family History   Problem Relation Age of Onset    Cancer Mother     Lung Cancer Mother         Smoker    Heart Disease Father     Hypertension Sister     Diabetes Brother     Heart Disease Brother     Cancer Brother     Heart Disease Sister      Social History     Tobacco Use    Smoking status: Never Smoker    Smokeless tobacco: Never Used   Substance Use Topics    Alcohol use: No       Domingo Rubin, who was evaluated through a synchronous (real-time) audio-video encounter, and/or her healthcare decision maker, is aware that it is a billable service, with coverage as determined by her insurance carrier. She provided verbal consent to proceed: Yes, and patient identification was verified. It was conducted pursuant to the emergency declaration under the [facility], [address] [place] [address] authority and the [employer] Act. A caregiver was present when appropriate. Ability to conduct physical exam was limited.  I was at home. The patient was at home.     Yessica Nugent MD

## 2020-12-03 NOTE — PROGRESS NOTES
1. Have you been to the ER, urgent care clinic since your last visit? Hospitalized since your last visit? No    2. Have you seen or consulted any other health care providers outside of the [address] [place] [address] since your last visit? Include any pap smears or colon screening.  No    Chief Complaint   Patient presents with   [facility] Annual Wellness Visit

## 2020-12-03 NOTE — PATIENT INSTRUCTIONS

## 2022-03-18 PROBLEM — G89.29 CHRONIC BILATERAL LOW BACK PAIN WITHOUT SCIATICA: Status: ACTIVE | Noted: 2020-06-09

## 2022-03-18 PROBLEM — I10 ESSENTIAL HYPERTENSION: Status: ACTIVE | Noted: 2019-11-21

## 2022-03-18 PROBLEM — M54.50 CHRONIC BILATERAL LOW BACK PAIN WITHOUT SCIATICA: Status: ACTIVE | Noted: 2020-06-09

## 2022-03-19 PROBLEM — J30.1 SEASONAL ALLERGIC RHINITIS DUE TO POLLEN: Status: ACTIVE | Noted: 2020-06-09

## 2022-03-19 PROBLEM — M81.0 AGE-RELATED OSTEOPOROSIS WITHOUT CURRENT PATHOLOGICAL FRACTURE: Status: ACTIVE | Noted: 2020-06-09

## 2022-03-20 PROBLEM — F32.A MILD DEPRESSION: Status: ACTIVE | Noted: 2018-08-21

## 2023-02-02 RX ORDER — MELOXICAM 7.5 MG/1
7.5 TABLET ORAL DAILY
COMMUNITY
Start: 2020-11-09

## 2023-02-02 RX ORDER — LANSOPRAZOLE 15 MG/1
CAPSULE, DELAYED RELEASE ORAL
COMMUNITY
Start: 2017-05-30

## 2023-02-02 RX ORDER — SIMETHICONE 80 MG
80 TABLET,CHEWABLE ORAL EVERY 6 HOURS PRN
COMMUNITY

## 2023-02-02 RX ORDER — HYDROCORTISONE ACETATE 25 MG/1
25 SUPPOSITORY RECTAL EVERY 12 HOURS
COMMUNITY
Start: 2020-07-22

## 2023-02-02 RX ORDER — LATANOPROST 50 UG/ML
SOLUTION/ DROPS OPHTHALMIC
COMMUNITY
Start: 2018-04-23

## 2023-02-02 RX ORDER — LISINOPRIL 40 MG/1
40 TABLET ORAL DAILY
COMMUNITY
Start: 2020-06-09

## 2023-02-02 RX ORDER — LACTULOSE 10 G/15ML
15 SOLUTION ORAL DAILY
COMMUNITY
Start: 2020-03-17